# Patient Record
Sex: FEMALE | Race: BLACK OR AFRICAN AMERICAN | NOT HISPANIC OR LATINO | Employment: FULL TIME | URBAN - METROPOLITAN AREA
[De-identification: names, ages, dates, MRNs, and addresses within clinical notes are randomized per-mention and may not be internally consistent; named-entity substitution may affect disease eponyms.]

---

## 2017-04-24 ENCOUNTER — LAB VISIT (OUTPATIENT)
Dept: LAB | Facility: HOSPITAL | Age: 47
End: 2017-04-24
Attending: FAMILY MEDICINE
Payer: COMMERCIAL

## 2017-04-24 ENCOUNTER — OFFICE VISIT (OUTPATIENT)
Dept: INTERNAL MEDICINE | Facility: CLINIC | Age: 47
End: 2017-04-24
Payer: COMMERCIAL

## 2017-04-24 VITALS
TEMPERATURE: 98 F | BODY MASS INDEX: 27.59 KG/M2 | WEIGHT: 161.63 LBS | SYSTOLIC BLOOD PRESSURE: 140 MMHG | DIASTOLIC BLOOD PRESSURE: 94 MMHG | HEIGHT: 64 IN | OXYGEN SATURATION: 99 % | HEART RATE: 70 BPM

## 2017-04-24 DIAGNOSIS — E66.3 OVERWEIGHT (BMI 25.0-29.9): ICD-10-CM

## 2017-04-24 DIAGNOSIS — D50.0 IRON DEFICIENCY ANEMIA DUE TO CHRONIC BLOOD LOSS: ICD-10-CM

## 2017-04-24 DIAGNOSIS — I10 ESSENTIAL HYPERTENSION: ICD-10-CM

## 2017-04-24 DIAGNOSIS — Z00.00 ROUTINE GENERAL MEDICAL EXAMINATION AT A HEALTH CARE FACILITY: Primary | ICD-10-CM

## 2017-04-24 DIAGNOSIS — M47.812 SPONDYLOSIS OF CERVICAL REGION WITHOUT MYELOPATHY OR RADICULOPATHY: ICD-10-CM

## 2017-04-24 DIAGNOSIS — Z00.00 ROUTINE GENERAL MEDICAL EXAMINATION AT A HEALTH CARE FACILITY: ICD-10-CM

## 2017-04-24 LAB
ALBUMIN SERPL BCP-MCNC: 3.2 G/DL
ALP SERPL-CCNC: 74 U/L
ALT SERPL W/O P-5'-P-CCNC: 8 U/L
ANION GAP SERPL CALC-SCNC: 9 MMOL/L
AST SERPL-CCNC: 19 U/L
BASOPHILS # BLD AUTO: 0.05 K/UL
BASOPHILS NFR BLD: 1.1 %
BILIRUB SERPL-MCNC: 0.2 MG/DL
BUN SERPL-MCNC: 13 MG/DL
CALCIUM SERPL-MCNC: 8.8 MG/DL
CHLORIDE SERPL-SCNC: 107 MMOL/L
CHOLEST/HDLC SERPL: 3.4 {RATIO}
CO2 SERPL-SCNC: 23 MMOL/L
CREAT SERPL-MCNC: 0.8 MG/DL
DIFFERENTIAL METHOD: ABNORMAL
EOSINOPHIL # BLD AUTO: 0 K/UL
EOSINOPHIL NFR BLD: 0.4 %
ERYTHROCYTE [DISTWIDTH] IN BLOOD BY AUTOMATED COUNT: 16.9 %
EST. GFR  (AFRICAN AMERICAN): >60 ML/MIN/1.73 M^2
EST. GFR  (NON AFRICAN AMERICAN): >60 ML/MIN/1.73 M^2
FERRITIN SERPL-MCNC: 11 NG/ML
FOLATE SERPL-MCNC: 6.2 NG/ML
GLUCOSE SERPL-MCNC: 79 MG/DL
HCT VFR BLD AUTO: 31.4 %
HDL/CHOLESTEROL RATIO: 29.4 %
HDLC SERPL-MCNC: 214 MG/DL
HDLC SERPL-MCNC: 63 MG/DL
HGB BLD-MCNC: 10.1 G/DL
IRON SERPL-MCNC: 257 UG/DL
LDLC SERPL CALC-MCNC: 139.8 MG/DL
LYMPHOCYTES # BLD AUTO: 1.3 K/UL
LYMPHOCYTES NFR BLD: 28.3 %
MCH RBC QN AUTO: 24 PG
MCHC RBC AUTO-ENTMCNC: 32.2 %
MCV RBC AUTO: 75 FL
MONOCYTES # BLD AUTO: 0.4 K/UL
MONOCYTES NFR BLD: 8.5 %
NEUTROPHILS # BLD AUTO: 2.8 K/UL
NEUTROPHILS NFR BLD: 61.5 %
NONHDLC SERPL-MCNC: 151 MG/DL
PLATELET # BLD AUTO: 425 K/UL
PMV BLD AUTO: 8.9 FL
POTASSIUM SERPL-SCNC: 3.9 MMOL/L
PROT SERPL-MCNC: 7.2 G/DL
RBC # BLD AUTO: 4.21 M/UL
SATURATED IRON: 55 %
SODIUM SERPL-SCNC: 139 MMOL/L
TOTAL IRON BINDING CAPACITY: 471 UG/DL
TRANSFERRIN SERPL-MCNC: 318 MG/DL
TRIGL SERPL-MCNC: 56 MG/DL
TSH SERPL DL<=0.005 MIU/L-ACNC: 0.61 UIU/ML
VIT B12 SERPL-MCNC: 381 PG/ML
WBC # BLD AUTO: 4.6 K/UL

## 2017-04-24 PROCEDURE — 84443 ASSAY THYROID STIM HORMONE: CPT

## 2017-04-24 PROCEDURE — 85025 COMPLETE CBC W/AUTO DIFF WBC: CPT

## 2017-04-24 PROCEDURE — 82607 VITAMIN B-12: CPT

## 2017-04-24 PROCEDURE — 80053 COMPREHEN METABOLIC PANEL: CPT

## 2017-04-24 PROCEDURE — 3077F SYST BP >= 140 MM HG: CPT | Mod: S$GLB,,, | Performed by: FAMILY MEDICINE

## 2017-04-24 PROCEDURE — 82728 ASSAY OF FERRITIN: CPT

## 2017-04-24 PROCEDURE — 36415 COLL VENOUS BLD VENIPUNCTURE: CPT | Mod: PO

## 2017-04-24 PROCEDURE — 99396 PREV VISIT EST AGE 40-64: CPT | Mod: S$GLB,,, | Performed by: FAMILY MEDICINE

## 2017-04-24 PROCEDURE — 80061 LIPID PANEL: CPT

## 2017-04-24 PROCEDURE — 83540 ASSAY OF IRON: CPT

## 2017-04-24 PROCEDURE — 99999 PR PBB SHADOW E&M-EST. PATIENT-LVL III: CPT | Mod: PBBFAC,,, | Performed by: FAMILY MEDICINE

## 2017-04-24 PROCEDURE — 82746 ASSAY OF FOLIC ACID SERUM: CPT

## 2017-04-24 PROCEDURE — 3080F DIAST BP >= 90 MM HG: CPT | Mod: S$GLB,,, | Performed by: FAMILY MEDICINE

## 2017-04-24 RX ORDER — LOSARTAN POTASSIUM AND HYDROCHLOROTHIAZIDE 12.5; 5 MG/1; MG/1
1 TABLET ORAL DAILY
Qty: 90 TABLET | Refills: 3 | Status: SHIPPED | OUTPATIENT
Start: 2017-04-24 | End: 2017-12-14 | Stop reason: DRUGHIGH

## 2017-04-24 NOTE — PROGRESS NOTES
"Subjective:       Patient ID: Dana Winter is a 47 y.o. female.    Chief Complaint: Annual Exam    HPI Comments: 47-year-old Afro-American female patient with Patient Active Problem List:     Hypertension     DJD (degenerative joint disease) of cervical spine     Overweight (BMI 25.0-29.9)  Here for routine annual physicals.  Patient reported that she's not been taking her blood pressure medications but requesting refill today.   Patient denies of any significant neck pain at this time.  Reports that she continues to have heavy menstrual cycles, LMP 3/28/16.   Patient has been followed by her gynecologist regularly  Reports minimal fatigue, denies of any chest pain or shortness of breath, abdominal discomfort nausea vomiting.     Review of Systems   Constitutional: Positive for fatigue.   Eyes: Negative for visual disturbance.   Respiratory: Negative for shortness of breath.    Cardiovascular: Negative for chest pain and leg swelling.   Gastrointestinal: Negative for abdominal pain, nausea and vomiting.   Genitourinary: Positive for menstrual problem.   Musculoskeletal: Positive for myalgias. Negative for neck pain.   Skin: Negative for rash.   Neurological: Negative for weakness, light-headedness, numbness and headaches.   Psychiatric/Behavioral: Negative for sleep disturbance.         BP (!) 140/94  Pulse 70  Temp 97.8 °F (36.6 °C) (Tympanic)   Ht 5' 4" (1.626 m)  Wt 73.3 kg (161 lb 9.6 oz)  SpO2 99%  BMI 27.74 kg/m2  Objective:      Physical Exam   Constitutional: She is oriented to person, place, and time. She appears well-developed and well-nourished.   HENT:   Head: Normocephalic and atraumatic.   Mouth/Throat: Oropharynx is clear and moist.   Cardiovascular: Normal rate, regular rhythm and normal heart sounds.    No murmur heard.  Pulmonary/Chest: Effort normal and breath sounds normal. She has no wheezes.   Abdominal: Soft. Bowel sounds are normal. There is no tenderness.   Musculoskeletal: She " exhibits no edema or tenderness.   Neurological: She is alert and oriented to person, place, and time.   Skin: Skin is warm and dry. No rash noted.   Psychiatric: She has a normal mood and affect.         Assessment:       1. Routine general medical examination at a health care facility    2. Essential hypertension    3. Spondylosis of cervical region without myelopathy or radiculopathy    4. Overweight (BMI 25.0-29.9)    5. Iron deficiency anemia due to chronic blood loss        Plan:   Routine general medical examination at a health care facility  -     CBC auto differential; Future; Expected date: 4/24/17  -     Comprehensive metabolic panel; Future; Expected date: 4/24/17  -     Lipid panel; Future; Expected date: 4/24/17  -     TSH; Future; Expected date: 4/24/17  -     Urinalysis; Future; Expected date: 4/24/17  Vital signs stable today.  Clinical exam normal.   Encouraged to maintain lifestyle modifications with low-fat and low-cholesterol diet and exercise 30 minutes daily.  Up-to-date with screenings      Essential hypertension  -     Comprehensive metabolic panel; Future; Expected date: 4/24/17  -     Lipid panel; Future; Expected date: 4/24/17  -     TSH; Future; Expected date: 4/24/17  -     losartan-hydrochlorothiazide 50-12.5 mg (HYZAAR) 50-12.5 mg per tablet; Take 1 tablet by mouth once daily.  Dispense: 90 tablet; Refill: 3  Blood pressure mildly elevated for not taking her medications, will recheck blood pressure in 2 weeks as a nurse visit.   Refill given on blood pressure medications today  Encouraged to monitor blood pressure trends, restrict salt intake and eat low-fat and low-cholesterol diet.    Spondylosis of cervical region without myelopathy or radiculopathy  Stable     Overweight (BMI 25.0-29.9)-lifestyle modifications recommended as noted above    Iron deficiency anemia due to chronic blood loss  -     CBC auto differential; Future; Expected date: 4/24/17  -     Ferritin; Future; Expected  date: 4/24/17  -     Iron and TIBC; Future; Expected date: 4/24/17  -     Folate; Future; Expected date: 4/24/17  -     Vitamin B12; Future; Expected date: 4/24/17  Encouraged discuss further with her gynecology, as menorrhagia causing iron deficiency anemia.  Will recheck labs today and if appropriate will consider starting back on iron supplements  Encouraged to eat iron rich diet

## 2017-04-24 NOTE — MR AVS SNAPSHOT
[facility]  [address] [place] [address]  Phone: [phone]  Fax: [phone]                  Dana Winter   2017 10:00 AM   Office Visit    Description:  Female : 1970   Provider:  Susie Glass MD   Department:  [facility]           Reason for Visit     Annual Exam           Diagnoses this Visit        Comments    Routine general medical examination at a health care facility    -  Primary     Essential hypertension         Spondylosis of cervical region without myelopathy or radiculopathy         Overweight (BMI 25.0-29.9)         Iron deficiency anemia due to chronic blood loss                To Do List           Future Appointments        Provider Department Dept Phone    2017 12:45 PM LAB, SAME DAY [facility] [phone]    2017 1:00 PM SPECIMEN, [facility] [phone]    2017 10:00 AM INTERNAL MEDICINE NURSE, [facility] [phone]    10/24/2017 11:00 AM Susie Glass MD [facility] [phone]      Goals (5 Years of Data)     None      Follow-Up and Disposition     Return in about 6 months (around 10/24/2017), or if symptoms worsen or fail to improve.       These Medications        Disp Refills Start End    losartan-hydrochlorothiazide 50-12.5 mg (HYZAAR) 50-12.5 mg per tablet 90 tablet 3 2017    Take 1 tablet by mouth once daily. - Oral    Pharmacy: [id number] - [place]LA WHITMAN  [address] Ph #: [phone]         [facility]     [facility] Care [facility] -  Assistance  Unless otherwise directed by your provider, please contact [facility], our nurse care line that is available for  assistance.     Registered nurses in the [facility] provide: appointment scheduling, clinical advisement, health education, and other advisory services.  Call: [phone]  "(toll free)               Medications           Message regarding Medications     Verify the changes and/or additions to your medication regime listed below are the same as discussed with your clinician today.  If any of these changes or additions are incorrect, please notify your healthcare provider.             Verify that the below list of medications is an accurate representation of the medications you are currently taking.  If none reported, the list may be blank. If incorrect, please contact your healthcare provider. Carry this list with you in case of emergency.           Current Medications     ferrous sulfate 325 mg (65 mg iron) Tab tablet Take 1 tablet (325 mg total) by mouth 2 (two) times daily.    losartan-hydrochlorothiazide 50-12.5 mg (HYZAAR) 50-12.5 mg per tablet Take 1 tablet by mouth once daily.           Clinical Reference Information           Your Vitals Were     BP Pulse Temp Height Weight SpO2    140/94 70 97.8 °F (36.6 °C) (Tympanic) 5' 4" (1.626 m) 73.3 kg (161 lb 9.6 oz) 99%    BMI                27.74 kg/m2          Blood Pressure          Most Recent Value    BP  (!)  140/94      Allergies as of 4/24/2017     No Known Drug Allergies      Immunizations Administered on Date of Encounter - 4/24/2017     None      Orders Placed During Today's Visit     Future Labs/Procedures Expected by Expires    CBC auto differential  4/24/2017 6/23/2018    Comprehensive metabolic panel  4/24/2017 6/23/2018    Ferritin  4/24/2017 6/23/2018    Folate  4/24/2017 6/23/2018    Iron and TIBC  4/24/2017 6/23/2018    Lipid panel  4/24/2017 6/23/2018    TSH  4/24/2017 6/23/2018    Urinalysis  4/24/2017 6/23/2018    Vitamin B12  4/24/2017 6/23/2018      Language Assistance Services     ATTENTION: Language assistance services are available, free of charge. Please call [phone].      ATENCIÓN: Si momola agatha, tiene a mosqueda disposición servicios gratuitos de asistencia lingüística. Llame al [phone].     CHÚ " Ý: N?u b?n nói Ti?ng Vi?t, có các d?ch v? h? tr? ngôn ng? mi?n phí dành cho b?n. G?i s? [phone].         [facility] complies with applicable Federal civil rights laws and does not discriminate on the basis of race, color, national origin, age, disability, or sex.

## 2017-04-25 ENCOUNTER — TELEPHONE (OUTPATIENT)
Dept: INTERNAL MEDICINE | Facility: CLINIC | Age: 47
End: 2017-04-25

## 2017-04-25 DIAGNOSIS — D50.0 IRON DEFICIENCY ANEMIA DUE TO CHRONIC BLOOD LOSS: Primary | ICD-10-CM

## 2017-04-25 RX ORDER — FERROUS SULFATE 325(65) MG
325 TABLET ORAL 2 TIMES DAILY
Qty: 60 TABLET | Refills: 3 | Status: SHIPPED | OUTPATIENT
Start: 2017-04-25 | End: 2018-06-14 | Stop reason: SDUPTHER

## 2017-04-25 NOTE — TELEPHONE ENCOUNTER
Iron supplement sent to pharmacy, start taking iron supplements as prescribed and eat iron and protein rich diet secondary to iron deficiency anemia likely from heavy menstrual cycles and discuss with gynecology for further evaluation.  Otherwise stable labs noted

## 2017-04-25 NOTE — TELEPHONE ENCOUNTER
Spoke to pt. Informed pt of lab results, recommendations and medication. Pt verbalized understanding

## 2017-05-08 ENCOUNTER — CLINICAL SUPPORT (OUTPATIENT)
Dept: INTERNAL MEDICINE | Facility: CLINIC | Age: 47
End: 2017-05-08
Payer: COMMERCIAL

## 2017-05-08 VITALS — SYSTOLIC BLOOD PRESSURE: 128 MMHG | DIASTOLIC BLOOD PRESSURE: 72 MMHG

## 2017-05-08 NOTE — PROGRESS NOTES
Pt bp is 128/72. Advised pt to continue current mediation regimen dn to monitor nati. Pt verbalized understanding

## 2017-10-10 ENCOUNTER — PATIENT OUTREACH (OUTPATIENT)
Dept: ADMINISTRATIVE | Facility: HOSPITAL | Age: 47
End: 2017-10-10

## 2017-12-11 ENCOUNTER — OFFICE VISIT (OUTPATIENT)
Dept: OBSTETRICS AND GYNECOLOGY | Facility: CLINIC | Age: 47
End: 2017-12-11
Payer: MEDICAID

## 2017-12-11 VITALS
BODY MASS INDEX: 29.47 KG/M2 | DIASTOLIC BLOOD PRESSURE: 90 MMHG | HEIGHT: 64 IN | SYSTOLIC BLOOD PRESSURE: 158 MMHG | WEIGHT: 172.63 LBS

## 2017-12-11 DIAGNOSIS — Z12.31 SCREENING MAMMOGRAM, ENCOUNTER FOR: ICD-10-CM

## 2017-12-11 DIAGNOSIS — Z01.419 ENCOUNTER FOR GYNECOLOGICAL EXAMINATION (GENERAL) (ROUTINE) WITHOUT ABNORMAL FINDINGS: Primary | ICD-10-CM

## 2017-12-11 DIAGNOSIS — D25.1 FIBROIDS, INTRAMURAL: ICD-10-CM

## 2017-12-11 DIAGNOSIS — Z12.4 SCREENING FOR CERVICAL CANCER: ICD-10-CM

## 2017-12-11 PROCEDURE — 99999 PR PBB SHADOW E&M-EST. PATIENT-LVL III: CPT | Mod: PBBFAC,,, | Performed by: OBSTETRICS & GYNECOLOGY

## 2017-12-11 PROCEDURE — 99396 PREV VISIT EST AGE 40-64: CPT | Mod: S$PBB,,, | Performed by: OBSTETRICS & GYNECOLOGY

## 2017-12-11 PROCEDURE — 99213 OFFICE O/P EST LOW 20 MIN: CPT | Mod: PBBFAC,PN | Performed by: OBSTETRICS & GYNECOLOGY

## 2017-12-11 NOTE — PROGRESS NOTES
Subjective:       Patient ID: Dana Winter is a 47 y.o. female.    Chief Complaint:  Well Woman      History of Present Illness  HPI  Annual Exam-Premenopausal  Patient presents for annual exam. The patient has no complaints today. The patient is sexually active--denies pelvic pain; feels libido is ok; occas vaginal dryness; . GYN screening history: last pap: approximate date 201+6 and was normal and last mammogram: approximate date  and was normal. The patient wears seatbelts: yes. The patient participates in regular exercise: no. Has the patient ever been transfused or tattooed?: no. The patient reports that there is not domestic violence in her life.      Menses monthly, flow 3-4 days; super tampon; occas soak through; flow; heavy for first 2 days (has to change q 1-2 hrs); no soak through pad; mod dysmenorrhea; but no medication needed    Denies hot flushes, night sweat      Denies urinary leakage        GYN & OB HistoryPatient's last menstrual period was 2017.   Date of Last Pap: 12/15/2016    OB History    Para Term  AB Living   4 4 4     4   SAB TAB Ectopic Multiple Live Births                  # Outcome Date GA Lbr Agusto/2nd Weight Sex Delivery Anes PTL Lv   4 Term      Vag-Spont EPI     3 Term      Vag-Spont EPI     2 Term      Vag-Spont EPI     1 Term      Vag-Spont EPI            Review of Systems  Review of Systems   Constitutional: Negative for activity change, appetite change, chills, diaphoresis, fatigue, fever and unexpected weight change.   HENT: Negative for mouth sores and tinnitus.    Eyes: Negative for discharge and visual disturbance.   Respiratory: Negative for cough, shortness of breath and wheezing.    Cardiovascular: Negative for chest pain, palpitations and leg swelling.   Gastrointestinal: Negative for abdominal pain, bloating, blood in stool, constipation, diarrhea, nausea and vomiting.   Endocrine: Negative for diabetes, hair loss, hot flashes,  hyperthyroidism and hypothyroidism.   Genitourinary: Negative for decreased libido, dyspareunia, dysuria, flank pain, frequency, genital sores, hematuria, menorrhagia, menstrual problem, pelvic pain, urgency, vaginal bleeding, vaginal discharge, vaginal pain, dysmenorrhea, urinary incontinence, postcoital bleeding, postmenopausal bleeding and vaginal odor.        Vaginal dryness   Musculoskeletal: Negative for back pain and myalgias.   Skin:  Negative for rash, no acne and hair changes.   Neurological: Negative for seizures, syncope, numbness and headaches.   Hematological: Negative for adenopathy. Does not bruise/bleed easily.   Psychiatric/Behavioral: Negative for depression and sleep disturbance. The patient is not nervous/anxious.    Breast: Negative for breast mass, breast pain, nipple discharge and skin changes          Objective:    Physical Exam:   Constitutional: She appears well-developed.     Eyes: Conjunctivae and EOM are normal. Pupils are equal, round, and reactive to light.    Neck: Normal range of motion. Neck supple.     Pulmonary/Chest: Effort normal. Right breast exhibits no mass, no nipple discharge, no skin change and no tenderness. Left breast exhibits no mass, no nipple discharge, no skin change and no tenderness. Breasts are symmetrical.        Abdominal: Soft.     Genitourinary: Rectum normal and vagina normal. Pelvic exam was performed with patient supine. Uterus is enlarged and hosting fibroids. Cervix is normal. Right adnexum displays no mass and no tenderness. Left adnexum displays no mass and no tenderness. No erythema, bleeding, rectocele, cystocele or unspecified prolapse of vaginal walls in the vagina. No vaginal discharge found. Labial bartholins normal.       Uterus Size: 20 cm   Musculoskeletal: Normal range of motion.       Neurological: She is alert.    Skin: Skin is warm.    Psychiatric: She has a normal mood and affect.          Assessment:     Encounter Diagnoses   Name  Primary?    Encounter for gynecological examination (general) (routine) without abnormal findings Yes    Screening for cervical cancer     Screening mammogram, encounter for     Fibroids, intramural                Plan:      Continue annual well woman exam.  Pap 2016; due in 2019   Reviewed ut fibroids and menorrhagia, prefers continued observation for now; continue menstrual calendary;  Continue daily iron  Add lubricant for intercourse  mammo ordered  Reminded to take bp meds daily; add aspirin  Continue diet, exercise, weight loss

## 2017-12-14 ENCOUNTER — OFFICE VISIT (OUTPATIENT)
Dept: INTERNAL MEDICINE | Facility: CLINIC | Age: 47
End: 2017-12-14
Payer: MEDICAID

## 2017-12-14 VITALS
BODY MASS INDEX: 28.95 KG/M2 | HEIGHT: 64 IN | WEIGHT: 169.56 LBS | SYSTOLIC BLOOD PRESSURE: 136 MMHG | DIASTOLIC BLOOD PRESSURE: 94 MMHG | OXYGEN SATURATION: 98 % | HEART RATE: 100 BPM | TEMPERATURE: 100 F

## 2017-12-14 DIAGNOSIS — E66.3 OVERWEIGHT (BMI 25.0-29.9): ICD-10-CM

## 2017-12-14 DIAGNOSIS — I10 ESSENTIAL HYPERTENSION: Primary | ICD-10-CM

## 2017-12-14 DIAGNOSIS — M47.22 OSTEOARTHRITIS OF SPINE WITH RADICULOPATHY, CERVICAL REGION: ICD-10-CM

## 2017-12-14 PROCEDURE — 99213 OFFICE O/P EST LOW 20 MIN: CPT | Mod: S$PBB,,, | Performed by: PHYSICIAN ASSISTANT

## 2017-12-14 PROCEDURE — 99999 PR PBB SHADOW E&M-EST. PATIENT-LVL III: CPT | Mod: PBBFAC,,, | Performed by: PHYSICIAN ASSISTANT

## 2017-12-14 PROCEDURE — 99213 OFFICE O/P EST LOW 20 MIN: CPT | Mod: PBBFAC,PO | Performed by: PHYSICIAN ASSISTANT

## 2017-12-14 RX ORDER — LOSARTAN POTASSIUM AND HYDROCHLOROTHIAZIDE 25; 100 MG/1; MG/1
1 TABLET ORAL DAILY
Qty: 30 TABLET | Refills: 1 | Status: SHIPPED | OUTPATIENT
Start: 2017-12-14 | End: 2018-06-12

## 2017-12-14 NOTE — PATIENT INSTRUCTIONS
Taking Your Blood Pressure  Blood pressure is the force of blood against the artery wall as it moves from the heart through the blood vessels. You can take your own blood pressure reading using a digital monitor. Take your readings the same each time, using the same arm. Take readings as often as your healthcare provider instructs.  About blood pressure monitors  Blood pressure monitors are designed for certain ages and cases. You can find monitors for older adults, for pregnant women, and for children. Make sure the one you choose is the right one for your age and situation.  The American Heart Association recommends an automatic cuff monitor that fits on your upper arm (bicep). The cuff should fit your arm size. A cuff thats too large or too small will not give an accurate reading. Measure around your upper arm to find your size.  Monitors that attach to your finger or wrist are not as accurate as monitors for your upper arm.  Ask your healthcare provider for help in choosing a monitor. Bring your monitor to your next provider visit if you need help in using it the correct way.  The steps below are general instructions for using an automatic digital monitor.  Step 1. Relax    · Take your blood pressure at the same time every day, such as in the morning or evening, or at the time your healthcare provider recommends.  · Wait at least a half-hour after smoking, eating, or exercising. Don't drink coffee, tea, soda, or other caffeinated beverages before checking your blood pressure.  · Sit comfortably at a table with both feet on the floor. Do not cross your legs or feet. Place the monitor near you.  · Rest for a few minutes before you begin.  Step 2. Wrap the cuff    · Place your arm on the table, palm up. Your arm should be at the level of your heart. Wrap the cuff around your upper arm, just above your elbow. Its best done on bare skin, not over clothing. Most cuffs will indicate where the brachial artery (the  blood vessel in the middle of the arm at the inner side of the elbow) should line up with the cuff. Look in your monitor's instruction booklet for an illustration. You can also bring your cuff to your healthcare provider and have them show you how to correctly place the cuff.  Step 3. Inflate the cuff    · Push the button that starts the pump.  · The cuff will tighten, then loosen.  · The numbers will change. When they stop changing, your blood pressure reading will appear.  · Take 2 or 3 readings one minute apart.  Step 4. Write down the results of each reading    · Write down your blood pressure numbers for each reading. Note the date and time. Keep your results in one place, such as a notebook. Even if your monitor has a built-in memory, keep a hard copy of the readings.  · Remove the cuff from your arm. Turn off the machine.  · Bring your blood pressure records with your healthcare providers at each visit.  · If you start a new blood pressure medicine, note the day you started the new medicine. Also note the day if you change the dose of your medicine. This information goes on your blood pressure recording sheet. This will help your healthcare provider monitor how well the medicine changes are working.  · Ask your healthcare provider what numbers should prompt you to call him or her. Also ask what numbers should prompt you to get help right away.  Date Last Reviewed: 11/1/2016 © 2000-2017 [employer]. [address] [place] [address]. All rights reserved. This information is not intended as a substitute for professional medical care. Always follow your healthcare professional's instructions.        Degenerative Disk Disease    Spinal disks are gel-filled cushions between the bones, or vertebrae, of the spine. The disks act like shock absorbers. Over time, the disks may break down. This is called degenerative disk disease.  This condition can affect the neck or back. It is one of the most  common causes of low back pain. It is the leading cause of disability in people under age 45 in the [place]. The pain often remains localized to the lower back or neck. Muscle spasm is often present and adds to the pain.  Disk degeneration is a natural part of aging. But it is not painful for most people. It may also occur as a result of repeated minor injuries due to daily activities, sports, or accidents. It may lead to osteoarthritis of the spine. Back pain related to disk disease may come and go. Or it may become chronic and last for months or years. The disk may bulge or rupture. This is called a slipped disk or herniated disk. That can put pressure on a nearby spinal nerve and cause neck or back pain that spreads down one arm or leg.  X-rays or an MRI may help to diagnose this condition. For acute pain, treatment includes anti-inflammatory medicines, muscle relaxants, rest, ice, or heat. Strong prescription pain medicines, called opioids, may be needed for short-term treatment if pain suddenly gets worse. Opioid medicines can be addictive. So they are not advised for long-term pain management. Other types of medicines are preferred. Surgery is generally not used to treat this condition unless there is a complication.    Home care  · For neck pain: Use a comfortable pillow that supports the head and keeps the spine in a neutral position. Your head should not be tilted forward or backward.  · For back pain: Avoid sitting for long periods of time. This puts more stress on the lower back than standing or walking. Starting a regular exercise program to strengthen the supporting muscles of the spine will make it easier to live with degenerative disk disease.  · Apply an ice pack over the injured area for no more than 15 to 20 minutes. Do this every 3 to 6 hours for the first 24 to 48 hours. To make an ice pack, put ice cubes in a plastic bag that seals at the top. Wrap the bag in a clean, thin towel or cloth.  Never put ice or an ice pack directly on the skin. Keep using ice packs to ease pain and swelling as needed. After 48 hours, apply heat (warm shower or warm bath) for 20 minutes several times a day, or switch between ice and heat.   · You may use over-the-counter pain medicine to control pain, unless another pain medicine was prescribed. If you have chronic liver or kidney disease or ever had a stomach ulcer or GI bleeding, talk with your provider before using these medicines.  Follow-up care  Follow up with your healthcare provider, or as directed.  If X-rays, a CT scan or an MRI were done, you will be notified of any new findings that may affect your care.  When to seek medical advice  Contact your healthcare provider right away if any of these occur:  · Increasing back pain  · Your foot drags when you walk, a condition called foot drop  · You have new weakness, numbness, or pain in one or both arms or legs  · Loss of bowel or bladder control  · Numbness or tingling in the buttock or groin area  Date Last Reviewed: 11/23/2015 © 2000-2017 [employer]. [address] [place] [address]. All rights reserved. This information is not intended as a substitute for professional medical care. Always follow your healthcare professional's instructions.        Arthritis: Exercise     Look for exercise classes for arthritis in your community.     Exercise is important to your overall health. It is especially important in people with arthritis. Regular exercise can:  · Keep your heart and blood vessels healthy  · Help with weight management, or weight loss  · Improve your mood  · Help prevent and manage health problems such as:  ¨ Diabetes  ¨ High blood pressure  ¨ High cholesterol  ¨ Depression  In people with arthritis, it offers all of those benefits and it can:  · Lessen pain and stiffness  · Strengthen muscles that support your joints  · Help you to be able to do the things you enjoy  Exercise and  arthritis  Exercise is an important part of any arthritis treatment plan. A complete program consists of the following three types of exercises:  · Aerobic exercises for cardiovascular health and overall fitness.   · Strengthening exercises to build up muscles to help prevent injury and keep joints stable.  · Range-of-motion exercises to keep muscles and joints flexible.  Getting started  Talk with your healthcare provider about what is safe for you. Make sure you:  · Learn how to do exercises properly and safely. Consider talking with a physical therapist or  used to working with people with arthritis.  · Start gradually and build. If you haven't been exercising, start slowly. Don't exercise too hard or too long.  · Create a routine. Set aside specific times for exercise every day.  · Warm up carefully. Take 5 to 10 minutes at the beginning and end of exercising to warm up and cool down. Just do the same exercises at a slower pace for 5 to 10 minutes.  · Work at a comfortable, smooth pace. Move your joints gently to prevent injury.  · Pay attention to your body. Don't exercise a painful or swollen joint; switch to another activity. Follow the 2-hour pain rule: You did too much if your joint or muscle pain lasts 2 hours or more after exercising, or is worse the next day. This doesn't mean you should stop exercising. Just do less.  Aerobic exercise  Aerobic exercise improves overall health and helps control weight. Choose those that don't add extra stress to your joints. For example, walking, swimming, or bicycling.  Most people should exercise for at least 30 minutes. most days of the week. You don't have to exercise all at once. Try exercising for 10 minutes, 3 times a day, for example.  Strengthening exercises  Strengthening your muscles help to protect your joints and prevent injuries. Try to do strengthening exercises 2 to 3 times a week:  · These exercises can be done with exercise or resistance bands  (inexpensive exercise aids that add resistance), or with light weights. Some people use soup cans as weights.  · Isometric exercises are done by tightening the muscles without moving the joint. This may be a good way to strengthen the muscles around a stiff joint.  A physical therapist or  can teach you how to do these exercises.  Range-of-motion (ROM) exercises  Range-of-motion (ROM) exercises allow you to move each of your joints in every way they are intended to move. You should do ROM exercises for each joint 2 to 3 times a day. This will help you maintain full use of all of your joints.  Sample ROM exercises  The following are just a sample of ROM exercises--one for your neck, shoulders, elbows, hips, knees, and ankles. To completely move each joint through its full range of motion, you will have to do a few exercises for each joint. A physical therapist or  can teach you how to do full ROM exercises for each joint.   Repeat each for these exercises 5 to 10 times. Make sure you move slowly:  1. Neck turns. Sit in a straight-backed chair. Look straight ahead. Slowly turn your head to the right, then return it to center. Repeat. Do the same thing, turning your head to the left. Repeat.  2. Shoulder raise. Lie on your back or sit in a chair. Raise one arm over your head, keeping your elbow straight. Keep the arm close to your ear. Return it slowly to your side. Repeat with your other arm.  3. Elbow stretch. Sit in a chair. If you are able, put both arms out to your sides to form a T. Slowly touch your shoulders with the tips of your fingers. Then return to the T-position. Repeat.  4. Hip stretch. Lie on your back with your legs straight and about 6 inches apart. With your foot flexed, slide your leg out to the side, then slide it back to the starting position. Repeat with your other leg.  5. Knee bend. Sit in a chair with your legs bent at the knees in front of you. Straighten one leg as much as you  can, then bring it back to the floor. Repeat this 5 to 10 times. Then do the same thing with the other leg.   6. Ankle stretch. Sit with your feet flat on the floor. Lift your toes off of the floor while your heels stay down. Repeat. Then lift your heels off the floor while your toes stay down. Repeat.  Other exercise  Many other exercise and activities benefit people with arthritis. It is most important to find exercise and activities that you enjoy. You might try:  · Yoga, including chair yoga, helps to keep your joints strong and flexible.   · Carlos Manuel Chi, an ancient type of exercise with slow, gentle movements  · Water exercise, including water walking  For more information on exercise for arthritis go to the Arthritis Foundation website: www.arthritis.org.  Date Last Reviewed: 2/14/2016 © 2000-2017 [employer]. [address] [place] [address]. All rights reserved. This information is not intended as a substitute for professional medical care. Always follow your healthcare professional's instructions.        Your High Blood Pressure Risk Factors  Risk factors are things that make you more likely to have a disease or condition. Do you know your risk factors for high blood pressure? You cant do anything about some risk factors. But other risk factors are things that can be changed. Know what high blood pressure risk factors you have. Then find out what changes you can make to help control your risk for high blood pressure. Start with the change that you think will be easiest for you.  Risk factors you cant control  Though you cant change any of the things listed below, check off the ones that apply to you. The more boxes you check, the greater your risk for high blood pressure.  Family history  ? One or both of your parents or grandparents has had high blood pressure or heart disease.  Gender and age  ? Youre a man over age 55 or a postmenopausal woman.  Risk factors you can control  There  are plenty of risk factors for high blood pressure that you can control. Learn what these risk factors are and then find out how to reduce your risk. Check the ones that apply to you.  ? What you eat  Do you eat a lot of salty, fatty, fried, or greasy foods? Do you go to restaurants or eat out frequently?  ? Alcohol consumption  Do you drink more than 1 drink a day if you are a female or more than 2 drinks a day if you male?   ? If you smoke or someone close to you smokes  Do you smoke cigarettes or cigars, chew tobacco, or dip snuff? Are you exposed to second-hand smoke on a regular basis?  ? How active you are   Are you inactive most of the time at work and at home? Do you go weeks without exercising?  ? Your weight   Has your doctor said that you are 15 or more pounds overweight?  ? Your stress level    Do you often feel anxious, nervous, and stressed? Do you feel that you don't have a supportive environment?  Date Last Reviewed: 4/27/2016 © 2000-2017 [employer]. [address] [place] [address]. All rights reserved. This information is not intended as a substitute for professional medical care. Always follow your healthcare professional's instructions.        Established High Blood Pressure    High blood pressure (hypertension) is a chronic disease. Often, healthcare providers dont know what causes it. But it can be caused by certain health conditions and medicines.  If you have high blood pressure, you may not have any symptoms. If you do have symptoms, they may include headache, dizziness, changes in your vision, chest pain, and shortness of breath. But even without symptoms, high blood pressure thats not treated raises your risk for heart attack and stroke. High blood pressure is a serious health risk and shouldnt be ignored.  A blood pressure reading is made up of two numbers: a higher number over a lower number. The top number is the systolic pressure. The bottom number is the  diastolic pressure. A normal blood pressure is a systolic pressure of  less than 120 over a diastolic pressure of less than 80. You will see your blood pressure readings written together. For example, a person with a systolic pressure of 188 and a diastolic pressure of 78 will have 118/78 written in the medical record.  High blood pressure is when either the top number is 140 or higher, or the bottom number is 90 or higher. This must be the result when taking your blood pressure a number of times. The blood pressures between normal and high are called prehypertension.  Home care  If you have high blood pressure, you should do what is listed below to lower your blood pressure. If you are taking medicines for high blood pressure, these methods may reduce or end your need for medicines in the future.  · Begin a weight-loss program if you are overweight.  · Cut back on how much salt you get in your diet. Heres how to do this:  ¨ Dont eat foods that have a lot of salt. These include olives, pickles, smoked meats, and salted potato chips.  ¨ Dont add salt to your food at the table.  ¨ Use only small amounts of salt when cooking.  · Start an exercise program. Talk with your healthcare provider about the type of exercise program that would be best for you. It doesn't have to be hard. Even brisk walking for 20 minutes 3 times a week is a good form of exercise.  · Dont take medicines that stimulate the heart. This includes many over-the-counter cold and sinus decongestant pills and sprays, as well as diet pills. Check the warnings about hypertension on the label. Before buying any over-the-counter medicines or supplements, always ask the pharmacist about the product's potential interaction with your high blood pressure and your high blood pressure medicines.  · Stimulants such as amphetamine or cocaine could be deadly for someone with high blood pressure. Never take these.  · Limit how much caffeine you get in your diet.  Switch to caffeine-free products.  · Stop smoking. If you are a long-time smoker, this can be hard. Talk to your healthcare provider about medicines and nicotine replacement options to help you. Also, enroll in a stop-smoking program to make it more likely that you will quit for good.  · Learn how to handle stress. This is an important part of any program to lower blood pressure. Learn about relaxation methods like meditation, yoga, or biofeedback.  · If your provider prescribed medicines, take them exactly as directed. Missing doses may cause your blood pressure get out of control.  · If you miss a dose or doses, check with your healthcare provider or pharmacist about what to do.  · Consider buying an automatic blood pressure machine. Ask your provider for a recommendation. You can get one of these at most pharmacies.     The American Heart Association recommends the following guidelines for home blood pressure monitoring:  · Don't smoke or drink coffee for 30 minutes before taking your blood pressure.  · Go to the bathroom before the test.  · Relax for 5 minutes before taking the measurement.  · Sit with your back supported (don't sit on a couch or soft chair); keep your feet on the floor uncrossed. Place your arm on a solid flat surface (like a table) with the upper part of the arm at heart level. Place the middle of the cuff directly above the eye of the elbow. Check the monitor's instruction manual for an illustration.  · Take multiple readings. When you measure, take 2 to 3 readings one minute apart and record all of the results.  · Take your blood pressure at the same time every day, or as your healthcare provider recommends.  · Record the date, time, and blood pressure reading.  · Take the record with you to your next medical appointment. If your blood pressure monitor has a built-in memory, simply take the monitor with you to your next appointment.  · Call your provider if you have several high readings.  Don't be frightened by a single high blood pressure reading, but if you get several high readings, check in with your healthcare provider.  · Note: When blood pressure reaches a systolic (top number) of 180 or higher OR diastolic (bottom number) of 110 or higher, seek emergency medical treatment.  Follow-up care  You will need to see your healthcare provider regularly. This is to check your blood pressure and to make changes to your medicines. Make a follow-up appointment as directed. Bring the record of your home blood pressure readings to the appointment.  When to seek medical advice  Call your healthcare provider right away if any of these occur:  · Blood pressure reaches a systolic (upper number) of 180 or higher OR a diastolic (bottom number) of 110 or higher  · Chest pain or shortness of breath  · Severe headache  · Throbbing or rushing sound in the ears  · Nosebleed  · Sudden severe pain in your belly (abdomen)  · Extreme drowsiness, confusion, or fainting  · Dizziness or spinning sensation (vertigo)  · Weakness of an arm or leg or one side of the face  · You have problems speaking or seeing   Date Last Reviewed: 12/1/2016  © 2031-4445 [employer]. [address] [place] [address], [place] [address]. All rights reserved. This information is not intended as a substitute for professional medical care. Always follow your healthcare professional's instructions.        Uncontrolled High Blood Pressure (Established)    Your blood pressure was unusually high today. This can occur if youve missed doses of your blood pressure medicine. Or it can happen if you are taking other medicines. These include some asthma inhalers, decongestants, diet pills, and street drugs like cocaine and amphetamine.  Other causes include:  · Weight gain  · More salt in your diet  · Smoking  · Caffeine  Your blood pressure can also rise if you are emotionally upset or in intense pain. It may go back to normal after a period of rest.  A  blood pressure reading is made up of 2 numbers. There is a top number over a bottom number. The top number is the systolic pressure. The bottom number is the diastolic pressure. A normal blood pressure is a systolic pressure of less than 120 over a diastolic pressure of less than 80. High blood pressure (hypertension) is when the top number is 140 or higher. Or it is when the bottom number is 90 or higher. You will see your blood pressure readings written together. For example, a person with a systolic pressure of 118 and a diastolic pressure of 78 will have 118/78 written in the medical record. To be high blood pressure, the numbers must be higher when tested over a period of time. The blood pressures between normal and hypertension are called prehypertension. Prehypertension is a warning sign. The information gives you a chance to make lifestyle changes (weight loss, more exercise) that can keep your blood pressure from going higher.  Home care  Its important to take steps to lower your blood pressure. If you are taking blood pressure medicine, the guidelines below may help you need less or no medicines in the future.  · Begin a weight-loss program if you are overweight.  · Cut back on the amount of salt in your diet:  ¨ Avoid high-salt foods like olives, pickles, smoked meats, and salted potato chips.  ¨ Dont add salt to your food at the table.  ¨ Use only small amounts of salt when cooking.  · Begin an exercise program. Talk with your health care provider about what exercise program is best for you. It doesnt have to be difficult. Even brisk walking for 20 minutes 3 times a week is a good form of exercise.  · Avoid medicines that stimulates the heart. This includes many over-the-counter cold and sinus decongestant pills and sprays, as well as diet pills. Check the warnings about hypertension on the label. Before purchasing any over-the-counter medicines or supplements, always ask the pharmacist about the  product's potential interaction with your high blood pressure and your medicines.  · Stimulants such as amphetamine or cocaine could be lethal for someone with hypertension. Never take these.  · Limit how much caffeine you drink. Or switch to noncaffeinated beverages.  · Stop smoking. If you are a long-time smoker, this can be hard. Enroll in a stop-smoking program to make it more likely that you will succeed. Talk with your provider about ways to quit.  · Learn how to handle stress better. This is an important part of any program to lower blood pressure. Learn ways to relax. These include meditation, yoga, and biofeedback.  · If medicines were prescribed, take them exactly as directed. Missing doses may cause your blood pressure to get out of control.  · If you miss a dose or doses of your medicines, check with your healthcare provider or pharmacist about what to do.  · Consider buying an automatic blood pressure machine. Your provider may recommend a certain type. You can get one of these at most pharmacies. Measure your blood pressure twice a day, in the morning, and in the late afternoon. Keep a written record of your home blood pressure readings and take the record to your medical appointments.  Here are some additional guidelines on home blood pressure monitoring from the American [employer].  · Don't smoke or drink coffee for 30 minutes  · Go to the bathroom before the test.  · Relax for 5 minutes before taking the measurement.  · Sit correctly. Be sure your back is supported. Don't sit on a couch or soft chair. Uncross your feet and place them flat on the floor. Place your arm on a solid, flat surface like a table with the upper arm at heart level. Make certain the middle of the cuff is directly above the eye of the elbow. Check the monitor's instruction manual for an illustration.  · Take multiple readings. When you measure, take 2 or 3 readings one minute apart and record all of the results.  · Take  your blood pressure at the same time every day, or as your healthcare provider recommends.  · Record the date, time, and blood pressure reading.  · Take the record with you to your next appointment. If your blood pressure monitor has a built-in memory, simply take the monitor with you to your next appointment.  · Call your provider if you have several high readings. Don't be frightened by a single high reading, but if you get several high readings, check in with your healthcare provider.  · Note: When blood pressure reaches a systolic (top number) of 180 or higher or a diastolic (bottom number) of 110 or higher, emergency medical treatment is required. Call your healthcare provider immediately.  Follow-up care  Regular visits to your own healthcare provider for blood pressure and medicine checks are an important part of your care. Make a follow-up appointment as directed. Bring the record of your home blood pressure readings to the appointment.  When to seek medical advice  Call your healthcare provider right away if any of these occur:  · Blood pressure reaches a systolic (top number) of 180 or higher or diastolic (bottom number) of 110 or higher, emergency medical treatment is required.  · Chest, arm, shoulder, neck, or upper back pain  · Shortness of breath  · Severe headache  · Throbbing or rushing sound in the ears  · Nosebleed  · Extreme drowsiness, confusion, or fainting  · Dizziness or dizziness with spinning sensation (vertigo)  · Weakness in an arm or leg or on one side of the face  · Trouble speaking or seeing   Date Last Reviewed: 1/1/2017  © 9950-9475 [employer]. [address] [place] [address], [place] [address]. All rights reserved. This information is not intended as a substitute for professional medical care. Always follow your healthcare professional's instructions.

## 2017-12-14 NOTE — PROGRESS NOTES
"  Subjective:      Patient ID: Dana Winter is a 47 y.o. female.    Chief Complaint: Follow-up    Here today for a routine exam and follow up for her medical conditions. Denies any acute problems to discuss today.        Patient Active Problem List   Diagnosis    Hypertension - elevated today. Pt does report getting headaches more frequently. Admits that she just got back on her blood pressure medication 1-2 weeks ago. Stopped the medication because she didn't think she needed it anymore.    DJD (degenerative joint disease) of cervical spine-stable. Gets occasional numbness and tingling down her arms.     Overweight (BMI 25.0-29.9) - Rarely exercises. Diet could be better.      Hypertension   This is a new problem. Associated symptoms include blurred vision, chest pain, headaches and palpitations. Pertinent negatives include no anxiety, malaise/fatigue, neck pain, orthopnea, peripheral edema, PND, shortness of breath or sweats. There are no associated agents to hypertension. Risk factors for coronary artery disease include obesity, family history and sedentary lifestyle. Past treatments include angiotensin blockers and diuretics. The current treatment provides moderate improvement. Compliance problems: doesnt take medication as prescribed. Stops the medication.     Refuses flu shot.     Last 3 sets of Vitals    Vitals - 1 value per visit 5/8/2017 12/11/2017 12/14/2017   SYSTOLIC 128 158 136   DIASTOLIC 72 90 94   PULSE - - 100   TEMPERATURE - - 99.6   SPO2 - - 98   Weight (lb) - 172.62 169.53   Weight (kg) - 78.3 76.9   HEIGHT - 5' 4" 5' 4"   BODY MASS INDEX - 29.63 29.1   VISIT REPORT - - -   Pain Score  - - -   Some recent data might be hidden     Review of Systems   Constitutional: Negative for activity change, appetite change, chills, diaphoresis, fatigue, fever, malaise/fatigue and unexpected weight change.   HENT: Negative.  Negative for congestion, hearing loss, postnasal drip, rhinorrhea, sore " "throat, trouble swallowing and voice change.    Eyes: Positive for blurred vision. Negative for visual disturbance.   Respiratory: Negative.  Negative for cough, choking, chest tightness and shortness of breath.    Cardiovascular: Positive for chest pain and palpitations. Negative for orthopnea, leg swelling and PND.   Gastrointestinal: Negative for abdominal distention, abdominal pain, blood in stool, constipation, diarrhea, nausea and vomiting.   Endocrine: Negative for cold intolerance, heat intolerance, polydipsia and polyuria.   Genitourinary: Negative.  Negative for difficulty urinating and frequency.   Musculoskeletal: Negative for arthralgias, back pain, gait problem, joint swelling, myalgias and neck pain.   Skin: Negative for color change, pallor, rash and wound.   Neurological: Positive for headaches. Negative for dizziness, tremors, weakness, light-headedness and numbness.   Hematological: Negative for adenopathy.   Psychiatric/Behavioral: Negative for behavioral problems, confusion, self-injury, sleep disturbance and suicidal ideas. The patient is not nervous/anxious.      Objective:   BP (!) 136/94   Pulse 100   Temp 99.6 °F (37.6 °C) (Tympanic)   Ht 5' 4" (1.626 m)   Wt 76.9 kg (169 lb 8.5 oz)   LMP 11/24/2017   SpO2 98%   BMI 29.10 kg/m²     Physical Exam   Constitutional: She is oriented to person, place, and time. She appears well-developed and well-nourished.   HENT:   Head: Normocephalic and atraumatic.   Right Ear: Tympanic membrane, external ear and ear canal normal.   Left Ear: Tympanic membrane, external ear and ear canal normal.   Nose: Nose normal. No mucosal edema or rhinorrhea.   Mouth/Throat: Uvula is midline, oropharynx is clear and moist and mucous membranes are normal. No tonsillar exudate.   Eyes: Conjunctivae and EOM are normal. Pupils are equal, round, and reactive to light.   Neck: Trachea normal, normal range of motion and full passive range of motion without pain. Neck " supple.   Cardiovascular: Normal rate, regular rhythm and normal heart sounds.  Exam reveals no gallop and no friction rub.    No murmur heard.  Pulmonary/Chest: Effort normal and breath sounds normal. No respiratory distress. She has no wheezes. She has no rales. She exhibits no tenderness.   Abdominal: Soft. She exhibits no distension. There is no tenderness.   Musculoskeletal: Normal range of motion.   Lymphadenopathy:     She has no cervical adenopathy.   Neurological: She is alert and oriented to person, place, and time.   Skin: Skin is warm and dry.   Psychiatric: She has a normal mood and affect. Her behavior is normal. Judgment and thought content normal.   Vitals reviewed.      Assessment:     1. Essential hypertension    2. Osteoarthritis of spine with radiculopathy, cervical region    3. Overweight (BMI 25.0-29.9)      Plan:   Essential hypertension  -     losartan-hydrochlorothiazide 100-25 mg (HYZAAR) 100-25 mg per tablet; Take 1 tablet by mouth once daily.  Dispense: 30 tablet; Refill: 1  -advised to monitor at home. A print out on how to check blood pressure at home was printed for her. Advised her to log daily and bring log to follow up appointment. Discussed in detail the risks of uncontrolled BP. Discussed improtance of taking medications as prescribed.   -will increase her blood pressure medication and recheck in 1-2 weeks. Goal is under 140/90. Significant for MI (father) around 58 years old and passed away.      Osteoarthritis of spine with radiculopathy, cervical region  -NSAIDS PRN pain/radiculopathy. Advised against prolonged use (>7 days in a row).     Overweight (BMI 25.0-29.9)  -Advise to maintain lifestyle changes with low carbohydrate, low sugar diet and exercise 30 minutes daily      Return in about 10 days (around 12/24/2017), or if symptoms worsen or fail to improve.

## 2017-12-18 ENCOUNTER — HOSPITAL ENCOUNTER (OUTPATIENT)
Dept: RADIOLOGY | Facility: HOSPITAL | Age: 47
Discharge: HOME | End: 2017-12-18
Attending: OBSTETRICS & GYNECOLOGY
Payer: MEDICAID

## 2017-12-18 VITALS — WEIGHT: 169 LBS | HEIGHT: 64 IN | BODY MASS INDEX: 28.85 KG/M2

## 2017-12-18 DIAGNOSIS — Z12.31 SCREENING MAMMOGRAM, ENCOUNTER FOR: ICD-10-CM

## 2017-12-18 PROCEDURE — 77063 BREAST TOMOSYNTHESIS BI: CPT | Mod: 26,,, | Performed by: RADIOLOGY

## 2017-12-18 PROCEDURE — 77067 SCR MAMMO BI INCL CAD: CPT | Mod: 26,,, | Performed by: RADIOLOGY

## 2017-12-18 PROCEDURE — 77067 SCR MAMMO BI INCL CAD: CPT | Mod: TC,PO

## 2017-12-20 ENCOUNTER — PATIENT MESSAGE (OUTPATIENT)
Dept: OBSTETRICS AND GYNECOLOGY | Facility: CLINIC | Age: 47
End: 2017-12-20

## 2018-06-12 ENCOUNTER — CLINICAL SUPPORT (OUTPATIENT)
Dept: CARDIOLOGY | Facility: CLINIC | Age: 48
End: 2018-06-12
Payer: MEDICAID

## 2018-06-12 ENCOUNTER — OFFICE VISIT (OUTPATIENT)
Dept: INTERNAL MEDICINE | Facility: CLINIC | Age: 48
End: 2018-06-12
Payer: MEDICAID

## 2018-06-12 VITALS
WEIGHT: 171.94 LBS | DIASTOLIC BLOOD PRESSURE: 86 MMHG | HEART RATE: 71 BPM | SYSTOLIC BLOOD PRESSURE: 132 MMHG | HEIGHT: 64 IN | BODY MASS INDEX: 29.35 KG/M2 | TEMPERATURE: 98 F | OXYGEN SATURATION: 98 %

## 2018-06-12 DIAGNOSIS — M79.672 LEFT FOOT PAIN: ICD-10-CM

## 2018-06-12 DIAGNOSIS — Z00.00 ROUTINE GENERAL MEDICAL EXAMINATION AT A HEALTH CARE FACILITY: Primary | ICD-10-CM

## 2018-06-12 DIAGNOSIS — Z00.00 ROUTINE GENERAL MEDICAL EXAMINATION AT A HEALTH CARE FACILITY: ICD-10-CM

## 2018-06-12 DIAGNOSIS — D50.0 IRON DEFICIENCY ANEMIA DUE TO CHRONIC BLOOD LOSS: ICD-10-CM

## 2018-06-12 DIAGNOSIS — E66.3 OVERWEIGHT (BMI 25.0-29.9): ICD-10-CM

## 2018-06-12 PROCEDURE — 99999 PR PBB SHADOW E&M-EST. PATIENT-LVL III: CPT | Mod: PBBFAC,,, | Performed by: FAMILY MEDICINE

## 2018-06-12 PROCEDURE — 99213 OFFICE O/P EST LOW 20 MIN: CPT | Mod: PBBFAC,PO,25 | Performed by: FAMILY MEDICINE

## 2018-06-12 PROCEDURE — 99396 PREV VISIT EST AGE 40-64: CPT | Mod: S$PBB,,, | Performed by: FAMILY MEDICINE

## 2018-06-12 PROCEDURE — 93010 ELECTROCARDIOGRAM REPORT: CPT | Mod: S$PBB,,, | Performed by: INTERNAL MEDICINE

## 2018-06-12 PROCEDURE — 93005 ELECTROCARDIOGRAM TRACING: CPT | Mod: PBBFAC,PO | Performed by: INTERNAL MEDICINE

## 2018-06-12 NOTE — PROGRESS NOTES
"He Subjective:       Patient ID: Dana Winter is a 48 y.o. female.    Chief Complaint: Annual Exam    48-year-old  female patient with Patient Active Problem List:     DJD (degenerative joint disease) of cervical spine     Overweight (BMI 25.0-29.9)     Iron deficiency anemia due to chronic blood loss  Here for routine annual physicals.  Reports that she has been taking iron supplements twice daily secondary to heavy menstrual cycles causing anemia, LMP 5/14/1 8.   Patient has discontinued taking blood pressure medication for more than a month and blood pressure has been stable  Has been staying physically active but has not been exercising regularly  Denies any extreme fatigue, chest pain or difficulty breathing.   Denies any severe neck pain at this time  Patient did mention that she has been having left foot pain off and on lately and would like to get further evaluated secondary to a lump noted on the left foot      Review of Systems   Constitutional: Negative for fatigue.   Eyes: Negative for visual disturbance.   Respiratory: Negative for shortness of breath.    Cardiovascular: Negative for chest pain and leg swelling.   Gastrointestinal: Negative for abdominal pain, nausea and vomiting.   Genitourinary: Positive for menstrual problem.   Musculoskeletal: Positive for arthralgias and myalgias.   Skin: Negative for rash.   Neurological: Negative for weakness, light-headedness, numbness and headaches.   Psychiatric/Behavioral: Negative for sleep disturbance.         /86 (BP Location: Left arm, Patient Position: Sitting)   Pulse 71   Temp 98.1 °F (36.7 °C) (Tympanic)   Ht 5' 4" (1.626 m)   Wt 78 kg (171 lb 15.3 oz)   LMP 05/13/2018 (Exact Date)   SpO2 98%   BMI 29.52 kg/m²   Objective:      Physical Exam   Constitutional: She is oriented to person, place, and time. She appears well-developed and well-nourished.   HENT:   Head: Normocephalic and atraumatic.   Mouth/Throat: " Oropharynx is clear and moist.   Cardiovascular: Normal rate, regular rhythm and normal heart sounds.    No murmur heard.  Pulmonary/Chest: Effort normal and breath sounds normal. She has no wheezes.   Abdominal: Soft. Bowel sounds are normal. There is no tenderness.   Musculoskeletal: She exhibits tenderness. She exhibits no edema.   Positive for tenderness to the left foot dorsally in the midfoot with minimal swelling noted   Neurological: She is alert and oriented to person, place, and time.   Skin: Skin is warm and dry. No rash noted. No erythema.   Psychiatric: She has a normal mood and affect.         Assessment:       1. Routine general medical examination at a health care facility    2. Iron deficiency anemia due to chronic blood loss    3. Overweight (BMI 25.0-29.9)    4. Left foot pain        Plan:   Routine general medical examination at a health care facility  -     CBC auto differential; Future; Expected date: 06/12/2018  -     Comprehensive metabolic panel; Future; Expected date: 06/12/2018  -     Lipid panel; Future; Expected date: 06/12/2018  -     TSH; Future; Expected date: 06/12/2018  -     Vitamin D; Future; Expected date: 06/12/2018  -     EKG 12-lead; Future  Vital signs stable today.  Clinical exam stable.  Encouraged to start lifestyle modifications with low-fat and low-cholesterol diet and exercise 30 min daily    Iron deficiency anemia due to chronic blood loss  -     CBC auto differential; Future; Expected date: 06/12/2018  -     Ferritin; Future; Expected date: 06/12/2018  Currently taking iron supplements twice daily  Will recheck iron levels    Overweight (BMI 25.0-29.9)-lifestyle modifications recommended to lose weight with BMI 29    Left foot pain  -     X-Ray Foot Complete Left; Future; Expected date: 06/12/2018  Will get x-ray of the left foot to rule out ganglion cyst.   Warm compresses recommended at this time

## 2018-06-13 ENCOUNTER — HOSPITAL ENCOUNTER (OUTPATIENT)
Dept: RADIOLOGY | Facility: HOSPITAL | Age: 48
Discharge: HOME | End: 2018-06-13
Attending: FAMILY MEDICINE
Payer: MEDICAID

## 2018-06-13 DIAGNOSIS — M79.672 LEFT FOOT PAIN: ICD-10-CM

## 2018-06-13 PROCEDURE — 73630 X-RAY EXAM OF FOOT: CPT | Mod: 26,LT,, | Performed by: RADIOLOGY

## 2018-06-13 PROCEDURE — 73630 X-RAY EXAM OF FOOT: CPT | Mod: TC,FY,PO,LT

## 2018-06-14 ENCOUNTER — TELEPHONE (OUTPATIENT)
Dept: INTERNAL MEDICINE | Facility: CLINIC | Age: 48
End: 2018-06-14

## 2018-06-14 DIAGNOSIS — M79.672 LEFT FOOT PAIN: ICD-10-CM

## 2018-06-14 DIAGNOSIS — E55.9 VITAMIN D DEFICIENCY: Primary | ICD-10-CM

## 2018-06-14 DIAGNOSIS — D50.0 IRON DEFICIENCY ANEMIA DUE TO CHRONIC BLOOD LOSS: ICD-10-CM

## 2018-06-14 RX ORDER — ERGOCALCIFEROL 1.25 MG/1
50000 CAPSULE ORAL
Qty: 10 CAPSULE | Refills: 0 | Status: SHIPPED | OUTPATIENT
Start: 2018-06-14 | End: 2019-04-04

## 2018-06-14 RX ORDER — FERROUS SULFATE 325(65) MG
325 TABLET ORAL 2 TIMES DAILY
Qty: 60 TABLET | Refills: 3 | Status: SHIPPED | OUTPATIENT
Start: 2018-06-14 | End: 2019-04-04

## 2018-06-14 NOTE — TELEPHONE ENCOUNTER
Called pt regarding labs, no answer and unable to leave a message due to v/m not being set up. DAVID

## 2018-06-18 ENCOUNTER — TELEPHONE (OUTPATIENT)
Dept: INTERNAL MEDICINE | Facility: CLINIC | Age: 48
End: 2018-06-18

## 2018-06-18 NOTE — TELEPHONE ENCOUNTER
Spoke with pt, advised of medications and recommendations as well as referral to Podiatry regarding bony spur in foot. Appt scheduled for pt and verbalized understanding. DAVID

## 2018-07-13 ENCOUNTER — OFFICE VISIT (OUTPATIENT)
Dept: PODIATRY | Facility: CLINIC | Age: 48
End: 2018-07-13
Payer: MEDICAID

## 2018-07-13 VITALS
DIASTOLIC BLOOD PRESSURE: 97 MMHG | HEART RATE: 77 BPM | HEIGHT: 64 IN | SYSTOLIC BLOOD PRESSURE: 151 MMHG | BODY MASS INDEX: 29.74 KG/M2 | WEIGHT: 174.19 LBS

## 2018-07-13 DIAGNOSIS — M67.472 GANGLION CYST OF LEFT FOOT: Primary | ICD-10-CM

## 2018-07-13 DIAGNOSIS — M21.41 PES PLANUS OF BOTH FEET: ICD-10-CM

## 2018-07-13 DIAGNOSIS — M21.42 PES PLANUS OF BOTH FEET: ICD-10-CM

## 2018-07-13 DIAGNOSIS — M19.072 OSTEOARTHRITIS OF LEFT ANKLE AND FOOT: ICD-10-CM

## 2018-07-13 PROCEDURE — 99213 OFFICE O/P EST LOW 20 MIN: CPT | Mod: PBBFAC,PO | Performed by: PODIATRIST

## 2018-07-13 PROCEDURE — 99203 OFFICE O/P NEW LOW 30 MIN: CPT | Mod: S$PBB,,, | Performed by: PODIATRIST

## 2018-07-13 PROCEDURE — 99999 PR PBB SHADOW E&M-EST. PATIENT-LVL III: CPT | Mod: PBBFAC,,, | Performed by: PODIATRIST

## 2018-07-13 NOTE — PROGRESS NOTES
Subjective:     Patient ID: Dana Winter is a 48 y.o. female.    Chief Complaint: Foot Pain (knot on the left foot, no current pain per the patient )    Dana is a 48 y.o. female who presents to the podiatry clinic  with complaint of  left foot knot. Onset of the symptoms was several months ago. Precipitating event: none known. Current symptoms include: ability to bear weight, but with some pain. Aggravating factors: walking and certain shies. Symptoms have stabilized. Patient has had no prior foot problems. Evaluation to date: x-rays Treatment to date: avoidance of offending activity. Patients rates pain 0/10 on pain scale.      Patient Active Problem List   Diagnosis    DJD (degenerative joint disease) of cervical spine    Overweight (BMI 25.0-29.9)    Iron deficiency anemia due to chronic blood loss       Medication List with Changes/Refills   Current Medications    ERGOCALCIFEROL (ERGOCALCIFEROL) 50,000 UNIT CAP    Take 1 capsule (50,000 Units total) by mouth every 7 days.    FERROUS SULFATE 325 MG (65 MG IRON) TAB TABLET    Take 1 tablet (325 mg total) by mouth 2 (two) times daily.       Review of patient's allergies indicates:   Allergen Reactions    No known drug allergies        Past Surgical History:   Procedure Laterality Date    TUBAL LIGATION         Family History   Problem Relation Age of Onset    Hypertension Father     Stroke Father     Heart attack Father        Social History     Social History    Marital status:      Spouse name: N/A    Number of children: 4    Years of education: N/A     Occupational History    [employer]     Social History Main Topics    Smoking status: Never Smoker    Smokeless tobacco: Never Used    Alcohol use No    Drug use: No    Sexual activity: Yes     Partners: Male     Birth control/ protection: Surgical      Comment: Tubal     Other Topics Concern    Not on file     Social History Narrative    No narrative on file  "      Vitals:    07/13/18 0830 07/13/18 0837   BP: (!) 141/95 (!) 151/97   Pulse: 76 77   Weight: 79 kg (174 lb 2.6 oz)    Height: 5' 4" (1.626 m)    PainSc: 0-No pain        Review of Systems   Constitutional: Negative for chills and fever.   Respiratory: Negative for shortness of breath.    Cardiovascular: Negative for chest pain, palpitations, orthopnea, claudication and leg swelling.   Gastrointestinal: Negative for diarrhea, nausea and vomiting.   Musculoskeletal: Negative for joint pain.   Skin: Negative for rash.   Neurological: Negative for dizziness, tingling, sensory change, focal weakness and weakness.   Psychiatric/Behavioral: Negative.              Objective:       PHYSICAL EXAM: Apperance: Alert and orient in no distress,well developed, and with good attention to grooming and body habits  Patient presents ambulating in casual shoes.   Lower Extremity Physical Exam:  VASCULAR: Dorsalis pedis pulses 2/4 bilateral and Posterior Tibial pulses 2/4 bilateral. Capillary fill time <3 seconds bilateral. No edema observed bilateral. Varicosities absent bilateral. Skin temperature of the lower extremities is warm to warm, proximal to distal. Hair growth WNL bilateral.  DERMATOLOGICAL: No skin rashes, lesions, or ulcers observed bilateral. Minimal firm subcutaneous nodule noted to left midfoot.   NEUROLOGICAL: Light touch, sharp-dull, proprioception all present and equal bilaterally.    MUSCULOSKELETAL: Muscle strength is 5/5 for foot inverters, everters, plantarflexors, and dorsiflexors. Muscle tone is normal. (--) pain on palpation of left foot subcutaneous nodule.     TEST RESULTS: Radiographs of left foot taken 6/13/18reveals borderline pes planus.  Minimal multi articular degenerative changes noted throughout the foot.  Equivocal early plantar calcaneal spur.  No acute or healing fracture.  No radiopaque foreign body or soft tissue calcification.      Assessment:       Encounter Diagnoses   Name Primary?    " Ganglion cyst of left foot Yes    Osteoarthritis of left ankle and foot     Pes planus of both feet          Plan:   Ganglion cyst of left foot    Osteoarthritis of left ankle and foot    Pes planus of both feet      I counseled the patient on her conditions, regarding findings of my examination, my impressions, and usual treatment plan.  Reviewed left foot x-rays in exam room with patient.   Discussed surgical and conservative management of cyst deformity. Conservatively we did discuss padding, and shoe modifications such as softer shoes with wide toe boxes. Surgically we briefly discussed pre and post operative expectations. The patient elects for conservative management at this time   The patient and I reviewed the types of shoes she should be wearing, my recommendation includes generally the best time of the day for a shoe fitting is the afternoon, shoes with a wide toe box, very good cushion, and tennis shoes with removable inner soles.The patient and I reviewed my recommendations for over-the-counter orthotic inserts.   Patient to return as needed.             Blanca Acosta DPM  [facility]

## 2018-07-13 NOTE — LETTER
July 22, 2018      Susie Glass MD  [address] [facility] [address] [place] [address]           [facility]  [address] [place] [address]  Phone: [phone]  Fax: [phone]          Patient: Dana Winter   MR Number: [id number]   YOB: 1970   Date of Visit: 7/13/2018       Dear Dr. Susie Glass:    Thank you for referring Dana Winter to me for evaluation. Attached you will find relevant portions of my assessment and plan of care.    If you have questions, please do not hesitate to call me. I look forward to following Dana Winter along with you.    Sincerely,    Blanca Acosta DPM    Enclosure  CC:  No Recipients    If you would like to receive this communication electronically, please contact [email or web address] or [phone] to request more information on [employer] access.    For providers and/or their staff who would like to refer a patient to [facility], please contact us through our one-stop-shop provider referral line, [facility] , at [phone].    If you feel you have received this communication in error or would no longer like to receive these types of communications, please e-mail [email or web address]

## 2019-02-20 ENCOUNTER — TELEPHONE (OUTPATIENT)
Dept: OBSTETRICS AND GYNECOLOGY | Facility: CLINIC | Age: 49
End: 2019-02-20

## 2019-02-20 DIAGNOSIS — Z12.39 SCREENING FOR BREAST CANCER: Primary | ICD-10-CM

## 2019-02-20 NOTE — TELEPHONE ENCOUNTER
----- Message from Vashti Stratton sent at 2/20/2019  4:17 PM CST -----  Contact: Self  Pt is requesting orders for mammogram. Please call pt once orders have been placed in system. Please call pt back at [phone].      Thanks,   Vashit Stratton

## 2019-02-22 ENCOUNTER — HOSPITAL ENCOUNTER (OUTPATIENT)
Dept: RADIOLOGY | Facility: HOSPITAL | Age: 49
Discharge: HOME | End: 2019-02-22
Attending: NURSE PRACTITIONER
Payer: MEDICAID

## 2019-02-22 VITALS — WEIGHT: 174 LBS | BODY MASS INDEX: 29.71 KG/M2 | HEIGHT: 64 IN

## 2019-02-22 DIAGNOSIS — Z12.39 SCREENING FOR BREAST CANCER: ICD-10-CM

## 2019-02-22 PROCEDURE — 77063 MAMMO DIGITAL SCREENING BILAT WITH TOMOSYNTHESIS_CAD: ICD-10-PCS | Mod: 26,,, | Performed by: RADIOLOGY

## 2019-02-22 PROCEDURE — 77067 SCR MAMMO BI INCL CAD: CPT | Mod: TC

## 2019-02-22 PROCEDURE — 77067 MAMMO DIGITAL SCREENING BILAT WITH TOMOSYNTHESIS_CAD: ICD-10-PCS | Mod: 26,,, | Performed by: RADIOLOGY

## 2019-02-22 PROCEDURE — 77067 SCR MAMMO BI INCL CAD: CPT | Mod: 26,,, | Performed by: RADIOLOGY

## 2019-02-22 PROCEDURE — 77063 BREAST TOMOSYNTHESIS BI: CPT | Mod: 26,,, | Performed by: RADIOLOGY

## 2019-02-25 ENCOUNTER — PATIENT MESSAGE (OUTPATIENT)
Dept: OBSTETRICS AND GYNECOLOGY | Facility: CLINIC | Age: 49
End: 2019-02-25

## 2019-03-26 ENCOUNTER — TELEPHONE (OUTPATIENT)
Dept: INTERNAL MEDICINE | Facility: CLINIC | Age: 49
End: 2019-03-26

## 2019-03-26 NOTE — TELEPHONE ENCOUNTER
----- Message from Oziel Corona sent at 3/26/2019  1:36 PM CDT -----  Contact: self  Type:  Sooner Apoointment Request    Caller is requesting a sooner appointment.  Caller declined first available appointment listed below.  Caller will not accept being placed on the waitlist and is requesting a message be sent to doctor.  Name of Caller:vania milan  When is the first available appointment?n/a  Symptoms:n/  Would the patient rather a call back or a response via [facility]? Call back  Best Call Back Number:[phone]  Additional Information: none    Thanks,  Oziel Corona

## 2019-03-26 NOTE — TELEPHONE ENCOUNTER
Spoke with pt. Scheduled pt with next available provider 4/4/19 at 2 pm. Pt was thankful for the call back.Call ended well.

## 2019-04-04 ENCOUNTER — LAB VISIT (OUTPATIENT)
Dept: LAB | Facility: HOSPITAL | Age: 49
End: 2019-04-04
Attending: NURSE PRACTITIONER
Payer: MEDICAID

## 2019-04-04 ENCOUNTER — OFFICE VISIT (OUTPATIENT)
Dept: INTERNAL MEDICINE | Facility: CLINIC | Age: 49
End: 2019-04-04
Payer: MEDICAID

## 2019-04-04 VITALS
DIASTOLIC BLOOD PRESSURE: 90 MMHG | HEART RATE: 89 BPM | OXYGEN SATURATION: 98 % | HEIGHT: 64 IN | WEIGHT: 174.38 LBS | TEMPERATURE: 99 F | BODY MASS INDEX: 29.77 KG/M2 | SYSTOLIC BLOOD PRESSURE: 152 MMHG

## 2019-04-04 DIAGNOSIS — D50.0 IRON DEFICIENCY ANEMIA DUE TO CHRONIC BLOOD LOSS: ICD-10-CM

## 2019-04-04 DIAGNOSIS — I10 ESSENTIAL HYPERTENSION: Primary | ICD-10-CM

## 2019-04-04 DIAGNOSIS — E55.9 VITAMIN D DEFICIENCY: ICD-10-CM

## 2019-04-04 DIAGNOSIS — E66.3 OVERWEIGHT (BMI 25.0-29.9): ICD-10-CM

## 2019-04-04 DIAGNOSIS — I10 ESSENTIAL HYPERTENSION: ICD-10-CM

## 2019-04-04 LAB
25(OH)D3+25(OH)D2 SERPL-MCNC: 6 NG/ML (ref 30–96)
ALBUMIN SERPL BCP-MCNC: 3.2 G/DL (ref 3.5–5.2)
ALP SERPL-CCNC: 66 U/L (ref 55–135)
ALT SERPL W/O P-5'-P-CCNC: 8 U/L (ref 10–44)
ANION GAP SERPL CALC-SCNC: 8 MMOL/L (ref 8–16)
AST SERPL-CCNC: 16 U/L (ref 10–40)
BASOPHILS # BLD AUTO: 0.05 K/UL (ref 0–0.2)
BASOPHILS NFR BLD: 1 % (ref 0–1.9)
BILIRUB SERPL-MCNC: 0.2 MG/DL (ref 0.1–1)
BUN SERPL-MCNC: 10 MG/DL (ref 6–20)
CALCIUM SERPL-MCNC: 9.2 MG/DL (ref 8.7–10.5)
CHLORIDE SERPL-SCNC: 107 MMOL/L (ref 95–110)
CO2 SERPL-SCNC: 26 MMOL/L (ref 23–29)
CREAT SERPL-MCNC: 0.8 MG/DL (ref 0.5–1.4)
DIFFERENTIAL METHOD: ABNORMAL
EOSINOPHIL # BLD AUTO: 0 K/UL (ref 0–0.5)
EOSINOPHIL NFR BLD: 0.4 % (ref 0–8)
ERYTHROCYTE [DISTWIDTH] IN BLOOD BY AUTOMATED COUNT: 15.2 % (ref 11.5–14.5)
EST. GFR  (AFRICAN AMERICAN): >60 ML/MIN/1.73 M^2
EST. GFR  (NON AFRICAN AMERICAN): >60 ML/MIN/1.73 M^2
GLUCOSE SERPL-MCNC: 83 MG/DL (ref 70–110)
HCT VFR BLD AUTO: 32 % (ref 37–48.5)
HGB BLD-MCNC: 9.8 G/DL (ref 12–16)
IMM GRANULOCYTES # BLD AUTO: 0.02 K/UL (ref 0–0.04)
IMM GRANULOCYTES NFR BLD AUTO: 0.4 % (ref 0–0.5)
LYMPHOCYTES # BLD AUTO: 1.3 K/UL (ref 1–4.8)
LYMPHOCYTES NFR BLD: 26.8 % (ref 18–48)
MCH RBC QN AUTO: 24.3 PG (ref 27–31)
MCHC RBC AUTO-ENTMCNC: 30.6 G/DL (ref 32–36)
MCV RBC AUTO: 79 FL (ref 82–98)
MONOCYTES # BLD AUTO: 0.4 K/UL (ref 0.3–1)
MONOCYTES NFR BLD: 8.5 % (ref 4–15)
NEUTROPHILS # BLD AUTO: 3.1 K/UL (ref 1.8–7.7)
NEUTROPHILS NFR BLD: 62.9 % (ref 38–73)
NRBC BLD-RTO: 0 /100 WBC
PLATELET # BLD AUTO: 402 K/UL (ref 150–350)
PMV BLD AUTO: 9.7 FL (ref 9.2–12.9)
POTASSIUM SERPL-SCNC: 3.5 MMOL/L (ref 3.5–5.1)
PROT SERPL-MCNC: 6.9 G/DL (ref 6–8.4)
RBC # BLD AUTO: 4.03 M/UL (ref 4–5.4)
SODIUM SERPL-SCNC: 141 MMOL/L (ref 136–145)
TRANSFERRIN SERPL-MCNC: 314 MG/DL (ref 200–375)
WBC # BLD AUTO: 4.92 K/UL (ref 3.9–12.7)

## 2019-04-04 PROCEDURE — 82306 VITAMIN D 25 HYDROXY: CPT

## 2019-04-04 PROCEDURE — 99999 PR PBB SHADOW E&M-EST. PATIENT-LVL III: ICD-10-PCS | Mod: PBBFAC,,, | Performed by: NURSE PRACTITIONER

## 2019-04-04 PROCEDURE — 83540 ASSAY OF IRON: CPT

## 2019-04-04 PROCEDURE — 80053 COMPREHEN METABOLIC PANEL: CPT

## 2019-04-04 PROCEDURE — 99214 OFFICE O/P EST MOD 30 MIN: CPT | Mod: S$PBB,,, | Performed by: NURSE PRACTITIONER

## 2019-04-04 PROCEDURE — 99214 PR OFFICE/OUTPT VISIT, EST, LEVL IV, 30-39 MIN: ICD-10-PCS | Mod: S$PBB,,, | Performed by: NURSE PRACTITIONER

## 2019-04-04 PROCEDURE — 85025 COMPLETE CBC W/AUTO DIFF WBC: CPT

## 2019-04-04 PROCEDURE — 99999 PR PBB SHADOW E&M-EST. PATIENT-LVL III: CPT | Mod: PBBFAC,,, | Performed by: NURSE PRACTITIONER

## 2019-04-04 PROCEDURE — 99213 OFFICE O/P EST LOW 20 MIN: CPT | Mod: PBBFAC,PN | Performed by: NURSE PRACTITIONER

## 2019-04-04 PROCEDURE — 36415 COLL VENOUS BLD VENIPUNCTURE: CPT

## 2019-04-04 RX ORDER — FERROUS SULFATE 325(65) MG
325 TABLET ORAL 2 TIMES DAILY
Qty: 180 TABLET | Refills: 0 | Status: SHIPPED | OUTPATIENT
Start: 2019-04-04 | End: 2019-07-03

## 2019-04-04 RX ORDER — LOSARTAN POTASSIUM 50 MG/1
50 TABLET ORAL DAILY
Qty: 90 TABLET | Refills: 3 | Status: SHIPPED | OUTPATIENT
Start: 2019-04-04 | End: 2019-04-25

## 2019-04-04 RX ORDER — ERGOCALCIFEROL 1.25 MG/1
50000 CAPSULE ORAL
Qty: 10 CAPSULE | Refills: 0 | Status: SHIPPED | OUTPATIENT
Start: 2019-04-04 | End: 2019-11-13

## 2019-04-04 RX ORDER — HYDROCHLOROTHIAZIDE 12.5 MG/1
12.5 CAPSULE ORAL DAILY
Qty: 90 CAPSULE | Refills: 3 | Status: SHIPPED | OUTPATIENT
Start: 2019-04-04 | End: 2019-11-13 | Stop reason: DRUGHIGH

## 2019-04-04 NOTE — PROGRESS NOTES
Subjective:       Patient ID: Dana Winter is a 49 y.o. female.    Chief Complaint: Follow-up    HPI    Pt here for follow up    HTN-pt was on hyzaar but stopped with no apparent known reason for doing so. BP has been [id number]-100s. Check BP once daily. Has intermittent headaches. Eats high sodium diet. Does not exercise. Does not smoke. Wants to restart BP meds    Vit D def- has not been taking vit D. Feels tired most times    Iron def- has not been taking iron tablets. Reportedly eats ice all day long.      Past Medical History:   Diagnosis Date    DJD (degenerative joint disease) of cervical spine     on PT    Hypertension      Past Surgical History:   Procedure Laterality Date    TUBAL LIGATION       Social History     Socioeconomic History    Marital status:      Spouse name: Not on file    Number of children: 4    Years of education: Not on file    Highest education level: Not on file   Occupational History    Occupation: [employer]     Employer: [employer]   Social Needs    Financial resource strain: Not on file    Food insecurity:     Worry: Not on file     Inability: Not on file    Transportation needs:     Medical: Not on file     Non-medical: Not on file   Tobacco Use    Smoking status: Never Smoker    Smokeless tobacco: Never Used   Substance and Sexual Activity    Alcohol use: No    Drug use: No    Sexual activity: Yes     Partners: Male     Birth control/protection: Surgical     Comment: Tubal   Lifestyle    Physical activity:     Days per week: Not on file     Minutes per session: Not on file    Stress: Not on file   Relationships    Social connections:     Talks on phone: Not on file     Gets together: Not on file     Attends Rastafari service: Not on file     Active member of club or organization: Not on file     Attends meetings of clubs or organizations: Not on file     Relationship status: Not on file   Other Topics Concern    Not on file   Social History  Narrative    Not on file     Review of patient's allergies indicates:   Allergen Reactions    No known drug allergies      Current Outpatient Medications   Medication Sig    ergocalciferol (ERGOCALCIFEROL) 50,000 unit Cap Take 1 capsule (50,000 Units total) by mouth every 7 days.    ferrous sulfate (FEOSOL) 325 mg (65 mg iron) Tab tablet Take 1 tablet (325 mg total) by mouth 2 (two) times daily.    hydroCHLOROthiazide (MICROZIDE) 12.5 mg capsule Take 1 capsule (12.5 mg total) by mouth once daily.    losartan (COZAAR) 50 MG tablet Take 1 tablet (50 mg total) by mouth once daily.     No current facility-administered medications for this visit.            Review of Systems   Constitutional: Negative for activity change, appetite change, chills, diaphoresis, fatigue, fever and unexpected weight change.   HENT: Negative for congestion, ear pain, postnasal drip, rhinorrhea, sinus pressure, sinus pain, sneezing, sore throat, tinnitus, trouble swallowing and voice change.    Eyes: Negative for photophobia, pain and visual disturbance.   Respiratory: Negative for cough, chest tightness, shortness of breath and wheezing.    Cardiovascular: Negative for chest pain, palpitations and leg swelling.   Gastrointestinal: Negative for abdominal distention, abdominal pain, constipation, diarrhea, nausea and vomiting.   Genitourinary: Negative for decreased urine volume, difficulty urinating, dysuria, flank pain, frequency, hematuria and urgency.   Musculoskeletal: Negative for arthralgias, back pain, joint swelling, neck pain and neck stiffness.   Allergic/Immunologic: Negative for immunocompromised state.   Neurological: Positive for headaches. Negative for dizziness, tremors, seizures, syncope, facial asymmetry, speech difficulty, weakness, light-headedness and numbness.   Hematological: Negative for adenopathy. Does not bruise/bleed easily.   Psychiatric/Behavioral: Negative for confusion and sleep disturbance.        Objective:      Physical Exam   Constitutional: She is oriented to person, place, and time.   HENT:   Head: Normocephalic and atraumatic.   Right Ear: Tympanic membrane normal.   Left Ear: Tympanic membrane normal.   Eyes: Conjunctivae and EOM are normal.   Neck: Normal range of motion. Neck supple.   Cardiovascular: Normal rate, regular rhythm, normal heart sounds and intact distal pulses.   Pulmonary/Chest: Effort normal and breath sounds normal.   Abdominal: Soft. Bowel sounds are normal.   Musculoskeletal: Normal range of motion.   Neurological: She is alert and oriented to person, place, and time.   Skin: Skin is warm and dry. Capillary refill takes less than 2 seconds.   Psychiatric: She has a normal mood and affect.       Assessment:     Vitals:    04/04/19 1350   BP: (!) 152/90   Pulse: 89   Temp: 99 °F (37.2 °C)         1. Essential hypertension    2. Iron deficiency anemia due to chronic blood loss    3. Overweight (BMI 25.0-29.9)    4. Vitamin D deficiency        Plan:   Essential hypertension  -     Comprehensive metabolic panel; Future; Expected date: 04/04/2019  -     losartan (COZAAR) 50 MG tablet; Take 1 tablet (50 mg total) by mouth once daily.  Dispense: 90 tablet; Refill: 3  -     hydroCHLOROthiazide (MICROZIDE) 12.5 mg capsule; Take 1 capsule (12.5 mg total) by mouth once daily.  Dispense: 90 capsule; Refill: 3    Iron deficiency anemia due to chronic blood loss  -     CBC auto differential; Future; Expected date: 04/04/2019  -     TRANSFERRIN; Future; Expected date: 04/04/2019  -     Iron and TIBC; Future; Expected date: 04/04/2019  -     ferrous sulfate (FEOSOL) 325 mg (65 mg iron) Tab tablet; Take 1 tablet (325 mg total) by mouth 2 (two) times daily.  Dispense: 180 tablet; Refill: 0    Overweight (BMI 25.0-29.9)    Vitamin D deficiency  -     Vitamin D; Future; Expected date: 04/04/2019  -     ergocalciferol (ERGOCALCIFEROL) 50,000 unit Cap; Take 1 capsule (50,000 Units total) by mouth every  7 days.  Dispense: 10 capsule; Refill: 0        Restart BP meds/BP diary  F/u 2-3 weeks  Labs today  Restart vit D, iron  D and E discussed

## 2019-04-05 LAB
IRON SERPL-MCNC: 16 UG/DL (ref 30–160)
SATURATED IRON: 3 % (ref 20–50)
TOTAL IRON BINDING CAPACITY: 465 UG/DL (ref 250–450)
TRANSFERRIN SERPL-MCNC: 314 MG/DL (ref 200–375)

## 2019-04-25 ENCOUNTER — OFFICE VISIT (OUTPATIENT)
Dept: INTERNAL MEDICINE | Facility: CLINIC | Age: 49
End: 2019-04-25
Payer: MEDICAID

## 2019-04-25 VITALS
HEIGHT: 64 IN | BODY MASS INDEX: 29.43 KG/M2 | SYSTOLIC BLOOD PRESSURE: 140 MMHG | WEIGHT: 172.38 LBS | HEART RATE: 75 BPM | DIASTOLIC BLOOD PRESSURE: 88 MMHG | OXYGEN SATURATION: 95 % | TEMPERATURE: 98 F

## 2019-04-25 DIAGNOSIS — I10 ESSENTIAL HYPERTENSION: ICD-10-CM

## 2019-04-25 PROCEDURE — 99213 OFFICE O/P EST LOW 20 MIN: CPT | Mod: S$PBB,,, | Performed by: NURSE PRACTITIONER

## 2019-04-25 PROCEDURE — 99999 PR PBB SHADOW E&M-EST. PATIENT-LVL III: CPT | Mod: PBBFAC,,, | Performed by: NURSE PRACTITIONER

## 2019-04-25 PROCEDURE — 99213 OFFICE O/P EST LOW 20 MIN: CPT | Mod: PBBFAC,PN | Performed by: NURSE PRACTITIONER

## 2019-04-25 PROCEDURE — 99999 PR PBB SHADOW E&M-EST. PATIENT-LVL III: ICD-10-PCS | Mod: PBBFAC,,, | Performed by: NURSE PRACTITIONER

## 2019-04-25 PROCEDURE — 99213 PR OFFICE/OUTPT VISIT, EST, LEVL III, 20-29 MIN: ICD-10-PCS | Mod: S$PBB,,, | Performed by: NURSE PRACTITIONER

## 2019-04-25 RX ORDER — LOSARTAN POTASSIUM 50 MG/1
100 TABLET ORAL DAILY
Qty: 180 TABLET | Refills: 3
Start: 2019-04-25 | End: 2019-11-13 | Stop reason: DRUGHIGH

## 2019-04-25 NOTE — PROGRESS NOTES
Subjective:       Patient ID: Dana Winter is a 49 y.o. female.    Chief Complaint: Hypertension (BP review)    HPI    Pt here for follow up on HTN  BP still not at goal  Taking BP sporadically - when she does take it ranges 150s-90s/100s  Reports intermittent headaches  Not following low sodium diet  Does not exercise routinely  Denies cardiac or focal neuro s/s     Past Medical History:   Diagnosis Date    DJD (degenerative joint disease) of cervical spine     on PT    Hypertension      Past Surgical History:   Procedure Laterality Date    TUBAL LIGATION       Past Surgical History:   Procedure Laterality Date    TUBAL LIGATION       Social History     Socioeconomic History    Marital status:      Spouse name: Not on file    Number of children: 4    Years of education: Not on file    Highest education level: Not on file   Occupational History    Occupation: [employer]     Employer: [employer]   Social Needs    Financial resource strain: Not on file    Food insecurity:     Worry: Not on file     Inability: Not on file    Transportation needs:     Medical: Not on file     Non-medical: Not on file   Tobacco Use    Smoking status: Never Smoker    Smokeless tobacco: Never Used   Substance and Sexual Activity    Alcohol use: No    Drug use: No    Sexual activity: Yes     Partners: Male     Birth control/protection: Surgical     Comment: Tubal   Lifestyle    Physical activity:     Days per week: Not on file     Minutes per session: Not on file    Stress: Not on file   Relationships    Social connections:     Talks on phone: Not on file     Gets together: Not on file     Attends Confucianist service: Not on file     Active member of club or organization: Not on file     Attends meetings of clubs or organizations: Not on file     Relationship status: Not on file   Other Topics Concern    Not on file   Social History Narrative    Not on file     Review of patient's allergies indicates:    Allergen Reactions    No known drug allergies      Current Outpatient Medications   Medication Sig    ergocalciferol (ERGOCALCIFEROL) 50,000 unit Cap Take 1 capsule (50,000 Units total) by mouth every 7 days.    ferrous sulfate (FEOSOL) 325 mg (65 mg iron) Tab tablet Take 1 tablet (325 mg total) by mouth 2 (two) times daily.    hydroCHLOROthiazide (MICROZIDE) 12.5 mg capsule Take 1 capsule (12.5 mg total) by mouth once daily.    losartan (COZAAR) 50 MG tablet Take 2 tablets (100 mg total) by mouth once daily.     No current facility-administered medications for this visit.            Review of Systems   Constitutional: Negative for activity change, appetite change, chills, diaphoresis, fatigue, fever and unexpected weight change.   HENT: Negative for congestion, ear pain, postnasal drip, rhinorrhea, sinus pressure, sinus pain, sneezing, sore throat, tinnitus, trouble swallowing and voice change.    Eyes: Negative for photophobia, pain and visual disturbance.   Respiratory: Negative for cough, chest tightness, shortness of breath and wheezing.    Cardiovascular: Negative for chest pain, palpitations and leg swelling.   Gastrointestinal: Negative for abdominal distention, abdominal pain, constipation, diarrhea, nausea and vomiting.   Genitourinary: Negative for decreased urine volume, difficulty urinating, dysuria, flank pain, frequency, hematuria and urgency.   Musculoskeletal: Negative for arthralgias, back pain, joint swelling, neck pain and neck stiffness.   Allergic/Immunologic: Negative for immunocompromised state.   Neurological: Positive for headaches. Negative for dizziness, tremors, seizures, syncope, facial asymmetry, speech difficulty, weakness, light-headedness and numbness.   Hematological: Negative for adenopathy. Does not bruise/bleed easily.   Psychiatric/Behavioral: Negative for confusion and sleep disturbance.       Objective:      Physical Exam   Constitutional: She is oriented to person,  place, and time.   HENT:   Head: Normocephalic and atraumatic.   Right Ear: Tympanic membrane normal.   Left Ear: Tympanic membrane normal.   Eyes: Conjunctivae and EOM are normal.   Neck: Normal range of motion. Neck supple.   Cardiovascular: Normal rate, regular rhythm, normal heart sounds and intact distal pulses.   Pulmonary/Chest: Effort normal and breath sounds normal.   Abdominal: Soft. Bowel sounds are normal.   Musculoskeletal: Normal range of motion.   Neurological: She is alert and oriented to person, place, and time.   Skin: Skin is warm and dry.       Assessment:     Vitals:    04/25/19 1425   BP: (!) 140/88   Pulse: 75   Temp: 98.1 °F (36.7 °C)         1. Essential hypertension        Plan:   Essential hypertension  -     losartan (COZAAR) 50 MG tablet; Take 2 tablets (100 mg total) by mouth once daily.  Dispense: 180 tablet; Refill: 3  -     Hypertension Digital Medicine (HDMP) Enrollment Order  -     Hypertension Digital Medicine (HDMP): Assign Onboarding Questionnaires          Increase losartan from 50 mg to 100 mg daily. Continue hctz  Low sodium diet  HTN program enrollment

## 2019-08-21 ENCOUNTER — PATIENT OUTREACH (OUTPATIENT)
Dept: ADMINISTRATIVE | Facility: HOSPITAL | Age: 49
End: 2019-08-21

## 2019-11-05 ENCOUNTER — TELEPHONE (OUTPATIENT)
Dept: OBSTETRICS AND GYNECOLOGY | Facility: CLINIC | Age: 49
End: 2019-11-05

## 2019-11-05 NOTE — TELEPHONE ENCOUNTER
----- Message from Reyna Lomeli sent at 11/5/2019  8:52 AM CST -----  Contact: Pt  Pt asked that nurse contact her regarding a sooner appointment, pt states she is having issues with her breat and she is very concerned. Please contact pt at ([phone])

## 2019-11-06 ENCOUNTER — OFFICE VISIT (OUTPATIENT)
Dept: OBSTETRICS AND GYNECOLOGY | Facility: CLINIC | Age: 49
End: 2019-11-06
Payer: COMMERCIAL

## 2019-11-06 ENCOUNTER — PATIENT OUTREACH (OUTPATIENT)
Dept: ADMINISTRATIVE | Facility: HOSPITAL | Age: 49
End: 2019-11-06

## 2019-11-06 VITALS
WEIGHT: 174.63 LBS | HEIGHT: 64 IN | SYSTOLIC BLOOD PRESSURE: 162 MMHG | DIASTOLIC BLOOD PRESSURE: 100 MMHG | BODY MASS INDEX: 29.81 KG/M2

## 2019-11-06 DIAGNOSIS — N63.10 BREAST MASS, RIGHT: Primary | ICD-10-CM

## 2019-11-06 DIAGNOSIS — N64.4 BREAST PAIN, RIGHT: ICD-10-CM

## 2019-11-06 PROCEDURE — 99213 OFFICE O/P EST LOW 20 MIN: CPT | Mod: S$GLB,,, | Performed by: NURSE PRACTITIONER

## 2019-11-06 PROCEDURE — 3077F SYST BP >= 140 MM HG: CPT | Mod: CPTII,S$GLB,, | Performed by: NURSE PRACTITIONER

## 2019-11-06 PROCEDURE — 99213 PR OFFICE/OUTPT VISIT, EST, LEVL III, 20-29 MIN: ICD-10-PCS | Mod: S$GLB,,, | Performed by: NURSE PRACTITIONER

## 2019-11-06 PROCEDURE — 99999 PR PBB SHADOW E&M-EST. PATIENT-LVL III: CPT | Mod: PBBFAC,,, | Performed by: NURSE PRACTITIONER

## 2019-11-06 PROCEDURE — 3008F BODY MASS INDEX DOCD: CPT | Mod: CPTII,S$GLB,, | Performed by: NURSE PRACTITIONER

## 2019-11-06 PROCEDURE — 3008F PR BODY MASS INDEX (BMI) DOCUMENTED: ICD-10-PCS | Mod: CPTII,S$GLB,, | Performed by: NURSE PRACTITIONER

## 2019-11-06 PROCEDURE — 3077F PR MOST RECENT SYSTOLIC BLOOD PRESSURE >= 140 MM HG: ICD-10-PCS | Mod: CPTII,S$GLB,, | Performed by: NURSE PRACTITIONER

## 2019-11-06 PROCEDURE — 99999 PR PBB SHADOW E&M-EST. PATIENT-LVL III: ICD-10-PCS | Mod: PBBFAC,,, | Performed by: NURSE PRACTITIONER

## 2019-11-06 PROCEDURE — 3080F PR MOST RECENT DIASTOLIC BLOOD PRESSURE >= 90 MM HG: ICD-10-PCS | Mod: CPTII,S$GLB,, | Performed by: NURSE PRACTITIONER

## 2019-11-06 PROCEDURE — 3080F DIAST BP >= 90 MM HG: CPT | Mod: CPTII,S$GLB,, | Performed by: NURSE PRACTITIONER

## 2019-11-06 NOTE — PROGRESS NOTES
Dana Winter is a 49 y.o. female  presents for right breast pain, noted on Saturday after taking bra off and then did an exam feeling nodules in area of discomfort.  Pts cycle did start yesterday, declines annual today.   MMG in 2019 negative,  No family history of breast issues.  Does drink tea but no soft drinks.       LMP: Patient's last menstrual period was 2019..      Past Medical History:   Diagnosis Date    DJD (degenerative joint disease) of cervical spine     on PT    Hypertension      Past Surgical History:   Procedure Laterality Date    TUBAL LIGATION       Social History     Socioeconomic History    Marital status:      Spouse name: Not on file    Number of children: 4    Years of education: Not on file    Highest education level: Not on file   Occupational History    Occupation: [employer]     Employer: [employer]   Social Needs    Financial resource strain: Not on file    Food insecurity:     Worry: Not on file     Inability: Not on file    Transportation needs:     Medical: Not on file     Non-medical: Not on file   Tobacco Use    Smoking status: Never Smoker    Smokeless tobacco: Never Used   Substance and Sexual Activity    Alcohol use: No    Drug use: No    Sexual activity: Yes     Partners: Male     Birth control/protection: Surgical     Comment: Tubal   Lifestyle    Physical activity:     Days per week: Not on file     Minutes per session: Not on file    Stress: Not on file   Relationships    Social connections:     Talks on phone: Not on file     Gets together: Not on file     Attends Islam service: Not on file     Active member of club or organization: Not on file     Attends meetings of clubs or organizations: Not on file     Relationship status: Not on file   Other Topics Concern    Not on file   Social History Narrative    Not on file     Family History   Problem Relation Age of Onset    Hypertension Father     Stroke Father      "Heart attack Father      OB History        4    Para   4    Term   4            AB        Living   4       SAB        TAB        Ectopic        Multiple        Live Births                     BP (!) 162/100   Ht 5' 4" (1.626 m)   Wt 79.2 kg (174 lb 9.7 oz)   LMP 2019   BMI 29.97 kg/m²       ROS:  Per hpi    PHYSICAL EXAM:  APPEARANCE: Well nourished, well developed, in no acute distress.  AFFECT: WNL, alert and oriented x 3  BREASTS: Symmetrical, no skin changes or visible lesions.  palpable mass x 2 noted in right breast at upper outer quadrant close to axilla, pt denies change in deodorant, area is tender with palpation of round small nodules that appear to be superficial, no  nipple discharge bilaterally, no axillary nodes noted, left breast normal .  Physical Exam:        Pulmonary/Chest:                                1. Breast mass, right  Mammo Digital Diagnostic Bilateral With CAD    US Breast Right Complete   2. Breast pain, right  Mammo Digital Diagnostic Bilateral With CAD    US Breast Right Complete    AND PLAN:      Check mmg and u/s  F/u general surgery                 "

## 2019-11-06 NOTE — PATIENT INSTRUCTIONS
Breast Anatomy    Breasts come in all shapes and sizes. But they all share the same features. You can learn about the parts, or anatomy, of your breasts. This will help you know what you're seeing and feeling. Then you can learn what's normal for your breasts.     The areola is a dark [place] of skin that surrounds the nipple.  The nipple is the outlet for milk during breastfeeding.  Fibrous tissue supports your breasts, making them feel firm.  Lobules (mammary glands) produce milk during pregnancy and breastfeeding.  Ducts carry milk from the lobules during breastfeeding.  Fatty tissue fills the spaces around the ducts and lobules.  Axillary lymph nodes filter lymph fluid from your breast and help your body fight infection.  Ribs can be felt beneath the skin.  The clavicle (collarbone) marks the upper boundary of the breast tissue.  The sternum (breastbone) can be felt beneath the skin.  Chest muscles help move your arm.     Date Last Reviewed: 8/13/2015  © 9650-7215 [employer]. [address] [place] [address]. All rights reserved. This information is not intended as a substitute for professional medical care. Always follow your healthcare professional's instructions.        What Are Benign Breast Conditions?  Most breast conditions are benign (noncancerous), causing no serious harm to you. But all women are at some risk for breast cancer. Your risk increases as you grow older. So if you notice any breast changes that arent normal for you, see your healthcare provider. This helps to make sure that you receive proper treatment if there is a problem.  Breast infection  Infections of the breast tissue can cause skin redness, warmth, and pain or tenderness. The most common infection is mastitis. This inflammation of the mammary glands can happen during breastfeeding. Mastitis and other breast infections are often treated with antibiotics.  Nipple discharge  Many women can squeeze a tiny  amount of a clear or milky discharge from 1 or both nipples. This discharge may be normal. Other kinds of discharge may be symptoms of a breast condition. A dark discharge can be caused by an intraductal papilloma (a benign growth in a duct near the nipple). A nipple discharge that is dark or bloody, or that happens without squeezing your nipple, should be checked by your healthcare provider.    Fibrocystic changes  These benign changes may cause a thickening in the breasts. Breasts may be tender or painful. They may feel more dense in some areas than in others. Sometimes solid or fluid-filled lumps (cysts) may also form. Cysts may be smooth, soft or firm, and tender. They may become larger and more tender right before your period.    Benign breast lumps  Benign breast lumps come in all shapes, textures, and sizes. A fibroadenoma (a lump of fibrous tissue) may be smooth, firm, and rubbery. This type of lump is usually painless and movable. Have any lump checked by your healthcare provider.  Date Last Reviewed: 8/13/2015 © 2000-2017 [employer]. [address] [place] [address]. All rights reserved. This information is not intended as a substitute for professional medical care. Always follow your healthcare professional's instructions.        Breast Lump, Uncertain Cause    A lump was found in your breast. Most breast lumps are not cancer. They may be caused by normal changes in the breast tissue due to hormone variations that occur with your menstrual cycle. Some women may form lumps that are painful and tender. Others may form lumps that are painless.  At this time, is not possible to be certain of the cause of your lump without further evaluation. This could include:  · Another exam by your healthcare provider or a gynecologist  · Imaging tests, such as a mammogram or ultrasound  · Biopsy (procedure to remove small tissue samples from the breast lump)  Your healthcare provider will explain  any additional testing that is needed. Be sure to get answers to any questions you may have.  Home care  Until a diagnosis is made, you may be advised to do the following:  · If you are having breast pain:  ¨ Take an over-the-counter pain reliever, if directed to by your provider.  ¨ Wear a well-fitted bra or sports bra for extra support. If you have breast pain at night, try wearing the bra during sleep.  ¨ Apply a warm compress (towel soaked in warm water) to the breast. You may also use a hot water bottle.  · Check your breasts each day. Keep a log of whether the lump seems to be changing in size or tenderness with your period. This can help your healthcare provider make the correct diagnosis.  Follow-up care  Follow up with your healthcare provider, or as directed. Keep all appointments. Also, prepare for any upcoming tests as directed.  When to seek medical advice  Call your healthcare provider right away if any of these occur:  · Fever of 100.4°F (38°C) or higher  · Redness or swelling of the breast  · Discharge from the nipple  · Visible changes in the skin over the nipple or breast  · Lump grows larger, feels very hard, or has an irregular shape  · New lumps form  Date Last Reviewed: 3/1/2017  © 8375-7503 [employer]. [address] [place] [address], [place] [address]. All rights reserved. This information is not intended as a substitute for professional medical care. Always follow your healthcare professional's instructions.        Fibrocystic Breast Changes (Presumed)  One or more lumps were found in your breasts. These are likely fibrocystic breast changes.   With this condition, fibrous tissue and small cysts form in your breasts. They may increase and decrease in size with your menstrual cycle. Symptoms can include breast lumps that you can see and feel. You may also have breast pain, swelling, and tenderness.  The lumps associated with fibrocystic breast changes are not cancer, and do not lead to  cancer. They are very common, and affect most women at some point in their lives.  Treatment for fibrocystic breast changes is rarely needed. Home care to relieve symptoms may be recommended, though.  If confirmation of the diagnosis is desired or needed, further evaluation may be done. This can include:  · Another exam by your healthcare provider or a gynecologist  · Imaging tests, such as a mammogram or ultrasound  · Biopsy (procedure to remove small tissue samples from the breast lumps)  Youll be told more about these tests, if needed.  Home care  · If you are having breast pain:  ¨ Take an over-the-counter pain reliever, if directed to by your provider.  ¨ Wear a well-fitted bra or sports bra for extra support. If you have breast pain at night, try wearing the bra during sleep.  ¨ Apply a warm compress (towel soaked in warm water) to the breast. You may also use a hot water bottle.  · Check your breasts each day. Keep a log of whether the lump seems to be changing in size or tenderness with your period. This can help your healthcare provider learn more about your breast condition.  · Ask your healthcare provider about lifestyle or diet changes or alternative treatments you can try to help treat this condition.  Follow-up care  Follow with your healthcare provider, or as directed. You may be advised to schedule another appointment for a breast exam with your provider or a gynecologist about 7 to 10 days after the start of your next period. If fibrocystic breast changes begin to cause symptoms that bother you, tell your provider. He or she may recommend other treatments.  When to seek medical advice  Call your healthcare provider right away if any of these occur:  · Fever of 100.4°F (38°C) or higher  · Redness or swelling of the breasts  · Discharge from the nipples  · Visible changes in the skin over the nipples or breasts  · Lumps grow larger, feel very hard, or have an irregular shape  · New lumps form that  look or feel different from current lumps  Date Last Reviewed: 3/1/2017  © 2364-2855 [employer]. [address] [place] [address], [place] [address]. All rights reserved. This information is not intended as a substitute for professional medical care. Always follow your healthcare professional's instructions.        What Are Fibrocystic Breasts?  Do your breasts ever feel lumpy, sore, or tender? If so, you may have fibrocystic breasts. This is a very common condition. It is not a disease, and it is not cancer. Your healthcare provider can rule out problems and help you ease your symptoms.    Normal breasts  Your breasts are made up of 3 kinds of tissue:  · Glandular tissue is made up of the milk ducts and glands.  · Fibrous tissue supports the breast.  · Fatty tissue fills the spaces between the other tissues. It gives the breast its size.  Fibrocystic breasts  Hormones are chemicals that control your menstrual cycle. Hormones can also make glandular tissue swell. This stretches fibrous tissue, making your breasts feel sore. Hormones may also cause one or more fluid-filled lumps to form. These lumps are called cysts. They may be large or small. Cysts are not cancerous. This means they are benign. Just before your period, cysts may become larger. Your breasts may feel more sore.  What you may feel  Changes in hormone levels during your menstrual cycle affect your breasts. If you have fibrocystic breasts, your breasts may become sore or even painful. This can often happen before your period. Your breasts may also swell or feel lumpier at this time.  Caring for your breasts  Breast self-awareness is the key to caring for your breasts. Self-awareness means knowing how your breasts normally look and feel. This helps you notice any changes right away. Call your provider if you notice new lumps or changes that feel different than normal. See your provider for regular exams. And have a mammogram as often as your  provider suggests.   Date Last Reviewed: 8/13/2015  © 4660-2596 [employer]. [address] [place] [address], [place] [address]. All rights reserved. This information is not intended as a substitute for professional medical care. Always follow your healthcare professional's instructions.

## 2019-11-06 NOTE — PROGRESS NOTES
Spoke wit pt  re scheduling appt with  for HTN. Appt scheduled 11/13/19. Instructed pt on appt. Pt verbalized understanding.

## 2019-11-13 ENCOUNTER — LAB VISIT (OUTPATIENT)
Dept: LAB | Facility: HOSPITAL | Age: 49
End: 2019-11-13
Attending: FAMILY MEDICINE
Payer: COMMERCIAL

## 2019-11-13 ENCOUNTER — LAB VISIT (OUTPATIENT)
Dept: LAB | Facility: HOSPITAL | Age: 49
End: 2019-11-13
Payer: COMMERCIAL

## 2019-11-13 ENCOUNTER — OFFICE VISIT (OUTPATIENT)
Dept: INTERNAL MEDICINE | Facility: CLINIC | Age: 49
End: 2019-11-13
Payer: COMMERCIAL

## 2019-11-13 ENCOUNTER — CLINICAL SUPPORT (OUTPATIENT)
Dept: CARDIOLOGY | Facility: CLINIC | Age: 49
End: 2019-11-13
Payer: COMMERCIAL

## 2019-11-13 VITALS
RESPIRATION RATE: 16 BRPM | OXYGEN SATURATION: 99 % | TEMPERATURE: 98 F | WEIGHT: 170.88 LBS | HEART RATE: 74 BPM | DIASTOLIC BLOOD PRESSURE: 104 MMHG | SYSTOLIC BLOOD PRESSURE: 136 MMHG | BODY MASS INDEX: 29.17 KG/M2 | HEIGHT: 64 IN

## 2019-11-13 DIAGNOSIS — Z29.9 PREVENTIVE MEASURE: ICD-10-CM

## 2019-11-13 DIAGNOSIS — D50.0 IRON DEFICIENCY ANEMIA DUE TO CHRONIC BLOOD LOSS: ICD-10-CM

## 2019-11-13 DIAGNOSIS — I10 ESSENTIAL HYPERTENSION: ICD-10-CM

## 2019-11-13 DIAGNOSIS — I16.0 HYPERTENSIVE URGENCY: ICD-10-CM

## 2019-11-13 DIAGNOSIS — E55.9 VITAMIN D DEFICIENCY: ICD-10-CM

## 2019-11-13 DIAGNOSIS — I16.0 HYPERTENSIVE URGENCY: Primary | ICD-10-CM

## 2019-11-13 LAB
25(OH)D3+25(OH)D2 SERPL-MCNC: 15 NG/ML (ref 30–96)
ALBUMIN SERPL BCP-MCNC: 3.5 G/DL (ref 3.5–5.2)
ALP SERPL-CCNC: 81 U/L (ref 55–135)
ALT SERPL W/O P-5'-P-CCNC: 9 U/L (ref 10–44)
ANION GAP SERPL CALC-SCNC: 5 MMOL/L (ref 8–16)
AST SERPL-CCNC: 18 U/L (ref 10–40)
BASOPHILS # BLD AUTO: 0.05 K/UL (ref 0–0.2)
BASOPHILS NFR BLD: 1.2 % (ref 0–1.9)
BILIRUB SERPL-MCNC: 0.5 MG/DL (ref 0.1–1)
BILIRUB UR QL STRIP: NEGATIVE
BUN SERPL-MCNC: 9 MG/DL (ref 6–20)
CALCIUM SERPL-MCNC: 9.2 MG/DL (ref 8.7–10.5)
CHLORIDE SERPL-SCNC: 105 MMOL/L (ref 95–110)
CHOLEST SERPL-MCNC: 236 MG/DL (ref 120–199)
CHOLEST/HDLC SERPL: 3.5 {RATIO} (ref 2–5)
CLARITY UR: CLEAR
CO2 SERPL-SCNC: 29 MMOL/L (ref 23–29)
COLOR UR: NORMAL
CREAT SERPL-MCNC: 0.8 MG/DL (ref 0.5–1.4)
DIFFERENTIAL METHOD: ABNORMAL
EOSINOPHIL # BLD AUTO: 0 K/UL (ref 0–0.5)
EOSINOPHIL NFR BLD: 0.9 % (ref 0–8)
ERYTHROCYTE [DISTWIDTH] IN BLOOD BY AUTOMATED COUNT: 16.5 % (ref 11.5–14.5)
EST. GFR  (AFRICAN AMERICAN): >60 ML/MIN/1.73 M^2
EST. GFR  (NON AFRICAN AMERICAN): >60 ML/MIN/1.73 M^2
FERRITIN SERPL-MCNC: 5 NG/ML (ref 20–300)
GLUCOSE SERPL-MCNC: 77 MG/DL (ref 70–110)
GLUCOSE UR QL STRIP: NEGATIVE
HCT VFR BLD AUTO: 35.7 % (ref 37–48.5)
HDLC SERPL-MCNC: 68 MG/DL (ref 40–75)
HDLC SERPL: 28.8 % (ref 20–50)
HGB BLD-MCNC: 10.6 G/DL (ref 12–16)
HGB UR QL STRIP: NEGATIVE
IMM GRANULOCYTES # BLD AUTO: 0.01 K/UL (ref 0–0.04)
IMM GRANULOCYTES NFR BLD AUTO: 0.2 % (ref 0–0.5)
KETONES UR QL STRIP: NEGATIVE
LDLC SERPL CALC-MCNC: 154.2 MG/DL (ref 63–159)
LEUKOCYTE ESTERASE UR QL STRIP: NEGATIVE
LYMPHOCYTES # BLD AUTO: 1.2 K/UL (ref 1–4.8)
LYMPHOCYTES NFR BLD: 29 % (ref 18–48)
MCH RBC QN AUTO: 23.5 PG (ref 27–31)
MCHC RBC AUTO-ENTMCNC: 29.7 G/DL (ref 32–36)
MCV RBC AUTO: 79 FL (ref 82–98)
MONOCYTES # BLD AUTO: 0.4 K/UL (ref 0.3–1)
MONOCYTES NFR BLD: 8.9 % (ref 4–15)
NEUTROPHILS # BLD AUTO: 2.6 K/UL (ref 1.8–7.7)
NEUTROPHILS NFR BLD: 59.8 % (ref 38–73)
NITRITE UR QL STRIP: NEGATIVE
NONHDLC SERPL-MCNC: 168 MG/DL
NRBC BLD-RTO: 0 /100 WBC
PH UR STRIP: 6 [PH] (ref 5–8)
PLATELET # BLD AUTO: 502 K/UL (ref 150–350)
PMV BLD AUTO: 9.5 FL (ref 9.2–12.9)
POTASSIUM SERPL-SCNC: 4 MMOL/L (ref 3.5–5.1)
PROT SERPL-MCNC: 7.9 G/DL (ref 6–8.4)
PROT UR QL STRIP: NEGATIVE
RBC # BLD AUTO: 4.52 M/UL (ref 4–5.4)
SODIUM SERPL-SCNC: 139 MMOL/L (ref 136–145)
SP GR UR STRIP: 1.01 (ref 1–1.03)
TRIGL SERPL-MCNC: 69 MG/DL (ref 30–150)
TSH SERPL DL<=0.005 MIU/L-ACNC: 0.68 UIU/ML (ref 0.4–4)
URN SPEC COLLECT METH UR: NORMAL
WBC # BLD AUTO: 4.27 K/UL (ref 3.9–12.7)

## 2019-11-13 PROCEDURE — 85025 COMPLETE CBC W/AUTO DIFF WBC: CPT

## 2019-11-13 PROCEDURE — 82728 ASSAY OF FERRITIN: CPT

## 2019-11-13 PROCEDURE — 3080F DIAST BP >= 90 MM HG: CPT | Mod: CPTII,S$GLB,, | Performed by: FAMILY MEDICINE

## 2019-11-13 PROCEDURE — 3008F BODY MASS INDEX DOCD: CPT | Mod: CPTII,S$GLB,, | Performed by: FAMILY MEDICINE

## 2019-11-13 PROCEDURE — 80053 COMPREHEN METABOLIC PANEL: CPT

## 2019-11-13 PROCEDURE — 84443 ASSAY THYROID STIM HORMONE: CPT

## 2019-11-13 PROCEDURE — 82306 VITAMIN D 25 HYDROXY: CPT

## 2019-11-13 PROCEDURE — 99214 PR OFFICE/OUTPT VISIT, EST, LEVL IV, 30-39 MIN: ICD-10-PCS | Mod: S$GLB,,, | Performed by: FAMILY MEDICINE

## 2019-11-13 PROCEDURE — 93005 EKG 12-LEAD: ICD-10-PCS | Mod: S$GLB,,, | Performed by: FAMILY MEDICINE

## 2019-11-13 PROCEDURE — 3080F PR MOST RECENT DIASTOLIC BLOOD PRESSURE >= 90 MM HG: ICD-10-PCS | Mod: CPTII,S$GLB,, | Performed by: FAMILY MEDICINE

## 2019-11-13 PROCEDURE — 81003 URINALYSIS AUTO W/O SCOPE: CPT

## 2019-11-13 PROCEDURE — 3008F PR BODY MASS INDEX (BMI) DOCUMENTED: ICD-10-PCS | Mod: CPTII,S$GLB,, | Performed by: FAMILY MEDICINE

## 2019-11-13 PROCEDURE — 3075F SYST BP GE 130 - 139MM HG: CPT | Mod: CPTII,S$GLB,, | Performed by: FAMILY MEDICINE

## 2019-11-13 PROCEDURE — 99214 OFFICE O/P EST MOD 30 MIN: CPT | Mod: S$GLB,,, | Performed by: FAMILY MEDICINE

## 2019-11-13 PROCEDURE — 99999 PR PBB SHADOW E&M-EST. PATIENT-LVL III: CPT | Mod: PBBFAC,,, | Performed by: FAMILY MEDICINE

## 2019-11-13 PROCEDURE — 93005 ELECTROCARDIOGRAM TRACING: CPT | Mod: S$GLB,,, | Performed by: FAMILY MEDICINE

## 2019-11-13 PROCEDURE — 93010 ELECTROCARDIOGRAM REPORT: CPT | Mod: S$GLB,,, | Performed by: INTERNAL MEDICINE

## 2019-11-13 PROCEDURE — 99999 PR PBB SHADOW E&M-EST. PATIENT-LVL III: ICD-10-PCS | Mod: PBBFAC,,, | Performed by: FAMILY MEDICINE

## 2019-11-13 PROCEDURE — 3075F PR MOST RECENT SYSTOLIC BLOOD PRESS GE 130-139MM HG: ICD-10-PCS | Mod: CPTII,S$GLB,, | Performed by: FAMILY MEDICINE

## 2019-11-13 PROCEDURE — 93010 EKG 12-LEAD: ICD-10-PCS | Mod: S$GLB,,, | Performed by: INTERNAL MEDICINE

## 2019-11-13 PROCEDURE — 36415 COLL VENOUS BLD VENIPUNCTURE: CPT

## 2019-11-13 PROCEDURE — 80061 LIPID PANEL: CPT

## 2019-11-13 RX ORDER — LOSARTAN POTASSIUM 100 MG/1
100 TABLET ORAL DAILY
Qty: 90 TABLET | Refills: 3 | Status: SHIPPED | OUTPATIENT
Start: 2019-11-13 | End: 2021-04-26 | Stop reason: ALTCHOICE

## 2019-11-13 RX ORDER — HYDROCHLOROTHIAZIDE 25 MG/1
25 TABLET ORAL DAILY
Qty: 90 TABLET | Refills: 3 | Status: SHIPPED | OUTPATIENT
Start: 2019-11-13 | End: 2021-04-26 | Stop reason: ALTCHOICE

## 2019-11-13 NOTE — PROGRESS NOTES
"Subjective:       Patient ID: Dana Winter is a 49 y.o. female.    Chief Complaint: Annual Exam    49-year-old  female patient with Patient Active Problem List:     Essential hypertension     DJD (degenerative joint disease) of cervical spine     Overweight (BMI 25.0-29.9)     Iron deficiency anemia due to chronic blood loss     Vitamin D deficiency  Here for follow-up on blood pressure and requesting routine annual physicals.  Patient reported that she went to her OBGYN last week and was told that she has elevated blood pressure, has been taking losartan 50 mg hydrochlorothiazide 12.5 mg regularly and took it this morning.  Denies any chest pain or difficulty breathing, headache nausea vomiting, blurry vision.   Has been having regular menstrual cycles, reports last menstrual cycle a week ago  Denies any extreme tiredness or dizziness      Review of Systems   Constitutional: Negative for fatigue.   Eyes: Negative for visual disturbance.   Respiratory: Negative for chest tightness and shortness of breath.    Cardiovascular: Negative for chest pain, palpitations and leg swelling.   Gastrointestinal: Negative for abdominal pain, nausea and vomiting.   Genitourinary: Negative for menstrual problem.   Musculoskeletal: Negative for myalgias.   Skin: Negative for rash.   Neurological: Negative for weakness, light-headedness, numbness and headaches.   Psychiatric/Behavioral: Negative for sleep disturbance. The patient is not nervous/anxious.          BP (!) 136/104 (BP Location: Right arm, Patient Position: Sitting, BP Method: Large (Manual))   Pulse 74   Temp 98.1 °F (36.7 °C) (Tympanic)   Resp 16   Ht 5' 4" (1.626 m)   Wt 77.5 kg (170 lb 13.7 oz)   LMP 11/05/2019   SpO2 99%   BMI 29.33 kg/m²   Objective:      Physical Exam   Constitutional: She is oriented to person, place, and time. She appears well-developed and well-nourished.   HENT:   Head: Normocephalic and atraumatic.   Mouth/Throat: " Oropharynx is clear and moist.   Cardiovascular: Normal rate, regular rhythm and normal heart sounds.   No murmur heard.  Pulmonary/Chest: Effort normal and breath sounds normal. She has no wheezes. She exhibits no tenderness.   Abdominal: Soft. Bowel sounds are normal. There is no tenderness.   Musculoskeletal: She exhibits no edema.   Neurological: She is alert and oriented to person, place, and time. No cranial nerve deficit.   Skin: Skin is warm and dry. No rash noted.   Psychiatric: She has a normal mood and affect.         Assessment/Plan:   1. Hypertensive urgency  - EKG 12-lead; Future  2. Essential hypertension  - losartan (COZAAR) 100 MG tablet; Take 1 tablet (100 mg total) by mouth once daily.  Dispense: 90 tablet; Refill: 3  - hydroCHLOROthiazide (HYDRODIURIL) 25 MG tablet; Take 1 tablet (25 mg total) by mouth once daily.  Dispense: 90 tablet; Refill: 3  - [facility] Enroll[facility]  - Hypertension Digital Medicine (HDMP): Assign Onboarding Questionnaires  Noted extremely elevated blood pressure but patient clinically asymptomatic  Will increase losartan from  mg daily and hydrochlorothiazide from 12.5-25 mg daily.   Patient was encouraged to take extra dose of losartan 50 mg and hydrochlorothiazide 12.5 mg today.   Will enroll in hypertension digital program  Follow-up in 1 week  Restrict salt intake and eat low-fat and low-cholesterol diet and exercise 30 min daily  If any worsening advised to go to ER immediately    3. Iron deficiency anemia due to chronic blood loss  - CBC auto differential; Future  - Ferritin; Future  Currently not taking iron supplements  Will recheck labs    4. Vitamin D deficiency  - Vitamin D; Future  Will recheck vitamin-D levels as it was low in the past    5. Preventive measure  - CBC auto differential; Future  - Comprehensive metabolic panel; Future  - Lipid panel; Future  - TSH; Future  - Urinalysis; Future   Follow-up in 1 week    Denies  any worsening anxiety and advised to follow up with gynecologist secondary to minimal right breast pain for which patient is scheduled for diagnostic mammogram and ultrasound.

## 2019-11-14 DIAGNOSIS — E55.9 VITAMIN D DEFICIENCY: Primary | ICD-10-CM

## 2019-11-14 DIAGNOSIS — D50.0 IRON DEFICIENCY ANEMIA DUE TO CHRONIC BLOOD LOSS: ICD-10-CM

## 2019-11-14 RX ORDER — ERGOCALCIFEROL 1.25 MG/1
50000 CAPSULE ORAL
Qty: 10 CAPSULE | Refills: 0 | Status: SHIPPED | OUTPATIENT
Start: 2019-11-14 | End: 2020-07-26

## 2019-11-14 RX ORDER — FERROUS SULFATE 325(65) MG
325 TABLET ORAL 2 TIMES DAILY
Qty: 60 TABLET | Refills: 5 | Status: SHIPPED | OUTPATIENT
Start: 2019-11-14 | End: 2021-06-08 | Stop reason: SDUPTHER

## 2019-11-15 ENCOUNTER — HOSPITAL ENCOUNTER (OUTPATIENT)
Dept: RADIOLOGY | Facility: HOSPITAL | Age: 49
Discharge: HOME | End: 2019-11-15
Attending: NURSE PRACTITIONER
Payer: COMMERCIAL

## 2019-11-15 VITALS — WEIGHT: 170.88 LBS | HEIGHT: 64 IN | BODY MASS INDEX: 29.17 KG/M2

## 2019-11-15 DIAGNOSIS — N63.10 BREAST MASS, RIGHT: ICD-10-CM

## 2019-11-15 DIAGNOSIS — N64.4 BREAST PAIN, RIGHT: ICD-10-CM

## 2019-11-15 PROCEDURE — 77061 MAMMO DIGITAL DIAGNOSTIC RIGHT WITH TOMOSYNTHESIS_CAD: ICD-10-PCS | Mod: 26,,, | Performed by: RADIOLOGY

## 2019-11-15 PROCEDURE — 77065 DX MAMMO INCL CAD UNI: CPT | Mod: TC

## 2019-11-15 PROCEDURE — 77061 BREAST TOMOSYNTHESIS UNI: CPT | Mod: 26,,, | Performed by: RADIOLOGY

## 2019-11-15 PROCEDURE — 77061 BREAST TOMOSYNTHESIS UNI: CPT | Mod: TC

## 2019-11-15 PROCEDURE — 77065 DX MAMMO INCL CAD UNI: CPT | Mod: 26,,, | Performed by: RADIOLOGY

## 2019-11-15 PROCEDURE — 77065 MAMMO DIGITAL DIAGNOSTIC RIGHT WITH TOMOSYNTHESIS_CAD: ICD-10-PCS | Mod: 26,,, | Performed by: RADIOLOGY

## 2020-03-24 ENCOUNTER — TELEPHONE (OUTPATIENT)
Dept: OBSTETRICS AND GYNECOLOGY | Facility: CLINIC | Age: 50
End: 2020-03-24

## 2020-03-24 NOTE — TELEPHONE ENCOUNTER
----- Message from Michelle Fortune sent at 3/24/2020 10:29 AM CDT -----  Contact: self [phone]  Pt would like return call from nurse regarding question about irregurlar bleeding.  Please call back at [phone]. Md Joshua

## 2020-03-24 NOTE — TELEPHONE ENCOUNTER
Returned call to patient.  She c/o irregular bleeding since appendectomy on 03/20/20 at [facility].  Encouraged patient to follow-up with [facility] regarding her complaints of irregular bleeding, and also made her aware that she should also follow-up for her post-op visit with [facility], she verbalized understanding and says that she will contact [facility].

## 2020-03-30 ENCOUNTER — TELEPHONE (OUTPATIENT)
Dept: INTERNAL MEDICINE | Facility: CLINIC | Age: 50
End: 2020-03-30

## 2020-03-30 NOTE — TELEPHONE ENCOUNTER
Pt denies any s/s of infection. Pt states incision site looks normal steri strips are intact. No fever present. Pt is advised to keep an eye on the incision and if there are any s/s of infection to call us.

## 2020-03-30 NOTE — TELEPHONE ENCOUNTER
----- Message from Anna Lyle sent at 3/30/2020  2:11 PM CDT -----  Contact: pt  Pt needs a follow up appt for appendix removal last Friday.

## 2020-06-09 ENCOUNTER — LAB VISIT (OUTPATIENT)
Dept: INTERNAL MEDICINE | Facility: CLINIC | Age: 50
End: 2020-06-09
Payer: MEDICAID

## 2020-06-09 DIAGNOSIS — Z20.822 SUSPECTED COVID-19 VIRUS INFECTION: Primary | ICD-10-CM

## 2020-06-09 DIAGNOSIS — Z20.822 SUSPECTED COVID-19 VIRUS INFECTION: ICD-10-CM

## 2020-06-09 PROCEDURE — U0003 INFECTIOUS AGENT DETECTION BY NUCLEIC ACID (DNA OR RNA); SEVERE ACUTE RESPIRATORY SYNDROME CORONAVIRUS 2 (SARS-COV-2) (CORONAVIRUS DISEASE [COVID-19]), AMPLIFIED PROBE TECHNIQUE, MAKING USE OF HIGH THROUGHPUT TECHNOLOGIES AS DESCRIBED BY CMS-2020-01-R: HCPCS

## 2020-06-10 DIAGNOSIS — U07.1 COVID-19 VIRUS DETECTED: ICD-10-CM

## 2020-06-10 LAB — SARS-COV-2 RNA RESP QL NAA+PROBE: DETECTED

## 2020-06-11 ENCOUNTER — TELEPHONE (OUTPATIENT)
Dept: INTERNAL MEDICINE | Facility: CLINIC | Age: 50
End: 2020-06-11

## 2020-06-11 ENCOUNTER — NURSE TRIAGE (OUTPATIENT)
Dept: ADMINISTRATIVE | Facility: CLINIC | Age: 50
End: 2020-06-11

## 2020-06-11 DIAGNOSIS — U07.1 REAL TIME REVERSE TRANSCRIPTASE PCR POSITIVE FOR COVID-19 VIRUS: Primary | ICD-10-CM

## 2020-06-11 RX ORDER — PROMETHAZINE HYDROCHLORIDE AND DEXTROMETHORPHAN HYDROBROMIDE 6.25; 15 MG/5ML; MG/5ML
5 SYRUP ORAL NIGHTLY PRN
Qty: 118 ML | Refills: 0 | Status: SHIPPED | OUTPATIENT
Start: 2020-06-11 | End: 2020-06-21

## 2020-06-11 NOTE — TELEPHONE ENCOUNTER
"Tested + for COVID. Coughs and feels SOB. Pt reports episode of severe difficulty breathing. She states she feels like her chest is "pumping harder than usual". Pt advised to call 911 per protocol. Pt accepted dispo. Pt instructed to let EMS know she is COVID Pt. VU. t advised to call back with any questions, concerns, or new/worsening symptoms. Pt VU.     Reason for Disposition   SEVERE difficulty breathing (e.g., struggling for each breath, speaks in single words)    Protocols used: CORONAVIRUS (COVID-19) DIAGNOSED OR [id number]      "

## 2020-06-11 NOTE — TELEPHONE ENCOUNTER
Pt called requesting medication for cough due to pt being detected for Covid 19. Informed pt that a message will be sent to provider due to provider being out of clinic today./Tran

## 2020-06-11 NOTE — TELEPHONE ENCOUNTER
Promethazine DM cough syrup has been sent to her pharmacy . Patient will be enrolled in COVID-19 home monitoring program . If having any worsening shortness of breath , please advise to go to ER

## 2020-06-25 ENCOUNTER — TELEPHONE (OUTPATIENT)
Dept: INTERNAL MEDICINE | Facility: CLINIC | Age: 50
End: 2020-06-25

## 2020-06-30 ENCOUNTER — HOSPITAL ENCOUNTER (OUTPATIENT)
Dept: RADIOLOGY | Facility: HOSPITAL | Age: 50
Discharge: HOME | End: 2020-06-30
Attending: FAMILY MEDICINE
Payer: MEDICAID

## 2020-06-30 ENCOUNTER — OFFICE VISIT (OUTPATIENT)
Dept: INTERNAL MEDICINE | Facility: CLINIC | Age: 50
End: 2020-06-30
Payer: MEDICAID

## 2020-06-30 ENCOUNTER — TELEPHONE (OUTPATIENT)
Dept: INTERNAL MEDICINE | Facility: CLINIC | Age: 50
End: 2020-06-30

## 2020-06-30 VITALS
WEIGHT: 176.13 LBS | DIASTOLIC BLOOD PRESSURE: 76 MMHG | HEIGHT: 64 IN | SYSTOLIC BLOOD PRESSURE: 110 MMHG | OXYGEN SATURATION: 97 % | BODY MASS INDEX: 30.07 KG/M2 | HEART RATE: 91 BPM | RESPIRATION RATE: 16 BRPM

## 2020-06-30 DIAGNOSIS — I10 ESSENTIAL HYPERTENSION: ICD-10-CM

## 2020-06-30 DIAGNOSIS — E66.9 OBESITY (BMI 30.0-34.9): ICD-10-CM

## 2020-06-30 DIAGNOSIS — E55.9 VITAMIN D DEFICIENCY: ICD-10-CM

## 2020-06-30 DIAGNOSIS — D50.0 IRON DEFICIENCY ANEMIA DUE TO CHRONIC BLOOD LOSS: ICD-10-CM

## 2020-06-30 DIAGNOSIS — Z00.00 ROUTINE GENERAL MEDICAL EXAMINATION AT A HEALTH CARE FACILITY: Primary | ICD-10-CM

## 2020-06-30 DIAGNOSIS — U07.1 REAL TIME REVERSE TRANSCRIPTASE PCR POSITIVE FOR COVID-19 VIRUS: ICD-10-CM

## 2020-06-30 DIAGNOSIS — J12.82 PNEUMONIA DUE TO COVID-19 VIRUS: Primary | ICD-10-CM

## 2020-06-30 DIAGNOSIS — U07.1 PNEUMONIA DUE TO COVID-19 VIRUS: Primary | ICD-10-CM

## 2020-06-30 DIAGNOSIS — R05.9 COUGH: ICD-10-CM

## 2020-06-30 DIAGNOSIS — Z01.419 WELL WOMAN EXAM: ICD-10-CM

## 2020-06-30 PROBLEM — E66.811 OBESITY (BMI 30.0-34.9): Status: ACTIVE | Noted: 2020-06-30

## 2020-06-30 PROCEDURE — 99999 PR PBB SHADOW E&M-EST. PATIENT-LVL IV: CPT | Mod: PBBFAC,,, | Performed by: FAMILY MEDICINE

## 2020-06-30 PROCEDURE — 99214 OFFICE O/P EST MOD 30 MIN: CPT | Mod: PBBFAC,25 | Performed by: FAMILY MEDICINE

## 2020-06-30 PROCEDURE — 99999 PR PBB SHADOW E&M-EST. PATIENT-LVL IV: ICD-10-PCS | Mod: PBBFAC,,, | Performed by: FAMILY MEDICINE

## 2020-06-30 PROCEDURE — 99396 PREV VISIT EST AGE 40-64: CPT | Mod: S$PBB,,, | Performed by: FAMILY MEDICINE

## 2020-06-30 PROCEDURE — 71046 X-RAY EXAM CHEST 2 VIEWS: CPT | Mod: TC

## 2020-06-30 PROCEDURE — 71046 XR CHEST PA AND LATERAL: ICD-10-PCS | Mod: 26,,, | Performed by: RADIOLOGY

## 2020-06-30 PROCEDURE — 99396 PR PREVENTIVE VISIT,EST,40-64: ICD-10-PCS | Mod: S$PBB,,, | Performed by: FAMILY MEDICINE

## 2020-06-30 PROCEDURE — 71046 X-RAY EXAM CHEST 2 VIEWS: CPT | Mod: 26,,, | Performed by: RADIOLOGY

## 2020-06-30 RX ORDER — PROMETHAZINE HYDROCHLORIDE AND DEXTROMETHORPHAN HYDROBROMIDE 6.25; 15 MG/5ML; MG/5ML
5 SYRUP ORAL NIGHTLY PRN
Qty: 118 ML | Refills: 0 | Status: SHIPPED | OUTPATIENT
Start: 2020-06-30 | End: 2020-07-10

## 2020-06-30 RX ORDER — AZITHROMYCIN 250 MG/1
TABLET, FILM COATED ORAL
Qty: 6 TABLET | Refills: 0 | Status: SHIPPED | OUTPATIENT
Start: 2020-06-30 | End: 2020-07-05

## 2020-06-30 NOTE — TELEPHONE ENCOUNTER
X-ray consistent with pneumonia, will send azithromycin to the pharmacy and cough medication.   Patient COVID positive recently on 06/09/20    If any worsening symptoms or persistent cough patient to go to ER  Noted normal oxygen levels during today's exam

## 2020-06-30 NOTE — PROGRESS NOTES
"Subjective:       Patient ID: Dana Winter is a 50 y.o. female.    Chief Complaint: Follow-up and Shortness of Breath    50-year-old  female patient with Patient Active Problem List:     Essential hypertension     DJD (degenerative joint disease) of cervical spine     Iron deficiency anemia due to chronic blood loss     Vitamin D deficiency     Obesity (BMI 30.0-34.9)  Here for routine annual physicals and reports that she was positive for COVID on 06/09/2020, has improvement in cough but has been having minimal cough for which she is currently not taking any medication, denies any fever with chills, reports minimal fatigue.   Just finished vitamin-D by prescription last pill  Denies any chest pain or difficulty breathing, abdominal discomfort nausea vomiting    Review of Systems   Constitutional: Positive for fatigue. Negative for chills and fever.   Eyes: Negative for visual disturbance.   Respiratory: Positive for cough. Negative for shortness of breath.    Cardiovascular: Negative for chest pain and leg swelling.   Gastrointestinal: Negative for abdominal pain, nausea and vomiting.   Musculoskeletal: Negative for myalgias.   Skin: Negative for rash.   Neurological: Negative for light-headedness and headaches.   Psychiatric/Behavioral: Negative for sleep disturbance.         /76 (BP Location: Right arm, Patient Position: Sitting, BP Method: Large (Manual))   Pulse 91   Resp 16   Ht 5' 4" (1.626 m)   Wt 79.9 kg (176 lb 2.4 oz)   SpO2 97%   BMI 30.24 kg/m²   Objective:      Physical Exam  Constitutional:       Appearance: She is well-developed.   HENT:      Head: Normocephalic and atraumatic.   Cardiovascular:      Rate and Rhythm: Normal rate and regular rhythm.      Heart sounds: Normal heart sounds. No murmur.   Pulmonary:      Effort: Pulmonary effort is normal. No respiratory distress.      Breath sounds: Normal breath sounds. No wheezing.   Abdominal:      General: Bowel " sounds are normal.      Palpations: Abdomen is soft.      Tenderness: There is no abdominal tenderness.   Skin:     General: Skin is warm and dry.      Findings: No rash.   Neurological:      Mental Status: She is alert and oriented to person, place, and time.           Assessment/Plan:   1. Real time reverse transcriptase PCR positive for COVID-19 virus  - CBC auto differential; Future  2. Cough  - CBC auto differential; Future  - X-Ray Chest PA And Lateral; Future    Patient symptoms clinically improved and is currently fever free and noted improvement in cough  Advised to take over-the-counter Mucinex DM for cough  Drink adequate fluids and okay to take Tylenol as needed for fatigue  Will plan to get chest x-ray secondary to persistent cough    3. Essential hypertension  - Comprehensive metabolic panel; Future  - TSH; Future  - Lipid Panel; Future  Blood pressure is stable today currently on losartan 100 mg and hydrochlorothiazide 25 mg daily    4. Vitamin D deficiency  - Vitamin D; Future  Currently finished vitamin-D by prescription weekly    5. Iron deficiency anemia due to chronic blood loss  - CBC auto differential; Future  - Iron and TIBC; Future  - Ferritin; Future  Currently on iron supplements twice daily    6. Obesity (BMI 30.0-34.9)  Lifestyle modifications recommended to lose weight with BMI 30    7. Routine general medical examination at a health care facility  - CBC auto differential; Future  - Comprehensive metabolic panel; Future  - TSH; Future  - Lipid Panel; Future  Vital signs stable today  Noted normal pulse ox  Continue lifestyle modifications with diet and exercise  Due for Pap smear    8. Well woman exam  - Ambulatory referral/consult to Gynecology; Future

## 2020-07-02 ENCOUNTER — TELEPHONE (OUTPATIENT)
Dept: INTERNAL MEDICINE | Facility: CLINIC | Age: 50
End: 2020-07-02

## 2020-07-02 ENCOUNTER — TELEPHONE (OUTPATIENT)
Dept: OBSTETRICS AND GYNECOLOGY | Facility: CLINIC | Age: 50
End: 2020-07-02

## 2020-07-02 ENCOUNTER — LAB VISIT (OUTPATIENT)
Dept: LAB | Facility: HOSPITAL | Age: 50
End: 2020-07-02
Attending: FAMILY MEDICINE
Payer: MEDICAID

## 2020-07-02 DIAGNOSIS — Z00.00 ROUTINE GENERAL MEDICAL EXAMINATION AT A HEALTH CARE FACILITY: ICD-10-CM

## 2020-07-02 DIAGNOSIS — D50.0 IRON DEFICIENCY ANEMIA DUE TO CHRONIC BLOOD LOSS: ICD-10-CM

## 2020-07-02 DIAGNOSIS — I10 ESSENTIAL HYPERTENSION: ICD-10-CM

## 2020-07-02 DIAGNOSIS — R05.9 COUGH: ICD-10-CM

## 2020-07-02 DIAGNOSIS — E55.9 VITAMIN D DEFICIENCY: ICD-10-CM

## 2020-07-02 DIAGNOSIS — U07.1 PNEUMONIA DUE TO COVID-19 VIRUS: Primary | ICD-10-CM

## 2020-07-02 DIAGNOSIS — J12.82 PNEUMONIA DUE TO COVID-19 VIRUS: Primary | ICD-10-CM

## 2020-07-02 DIAGNOSIS — U07.1 REAL TIME REVERSE TRANSCRIPTASE PCR POSITIVE FOR COVID-19 VIRUS: ICD-10-CM

## 2020-07-02 LAB
25(OH)D3+25(OH)D2 SERPL-MCNC: 21 NG/ML (ref 30–96)
ALBUMIN SERPL BCP-MCNC: 3.1 G/DL (ref 3.5–5.2)
ALP SERPL-CCNC: 78 U/L (ref 55–135)
ALT SERPL W/O P-5'-P-CCNC: 12 U/L (ref 10–44)
ANION GAP SERPL CALC-SCNC: 9 MMOL/L (ref 8–16)
AST SERPL-CCNC: 18 U/L (ref 10–40)
BASOPHILS # BLD AUTO: 0.06 K/UL (ref 0–0.2)
BASOPHILS NFR BLD: 1 % (ref 0–1.9)
BILIRUB SERPL-MCNC: 0.4 MG/DL (ref 0.1–1)
BUN SERPL-MCNC: 14 MG/DL (ref 6–20)
CALCIUM SERPL-MCNC: 8.9 MG/DL (ref 8.7–10.5)
CHLORIDE SERPL-SCNC: 104 MMOL/L (ref 95–110)
CHOLEST SERPL-MCNC: 226 MG/DL (ref 120–199)
CHOLEST/HDLC SERPL: 4.7 {RATIO} (ref 2–5)
CO2 SERPL-SCNC: 29 MMOL/L (ref 23–29)
CREAT SERPL-MCNC: 0.9 MG/DL (ref 0.5–1.4)
DIFFERENTIAL METHOD: ABNORMAL
EOSINOPHIL # BLD AUTO: 0.2 K/UL (ref 0–0.5)
EOSINOPHIL NFR BLD: 3.1 % (ref 0–8)
ERYTHROCYTE [DISTWIDTH] IN BLOOD BY AUTOMATED COUNT: 16 % (ref 11.5–14.5)
EST. GFR  (AFRICAN AMERICAN): >60 ML/MIN/1.73 M^2
EST. GFR  (NON AFRICAN AMERICAN): >60 ML/MIN/1.73 M^2
FERRITIN SERPL-MCNC: 38 NG/ML (ref 20–300)
GLUCOSE SERPL-MCNC: 82 MG/DL (ref 70–110)
HCT VFR BLD AUTO: 34.4 % (ref 37–48.5)
HDLC SERPL-MCNC: 48 MG/DL (ref 40–75)
HDLC SERPL: 21.2 % (ref 20–50)
HGB BLD-MCNC: 10.5 G/DL (ref 12–16)
IMM GRANULOCYTES # BLD AUTO: 0.01 K/UL (ref 0–0.04)
IMM GRANULOCYTES NFR BLD AUTO: 0.2 % (ref 0–0.5)
IRON SERPL-MCNC: 48 UG/DL (ref 30–160)
LDLC SERPL CALC-MCNC: 154.8 MG/DL (ref 63–159)
LYMPHOCYTES # BLD AUTO: 2 K/UL (ref 1–4.8)
LYMPHOCYTES NFR BLD: 31.7 % (ref 18–48)
MCH RBC QN AUTO: 25.5 PG (ref 27–31)
MCHC RBC AUTO-ENTMCNC: 30.5 G/DL (ref 32–36)
MCV RBC AUTO: 84 FL (ref 82–98)
MONOCYTES # BLD AUTO: 0.6 K/UL (ref 0.3–1)
MONOCYTES NFR BLD: 10.2 % (ref 4–15)
NEUTROPHILS # BLD AUTO: 3.3 K/UL (ref 1.8–7.7)
NEUTROPHILS NFR BLD: 53.8 % (ref 38–73)
NONHDLC SERPL-MCNC: 178 MG/DL
NRBC BLD-RTO: 0 /100 WBC
PLATELET # BLD AUTO: 473 K/UL (ref 150–350)
PMV BLD AUTO: 9.3 FL (ref 9.2–12.9)
POTASSIUM SERPL-SCNC: 3.8 MMOL/L (ref 3.5–5.1)
PROT SERPL-MCNC: 7.3 G/DL (ref 6–8.4)
RBC # BLD AUTO: 4.12 M/UL (ref 4–5.4)
SATURATED IRON: 13 % (ref 20–50)
SODIUM SERPL-SCNC: 142 MMOL/L (ref 136–145)
TOTAL IRON BINDING CAPACITY: 382 UG/DL (ref 250–450)
TRANSFERRIN SERPL-MCNC: 258 MG/DL (ref 200–375)
TRIGL SERPL-MCNC: 116 MG/DL (ref 30–150)
TSH SERPL DL<=0.005 MIU/L-ACNC: 1.32 UIU/ML (ref 0.4–4)
WBC # BLD AUTO: 6.16 K/UL (ref 3.9–12.7)

## 2020-07-02 PROCEDURE — 84443 ASSAY THYROID STIM HORMONE: CPT

## 2020-07-02 PROCEDURE — 83540 ASSAY OF IRON: CPT

## 2020-07-02 PROCEDURE — 80061 LIPID PANEL: CPT

## 2020-07-02 PROCEDURE — 82306 VITAMIN D 25 HYDROXY: CPT

## 2020-07-02 PROCEDURE — 85025 COMPLETE CBC W/AUTO DIFF WBC: CPT

## 2020-07-02 PROCEDURE — 82728 ASSAY OF FERRITIN: CPT

## 2020-07-02 PROCEDURE — 80053 COMPREHEN METABOLIC PANEL: CPT

## 2020-07-02 PROCEDURE — 36415 COLL VENOUS BLD VENIPUNCTURE: CPT

## 2020-07-02 NOTE — TELEPHONE ENCOUNTER
----- Message from Susanna Zimmerman sent at 7/2/2020  8:22 AM CDT -----  Regarding: appt  Type:  Sooner Apoointment Request    Caller is requesting a sooner appointment.  Caller declined first available appointment listed below.  Caller will not accept being placed on the waitlist and is requesting a message be sent to doctor.  Name of Caller:pt  When is the first available appointment?n/a  Symptoms:annual  Would the patient rather a call back or a response via [facility]? Call back   Best Call Back Number:[phone]  Additional Information: Please call back

## 2020-07-02 NOTE — TELEPHONE ENCOUNTER
Let us plan to repeat chest x-ray on 07/10/2020, and if negative then we can have the patient go to work on 07/13/2020.  Patient does not need an appointment on 07/10/2020

## 2020-07-02 NOTE — TELEPHONE ENCOUNTER
Spoke with pt. Pt dx with Pneumonia 06/30/20. She is scheduled for work 07/04/20. Pt would like an excuse stating when she can return to work. Please advise

## 2020-07-02 NOTE — TELEPHONE ENCOUNTER
Spoke with pt. Informed pt of Dr. Glass's recommendation to repeat chest x-ray on 07/10/20. Scheduled pt for 07/10/2020 at 10 am for chest x-ray. Pt verbalized understanding. Pt was informed she can  work excuse here at the [employer] on the 2nd floor. Pt was thankful for the call back.

## 2020-07-02 NOTE — TELEPHONE ENCOUNTER
----- Message from Susanna Zimmerman sent at 7/2/2020  8:20 AM CDT -----  Regarding: work  Type:  Needs Medical Advice    Who Called: pt  Symptoms (please be specific): n/a   How long has patient had these symptoms:  n/a  Pharmacy name and phone #:  n/a  Would the patient rather a call back or a response via [facility]? Call back   Best Call Back Number: [phone]  Additional Information: Pt states she is scheduled to go back to work 07/04/2020, she was recently diagnosed. Please call back

## 2020-07-06 ENCOUNTER — TELEPHONE (OUTPATIENT)
Dept: INTERNAL MEDICINE | Facility: CLINIC | Age: 50
End: 2020-07-06

## 2020-07-06 NOTE — TELEPHONE ENCOUNTER
----- Message from Janae Freeman sent at 7/6/2020  4:48 PM CDT -----  Regarding: PT  Contact: PT  PT called to get a copy of her positive covid results so she can send it to her employer. Please call back     Callback: [phone]

## 2020-07-07 ENCOUNTER — TELEPHONE (OUTPATIENT)
Dept: INTERNAL MEDICINE | Facility: CLINIC | Age: 50
End: 2020-07-07

## 2020-07-07 NOTE — TELEPHONE ENCOUNTER
----- Message from Bryan Erazo sent at 7/7/2020 11:53 AM CDT -----  Regarding: Pt advice  The patient is calling in regards to getting documentation to give to her employer stating that she is currently COVID-19 positive  and cleared to return back to work , please advise as soon as possible ph#[phone] (home)

## 2020-07-07 NOTE — TELEPHONE ENCOUNTER
----- Message from Mary Michelle sent at 7/7/2020  3:56 PM CDT -----  Contact: pt  Type:  Patient Returning Call    Who Called:Dana  Who Left Message for Patient:  Does the patient know what this is regarding?:yes  Would the patient rather a call back or a response via [facility]? call  Best Call Back Number:[phone]  Additional Information:

## 2020-07-10 ENCOUNTER — HOSPITAL ENCOUNTER (OUTPATIENT)
Dept: RADIOLOGY | Facility: HOSPITAL | Age: 50
Discharge: HOME | End: 2020-07-10
Attending: FAMILY MEDICINE
Payer: MEDICAID

## 2020-07-10 DIAGNOSIS — J12.82 PNEUMONIA DUE TO COVID-19 VIRUS: ICD-10-CM

## 2020-07-10 DIAGNOSIS — U07.1 PNEUMONIA DUE TO COVID-19 VIRUS: ICD-10-CM

## 2020-07-10 PROCEDURE — 71046 XR CHEST PA AND LATERAL: ICD-10-PCS | Mod: 26,,, | Performed by: RADIOLOGY

## 2020-07-10 PROCEDURE — 71046 X-RAY EXAM CHEST 2 VIEWS: CPT | Mod: 26,,, | Performed by: RADIOLOGY

## 2020-07-10 PROCEDURE — 71046 X-RAY EXAM CHEST 2 VIEWS: CPT | Mod: TC

## 2020-08-04 ENCOUNTER — TELEPHONE (OUTPATIENT)
Dept: OBSTETRICS AND GYNECOLOGY | Facility: CLINIC | Age: 50
End: 2020-08-04

## 2020-08-04 NOTE — TELEPHONE ENCOUNTER
----- Message from Candelaria Ramos sent at 8/4/2020  3:34 PM CDT -----  Regarding: returning a call  Contact: pt  Type:  Patient Returning Call    Who Called:pt  Who Left Message for Patient:nurse  Does the patient know what this is regarding?:yes  Would the patient rather a call back or a response via [facility]? Call back  Best Call Back Number:[phone] ([place])   Additional Information: none

## 2020-08-07 ENCOUNTER — PATIENT OUTREACH (OUTPATIENT)
Dept: ADMINISTRATIVE | Facility: OTHER | Age: 50
End: 2020-08-07

## 2020-08-07 ENCOUNTER — OFFICE VISIT (OUTPATIENT)
Dept: OBSTETRICS AND GYNECOLOGY | Facility: CLINIC | Age: 50
End: 2020-08-07
Payer: MEDICAID

## 2020-08-07 VITALS
DIASTOLIC BLOOD PRESSURE: 76 MMHG | HEIGHT: 64 IN | BODY MASS INDEX: 31.39 KG/M2 | WEIGHT: 183.88 LBS | SYSTOLIC BLOOD PRESSURE: 118 MMHG

## 2020-08-07 DIAGNOSIS — B35.9 TINEA: ICD-10-CM

## 2020-08-07 DIAGNOSIS — Z12.31 ENCOUNTER FOR SCREENING MAMMOGRAM FOR BREAST CANCER: ICD-10-CM

## 2020-08-07 DIAGNOSIS — Z01.419 ENCOUNTER FOR GYNECOLOGICAL EXAMINATION WITHOUT ABNORMAL FINDING: Primary | ICD-10-CM

## 2020-08-07 PROCEDURE — 99999 PR PBB SHADOW E&M-EST. PATIENT-LVL III: ICD-10-PCS | Mod: PBBFAC,,, | Performed by: NURSE PRACTITIONER

## 2020-08-07 PROCEDURE — 99396 PR PREVENTIVE VISIT,EST,40-64: ICD-10-PCS | Mod: S$PBB,,, | Performed by: NURSE PRACTITIONER

## 2020-08-07 PROCEDURE — 87624 HPV HI-RISK TYP POOLED RSLT: CPT

## 2020-08-07 PROCEDURE — 99999 PR PBB SHADOW E&M-EST. PATIENT-LVL III: CPT | Mod: PBBFAC,,, | Performed by: NURSE PRACTITIONER

## 2020-08-07 PROCEDURE — 99213 OFFICE O/P EST LOW 20 MIN: CPT | Mod: PBBFAC | Performed by: NURSE PRACTITIONER

## 2020-08-07 PROCEDURE — 99396 PREV VISIT EST AGE 40-64: CPT | Mod: S$PBB,,, | Performed by: NURSE PRACTITIONER

## 2020-08-07 PROCEDURE — 88175 CYTOPATH C/V AUTO FLUID REDO: CPT

## 2020-08-07 RX ORDER — NYSTATIN 100000 [USP'U]/G
POWDER TOPICAL
Qty: 60 G | Refills: 6 | Status: SHIPPED | OUTPATIENT
Start: 2020-08-07 | End: 2021-04-26

## 2020-08-07 NOTE — PROGRESS NOTES
CC: Well woman exam    Dana Winter is a 50 y.o. female  presents for well woman exam.  LMP: Patient's last menstrual period was 2020 (approximate)..  No issues, problems, or complaints.    No issues with fibroid, bleeding normal.    Past Medical History:   Diagnosis Date    DJD (degenerative joint disease) of cervical spine     on PT    Hypertension      Past Surgical History:   Procedure Laterality Date    APPENDECTOMY      LAPAROSCOPIC APPENDECTOMY      TUBAL LIGATION       Social History     Socioeconomic History    Marital status:      Spouse name: Not on file    Number of children: 4    Years of education: Not on file    Highest education level: Not on file   Occupational History    Occupation: [employer]     Employer: [employer]   Social Needs    Financial resource strain: Not on file    Food insecurity     Worry: Not on file     Inability: Not on file    Transportation needs     Medical: Not on file     Non-medical: Not on file   Tobacco Use    Smoking status: Never Smoker    Smokeless tobacco: Never Used   Substance and Sexual Activity    Alcohol use: No    Drug use: No    Sexual activity: Yes     Partners: Male     Birth control/protection: Surgical     Comment: Tubal   Lifestyle    Physical activity     Days per week: Not on file     Minutes per session: Not on file    Stress: Not on file   Relationships    Social connections     Talks on phone: Not on file     Gets together: Not on file     Attends [employer] service: Not on file     Active member of club or organization: Not on file     Attends meetings of clubs or organizations: Not on file     Relationship status: Not on file   Other Topics Concern    Not on file   Social History Narrative    Not on file     Family History   Problem Relation Age of Onset    Hypertension Father     Stroke Father     Heart attack Father      OB History        4    Para   4    Term   4            AB      "   Living   4       SAB        TAB        Ectopic        Multiple        Live Births                     /76   Ht 5' 4" (1.626 m)   Wt 83.4 kg (183 lb 13.8 oz)   LMP 07/16/2020 (Approximate)   BMI 31.56 kg/m²       ROS:  GENERAL: Denies weight gain or weight loss. Feeling well overall.   SKIN: Denies rash or lesions.   HEAD: Denies head injury or headache.   NODES: Denies enlarged lymph nodes.   CHEST: Denies chest pain or shortness of breath.   CARDIOVASCULAR: Denies palpitations or left sided chest pain.   ABDOMEN: No abdominal pain, constipation, diarrhea, nausea, vomiting or rectal bleeding.   URINARY: No frequency, dysuria, hematuria, or burning on urination.  REPRODUCTIVE: See HPI.   BREASTS: The patient performs breast self-examination and denies pain, lumps, or nipple discharge.   HEMATOLOGIC: No easy bruisability or excessive bleeding.   MUSCULOSKELETAL: Denies joint pain or swelling.   NEUROLOGIC: Denies syncope or weakness.   PSYCHIATRIC: Denies depression, anxiety or mood swings.    PHYSICAL EXAM:  APPEARANCE: Well nourished, well developed, in no acute distress.  AFFECT: WNL, alert and oriented x 3  SKIN: No acne or hirsutism  NECK: Neck symmetric without masses or thyromegaly  NODES: No inguinal, cervical, axillary, or femoral lymph node enlargement  CHEST: Good respiratory effect  ABDOMEN: Soft.  No tenderness or masses.  No hepatosplenomegaly.  No hernias.  BREASTS: Symmetrical, no skin changes or visible lesions.  No palpable masses, nipple discharge bilaterally.  PELVIC: Normal external genitalia without lesions. Tinea noted to bilateral groin - pt states does itch.  Normal hair distribution.  Adequate perineal body, normal urethral meatus.  Vagina moist and well rugated without lesions or discharge.  Cervix pink, without lesions, discharge or tenderness.  No significant cystocele or rectocele.  Bimanual exam shows uterus to be 14-16 week  size, regular, mobile and nontender.  Adnexa " without masses or tenderness.    EXTREMITIES: No edema.  Physical Exam    1. Encounter for gynecological examination without abnormal finding  Liquid-Based Pap Smear, Screening    HPV High Risk Genotypes, PCR   2. Encounter for screening mammogram for breast cancer  Mammo Digital Screening Bilat w/ Greg   3. Tinea  nystatin (MYCOSTATIN) powder    AND PLAN:    Patient was counseled today on A.C.S. Pap guidelines and recommendations for yearly pelvic exams, mammograms and monthly self breast exams; to see her PCP for other health maintenance.

## 2020-08-14 LAB
HPV HR 12 DNA SPEC QL NAA+PROBE: NEGATIVE
HPV16 AG SPEC QL: NEGATIVE
HPV18 DNA SPEC QL NAA+PROBE: NEGATIVE

## 2020-08-24 ENCOUNTER — PATIENT MESSAGE (OUTPATIENT)
Dept: OBSTETRICS AND GYNECOLOGY | Facility: CLINIC | Age: 50
End: 2020-08-24

## 2020-08-24 LAB
FINAL PATHOLOGIC DIAGNOSIS: NORMAL
Lab: NORMAL

## 2020-12-11 ENCOUNTER — PATIENT MESSAGE (OUTPATIENT)
Dept: OTHER | Facility: OTHER | Age: 50
End: 2020-12-11

## 2021-04-26 ENCOUNTER — HOSPITAL ENCOUNTER (OUTPATIENT)
Dept: RADIOLOGY | Facility: HOSPITAL | Age: 51
Discharge: HOME | End: 2021-04-26
Attending: FAMILY MEDICINE
Payer: COMMERCIAL

## 2021-04-26 ENCOUNTER — PATIENT OUTREACH (OUTPATIENT)
Dept: ADMINISTRATIVE | Facility: OTHER | Age: 51
End: 2021-04-26

## 2021-04-26 ENCOUNTER — OFFICE VISIT (OUTPATIENT)
Dept: INTERNAL MEDICINE | Facility: CLINIC | Age: 51
End: 2021-04-26
Payer: COMMERCIAL

## 2021-04-26 VITALS
HEART RATE: 53 BPM | HEIGHT: 64 IN | OXYGEN SATURATION: 98 % | DIASTOLIC BLOOD PRESSURE: 58 MMHG | WEIGHT: 179.88 LBS | BODY MASS INDEX: 30.71 KG/M2 | TEMPERATURE: 98 F | SYSTOLIC BLOOD PRESSURE: 128 MMHG

## 2021-04-26 DIAGNOSIS — D50.0 IRON DEFICIENCY ANEMIA DUE TO CHRONIC BLOOD LOSS: ICD-10-CM

## 2021-04-26 DIAGNOSIS — E55.9 VITAMIN D DEFICIENCY: ICD-10-CM

## 2021-04-26 DIAGNOSIS — I10 ESSENTIAL HYPERTENSION: ICD-10-CM

## 2021-04-26 DIAGNOSIS — E66.9 OBESITY (BMI 30.0-34.9): ICD-10-CM

## 2021-04-26 DIAGNOSIS — M47.22 OSTEOARTHRITIS OF SPINE WITH RADICULOPATHY, CERVICAL REGION: ICD-10-CM

## 2021-04-26 DIAGNOSIS — M25.521 RIGHT ELBOW PAIN: ICD-10-CM

## 2021-04-26 DIAGNOSIS — M25.521 RIGHT ELBOW PAIN: Primary | ICD-10-CM

## 2021-04-26 DIAGNOSIS — Z29.9 PREVENTIVE MEASURE: ICD-10-CM

## 2021-04-26 PROCEDURE — 99999 PR PBB SHADOW E&M-EST. PATIENT-LVL IV: ICD-10-PCS | Mod: PBBFAC,,, | Performed by: FAMILY MEDICINE

## 2021-04-26 PROCEDURE — 3008F PR BODY MASS INDEX (BMI) DOCUMENTED: ICD-10-PCS | Mod: CPTII,S$GLB,, | Performed by: FAMILY MEDICINE

## 2021-04-26 PROCEDURE — 1125F AMNT PAIN NOTED PAIN PRSNT: CPT | Mod: S$GLB,,, | Performed by: FAMILY MEDICINE

## 2021-04-26 PROCEDURE — 99214 PR OFFICE/OUTPT VISIT, EST, LEVL IV, 30-39 MIN: ICD-10-PCS | Mod: S$GLB,,, | Performed by: FAMILY MEDICINE

## 2021-04-26 PROCEDURE — 3008F BODY MASS INDEX DOCD: CPT | Mod: CPTII,S$GLB,, | Performed by: FAMILY MEDICINE

## 2021-04-26 PROCEDURE — 73080 X-RAY EXAM OF ELBOW: CPT | Mod: 26,RT,, | Performed by: RADIOLOGY

## 2021-04-26 PROCEDURE — 73080 X-RAY EXAM OF ELBOW: CPT | Mod: TC,RT

## 2021-04-26 PROCEDURE — 99999 PR PBB SHADOW E&M-EST. PATIENT-LVL IV: CPT | Mod: PBBFAC,,, | Performed by: FAMILY MEDICINE

## 2021-04-26 PROCEDURE — 73080 XR ELBOW COMPLETE 3 VIEW RIGHT: ICD-10-PCS | Mod: 26,RT,, | Performed by: RADIOLOGY

## 2021-04-26 PROCEDURE — 1125F PR PAIN SEVERITY QUANTIFIED, PAIN PRESENT: ICD-10-PCS | Mod: S$GLB,,, | Performed by: FAMILY MEDICINE

## 2021-04-26 PROCEDURE — 99214 OFFICE O/P EST MOD 30 MIN: CPT | Mod: S$GLB,,, | Performed by: FAMILY MEDICINE

## 2021-04-26 RX ORDER — LOSARTAN POTASSIUM AND HYDROCHLOROTHIAZIDE 25; 100 MG/1; MG/1
1 TABLET ORAL DAILY
Qty: 90 TABLET | Refills: 3 | Status: SHIPPED | OUTPATIENT
Start: 2021-04-26 | End: 2023-01-30 | Stop reason: SDUPTHER

## 2021-04-26 RX ORDER — METHYLPREDNISOLONE 4 MG/1
TABLET ORAL
Qty: 1 PACKAGE | Refills: 0 | Status: SHIPPED | OUTPATIENT
Start: 2021-04-26 | End: 2021-04-27

## 2021-04-27 ENCOUNTER — OFFICE VISIT (OUTPATIENT)
Dept: OBSTETRICS AND GYNECOLOGY | Facility: CLINIC | Age: 51
End: 2021-04-27
Payer: COMMERCIAL

## 2021-04-27 VITALS
DIASTOLIC BLOOD PRESSURE: 98 MMHG | WEIGHT: 180.75 LBS | SYSTOLIC BLOOD PRESSURE: 154 MMHG | HEIGHT: 64 IN | BODY MASS INDEX: 30.86 KG/M2

## 2021-04-27 DIAGNOSIS — Z12.31 ENCOUNTER FOR SCREENING MAMMOGRAM FOR BREAST CANCER: Primary | ICD-10-CM

## 2021-04-27 PROCEDURE — 99499 UNLISTED E&M SERVICE: CPT | Mod: S$GLB,,, | Performed by: NURSE PRACTITIONER

## 2021-04-27 PROCEDURE — 3008F BODY MASS INDEX DOCD: CPT | Mod: CPTII,S$GLB,, | Performed by: NURSE PRACTITIONER

## 2021-04-27 PROCEDURE — 99499 NO LOS: ICD-10-PCS | Mod: S$GLB,,, | Performed by: NURSE PRACTITIONER

## 2021-04-27 PROCEDURE — 99999 PR PBB SHADOW E&M-EST. PATIENT-LVL III: CPT | Mod: PBBFAC,,, | Performed by: NURSE PRACTITIONER

## 2021-04-27 PROCEDURE — 99999 PR PBB SHADOW E&M-EST. PATIENT-LVL III: ICD-10-PCS | Mod: PBBFAC,,, | Performed by: NURSE PRACTITIONER

## 2021-04-27 PROCEDURE — 3008F PR BODY MASS INDEX (BMI) DOCUMENTED: ICD-10-PCS | Mod: CPTII,S$GLB,, | Performed by: NURSE PRACTITIONER

## 2021-06-07 ENCOUNTER — LAB VISIT (OUTPATIENT)
Dept: LAB | Facility: HOSPITAL | Age: 51
End: 2021-06-07
Payer: COMMERCIAL

## 2021-06-07 DIAGNOSIS — Z29.9 PREVENTIVE MEASURE: ICD-10-CM

## 2021-06-07 DIAGNOSIS — E55.9 VITAMIN D DEFICIENCY: ICD-10-CM

## 2021-06-07 DIAGNOSIS — I10 ESSENTIAL HYPERTENSION: ICD-10-CM

## 2021-06-07 DIAGNOSIS — D50.0 IRON DEFICIENCY ANEMIA DUE TO CHRONIC BLOOD LOSS: ICD-10-CM

## 2021-06-07 LAB
25(OH)D3+25(OH)D2 SERPL-MCNC: 10 NG/ML (ref 30–96)
BASOPHILS # BLD AUTO: 0.04 K/UL (ref 0–0.2)
BASOPHILS NFR BLD: 0.7 % (ref 0–1.9)
BILIRUB UR QL STRIP: NEGATIVE
CLARITY UR: CLEAR
COLOR UR: NORMAL
DIFFERENTIAL METHOD: ABNORMAL
EOSINOPHIL # BLD AUTO: 0.1 K/UL (ref 0–0.5)
EOSINOPHIL NFR BLD: 1.3 % (ref 0–8)
ERYTHROCYTE [DISTWIDTH] IN BLOOD BY AUTOMATED COUNT: 15.6 % (ref 11.5–14.5)
ESTIMATED AVG GLUCOSE: 108 MG/DL (ref 68–131)
GLUCOSE UR QL STRIP: NEGATIVE
HBA1C MFR BLD: 5.4 % (ref 4–5.6)
HCT VFR BLD AUTO: 32.3 % (ref 37–48.5)
HGB BLD-MCNC: 10.1 G/DL (ref 12–16)
HGB UR QL STRIP: NEGATIVE
IMM GRANULOCYTES # BLD AUTO: 0.01 K/UL (ref 0–0.04)
IMM GRANULOCYTES NFR BLD AUTO: 0.2 % (ref 0–0.5)
KETONES UR QL STRIP: NEGATIVE
LEUKOCYTE ESTERASE UR QL STRIP: NEGATIVE
LYMPHOCYTES # BLD AUTO: 1.4 K/UL (ref 1–4.8)
LYMPHOCYTES NFR BLD: 25.8 % (ref 18–48)
MCH RBC QN AUTO: 25.6 PG (ref 27–31)
MCHC RBC AUTO-ENTMCNC: 31.3 G/DL (ref 32–36)
MCV RBC AUTO: 82 FL (ref 82–98)
MONOCYTES # BLD AUTO: 0.5 K/UL (ref 0.3–1)
MONOCYTES NFR BLD: 8.3 % (ref 4–15)
NEUTROPHILS # BLD AUTO: 3.5 K/UL (ref 1.8–7.7)
NEUTROPHILS NFR BLD: 63.7 % (ref 38–73)
NITRITE UR QL STRIP: NEGATIVE
NRBC BLD-RTO: 0 /100 WBC
PH UR STRIP: 7 [PH] (ref 5–8)
PLATELET # BLD AUTO: 372 K/UL (ref 150–450)
PMV BLD AUTO: 9.6 FL (ref 9.2–12.9)
PROT UR QL STRIP: NEGATIVE
RBC # BLD AUTO: 3.94 M/UL (ref 4–5.4)
SP GR UR STRIP: 1.01 (ref 1–1.03)
URN SPEC COLLECT METH UR: NORMAL
WBC # BLD AUTO: 5.51 K/UL (ref 3.9–12.7)

## 2021-06-07 PROCEDURE — 83036 HEMOGLOBIN GLYCOSYLATED A1C: CPT | Performed by: FAMILY MEDICINE

## 2021-06-07 PROCEDURE — 36415 COLL VENOUS BLD VENIPUNCTURE: CPT | Performed by: FAMILY MEDICINE

## 2021-06-07 PROCEDURE — 84443 ASSAY THYROID STIM HORMONE: CPT | Performed by: FAMILY MEDICINE

## 2021-06-07 PROCEDURE — 80061 LIPID PANEL: CPT | Performed by: FAMILY MEDICINE

## 2021-06-07 PROCEDURE — 83540 ASSAY OF IRON: CPT | Performed by: FAMILY MEDICINE

## 2021-06-07 PROCEDURE — 81003 URINALYSIS AUTO W/O SCOPE: CPT | Performed by: FAMILY MEDICINE

## 2021-06-07 PROCEDURE — 82728 ASSAY OF FERRITIN: CPT | Performed by: FAMILY MEDICINE

## 2021-06-07 PROCEDURE — 82306 VITAMIN D 25 HYDROXY: CPT | Performed by: FAMILY MEDICINE

## 2021-06-07 PROCEDURE — 85025 COMPLETE CBC W/AUTO DIFF WBC: CPT | Performed by: FAMILY MEDICINE

## 2021-06-07 PROCEDURE — 80053 COMPREHEN METABOLIC PANEL: CPT | Performed by: FAMILY MEDICINE

## 2021-06-08 DIAGNOSIS — E55.9 VITAMIN D DEFICIENCY: Primary | ICD-10-CM

## 2021-06-08 DIAGNOSIS — D50.0 IRON DEFICIENCY ANEMIA DUE TO CHRONIC BLOOD LOSS: ICD-10-CM

## 2021-06-08 LAB
ALBUMIN SERPL BCP-MCNC: 3.1 G/DL (ref 3.5–5.2)
ALP SERPL-CCNC: 81 U/L (ref 55–135)
ALT SERPL W/O P-5'-P-CCNC: 7 U/L (ref 10–44)
ANION GAP SERPL CALC-SCNC: 9 MMOL/L (ref 8–16)
AST SERPL-CCNC: 14 U/L (ref 10–40)
BILIRUB SERPL-MCNC: 0.2 MG/DL (ref 0.1–1)
BUN SERPL-MCNC: 15 MG/DL (ref 6–20)
CALCIUM SERPL-MCNC: 8.6 MG/DL (ref 8.7–10.5)
CHLORIDE SERPL-SCNC: 108 MMOL/L (ref 95–110)
CHOLEST SERPL-MCNC: 204 MG/DL (ref 120–199)
CHOLEST/HDLC SERPL: 3.7 {RATIO} (ref 2–5)
CO2 SERPL-SCNC: 24 MMOL/L (ref 23–29)
CREAT SERPL-MCNC: 0.9 MG/DL (ref 0.5–1.4)
EST. GFR  (AFRICAN AMERICAN): >60 ML/MIN/1.73 M^2
EST. GFR  (NON AFRICAN AMERICAN): >60 ML/MIN/1.73 M^2
FERRITIN SERPL-MCNC: 7 NG/ML (ref 20–300)
GLUCOSE SERPL-MCNC: 76 MG/DL (ref 70–110)
HDLC SERPL-MCNC: 55 MG/DL (ref 40–75)
HDLC SERPL: 27 % (ref 20–50)
IRON SERPL-MCNC: 42 UG/DL (ref 30–160)
LDLC SERPL CALC-MCNC: 133.4 MG/DL (ref 63–159)
NONHDLC SERPL-MCNC: 149 MG/DL
POTASSIUM SERPL-SCNC: 4 MMOL/L (ref 3.5–5.1)
PROT SERPL-MCNC: 6.7 G/DL (ref 6–8.4)
SATURATED IRON: 10 % (ref 20–50)
SODIUM SERPL-SCNC: 141 MMOL/L (ref 136–145)
TOTAL IRON BINDING CAPACITY: 426 UG/DL (ref 250–450)
TRANSFERRIN SERPL-MCNC: 288 MG/DL (ref 200–375)
TRIGL SERPL-MCNC: 78 MG/DL (ref 30–150)
TSH SERPL DL<=0.005 MIU/L-ACNC: 0.96 UIU/ML (ref 0.4–4)

## 2021-06-08 RX ORDER — ERGOCALCIFEROL 1.25 MG/1
50000 CAPSULE ORAL
Qty: 10 CAPSULE | Refills: 1 | Status: SHIPPED | OUTPATIENT
Start: 2021-06-10 | End: 2023-01-31

## 2021-06-08 RX ORDER — FERROUS SULFATE 325(65) MG
325 TABLET ORAL 2 TIMES DAILY
Qty: 60 TABLET | Refills: 5 | Status: SHIPPED | OUTPATIENT
Start: 2021-06-08 | End: 2023-01-31

## 2021-06-11 ENCOUNTER — OFFICE VISIT (OUTPATIENT)
Dept: INTERNAL MEDICINE | Facility: CLINIC | Age: 51
End: 2021-06-11
Payer: COMMERCIAL

## 2021-06-11 ENCOUNTER — HOSPITAL ENCOUNTER (OUTPATIENT)
Dept: CARDIOLOGY | Facility: HOSPITAL | Age: 51
Discharge: HOME | End: 2021-06-11
Payer: COMMERCIAL

## 2021-06-11 VITALS
BODY MASS INDEX: 30.14 KG/M2 | OXYGEN SATURATION: 97 % | SYSTOLIC BLOOD PRESSURE: 124 MMHG | HEIGHT: 64 IN | WEIGHT: 176.56 LBS | TEMPERATURE: 99 F | DIASTOLIC BLOOD PRESSURE: 86 MMHG | RESPIRATION RATE: 18 BRPM | HEART RATE: 72 BPM

## 2021-06-11 DIAGNOSIS — I10 ESSENTIAL HYPERTENSION: ICD-10-CM

## 2021-06-11 DIAGNOSIS — Z12.11 COLON CANCER SCREENING: ICD-10-CM

## 2021-06-11 DIAGNOSIS — Z00.00 ROUTINE GENERAL MEDICAL EXAMINATION AT A HEALTH CARE FACILITY: Primary | ICD-10-CM

## 2021-06-11 DIAGNOSIS — M47.22 OSTEOARTHRITIS OF SPINE WITH RADICULOPATHY, CERVICAL REGION: ICD-10-CM

## 2021-06-11 DIAGNOSIS — E66.9 OBESITY (BMI 30.0-34.9): ICD-10-CM

## 2021-06-11 DIAGNOSIS — D50.0 IRON DEFICIENCY ANEMIA DUE TO CHRONIC BLOOD LOSS: ICD-10-CM

## 2021-06-11 DIAGNOSIS — E55.9 VITAMIN D DEFICIENCY: ICD-10-CM

## 2021-06-11 DIAGNOSIS — Z00.00 ROUTINE GENERAL MEDICAL EXAMINATION AT A HEALTH CARE FACILITY: ICD-10-CM

## 2021-06-11 PROCEDURE — 99999 PR PBB SHADOW E&M-EST. PATIENT-LVL III: ICD-10-PCS | Mod: PBBFAC,,, | Performed by: FAMILY MEDICINE

## 2021-06-11 PROCEDURE — 99396 PREV VISIT EST AGE 40-64: CPT | Mod: S$GLB,,, | Performed by: FAMILY MEDICINE

## 2021-06-11 PROCEDURE — 93010 EKG 12-LEAD: ICD-10-PCS | Mod: ,,, | Performed by: INTERNAL MEDICINE

## 2021-06-11 PROCEDURE — 93005 ELECTROCARDIOGRAM TRACING: CPT

## 2021-06-11 PROCEDURE — 3008F PR BODY MASS INDEX (BMI) DOCUMENTED: ICD-10-PCS | Mod: CPTII,S$GLB,, | Performed by: FAMILY MEDICINE

## 2021-06-11 PROCEDURE — 3008F BODY MASS INDEX DOCD: CPT | Mod: CPTII,S$GLB,, | Performed by: FAMILY MEDICINE

## 2021-06-11 PROCEDURE — 93010 ELECTROCARDIOGRAM REPORT: CPT | Mod: ,,, | Performed by: INTERNAL MEDICINE

## 2021-06-11 PROCEDURE — 99396 PR PREVENTIVE VISIT,EST,40-64: ICD-10-PCS | Mod: S$GLB,,, | Performed by: FAMILY MEDICINE

## 2021-06-11 PROCEDURE — 99999 PR PBB SHADOW E&M-EST. PATIENT-LVL III: CPT | Mod: PBBFAC,,, | Performed by: FAMILY MEDICINE

## 2021-06-11 RX ORDER — SODIUM, POTASSIUM,MAG SULFATES 17.5-3.13G
1 SOLUTION, RECONSTITUTED, ORAL ORAL DAILY
Qty: 1 KIT | Refills: 0 | Status: SHIPPED | OUTPATIENT
Start: 2021-06-11 | End: 2021-06-13

## 2021-07-22 ENCOUNTER — TELEPHONE (OUTPATIENT)
Dept: PREADMISSION TESTING | Facility: HOSPITAL | Age: 51
End: 2021-07-22

## 2021-07-22 ENCOUNTER — ANESTHESIA EVENT (OUTPATIENT)
Dept: ENDOSCOPY | Facility: HOSPITAL | Age: 51
End: 2021-07-22
Payer: COMMERCIAL

## 2021-07-23 ENCOUNTER — HOSPITAL ENCOUNTER (OUTPATIENT)
Facility: HOSPITAL | Age: 51
Discharge: HOME | End: 2021-07-23
Attending: INTERNAL MEDICINE | Admitting: INTERNAL MEDICINE
Payer: COMMERCIAL

## 2021-07-23 ENCOUNTER — ANESTHESIA (OUTPATIENT)
Dept: ENDOSCOPY | Facility: HOSPITAL | Age: 51
End: 2021-07-23
Payer: MEDICAID

## 2021-07-23 VITALS
HEIGHT: 64 IN | OXYGEN SATURATION: 100 % | TEMPERATURE: 98 F | HEART RATE: 67 BPM | DIASTOLIC BLOOD PRESSURE: 73 MMHG | BODY MASS INDEX: 29.45 KG/M2 | WEIGHT: 172.5 LBS | SYSTOLIC BLOOD PRESSURE: 108 MMHG | RESPIRATION RATE: 18 BRPM

## 2021-07-23 DIAGNOSIS — Z12.11 ENCOUNTER FOR SCREENING COLONOSCOPY: Primary | ICD-10-CM

## 2021-07-23 LAB
B-HCG UR QL: NEGATIVE
CTP QC/QA: YES

## 2021-07-23 PROCEDURE — 27201012 HC FORCEPS, HOT/COLD, DISP: Performed by: INTERNAL MEDICINE

## 2021-07-23 PROCEDURE — D9220A PRA ANESTHESIA: ICD-10-PCS | Mod: 33,ANES,, | Performed by: ANESTHESIOLOGY

## 2021-07-23 PROCEDURE — 88305 TISSUE EXAM BY PATHOLOGIST: ICD-10-PCS | Mod: 26,,, | Performed by: PATHOLOGY

## 2021-07-23 PROCEDURE — D9220A PRA ANESTHESIA: ICD-10-PCS | Mod: 33,CRNA,, | Performed by: NURSE ANESTHETIST, CERTIFIED REGISTERED

## 2021-07-23 PROCEDURE — 25000003 PHARM REV CODE 250: Performed by: NURSE ANESTHETIST, CERTIFIED REGISTERED

## 2021-07-23 PROCEDURE — 45380 COLONOSCOPY AND BIOPSY: CPT | Performed by: INTERNAL MEDICINE

## 2021-07-23 PROCEDURE — D9220A PRA ANESTHESIA: Mod: 33,ANES,, | Performed by: ANESTHESIOLOGY

## 2021-07-23 PROCEDURE — 81025 URINE PREGNANCY TEST: CPT | Performed by: INTERNAL MEDICINE

## 2021-07-23 PROCEDURE — D9220A PRA ANESTHESIA: Mod: 33,CRNA,, | Performed by: NURSE ANESTHETIST, CERTIFIED REGISTERED

## 2021-07-23 PROCEDURE — 37000008 HC ANESTHESIA 1ST 15 MINUTES: Performed by: INTERNAL MEDICINE

## 2021-07-23 PROCEDURE — 45380 PR COLONOSCOPY,BIOPSY: ICD-10-PCS | Mod: 33,,, | Performed by: INTERNAL MEDICINE

## 2021-07-23 PROCEDURE — 88305 TISSUE EXAM BY PATHOLOGIST: CPT | Mod: 26,,, | Performed by: PATHOLOGY

## 2021-07-23 PROCEDURE — 63600175 PHARM REV CODE 636 W HCPCS: Performed by: NURSE ANESTHETIST, CERTIFIED REGISTERED

## 2021-07-23 PROCEDURE — 45380 COLONOSCOPY AND BIOPSY: CPT | Mod: 33,,, | Performed by: INTERNAL MEDICINE

## 2021-07-23 PROCEDURE — 63600175 PHARM REV CODE 636 W HCPCS: Performed by: INTERNAL MEDICINE

## 2021-07-23 PROCEDURE — 37000009 HC ANESTHESIA EA ADD 15 MINS: Performed by: INTERNAL MEDICINE

## 2021-07-23 PROCEDURE — 88305 TISSUE EXAM BY PATHOLOGIST: CPT | Performed by: PATHOLOGY

## 2021-07-23 RX ORDER — PROPOFOL 10 MG/ML
VIAL (ML) INTRAVENOUS
Status: DISCONTINUED | OUTPATIENT
Start: 2021-07-23 | End: 2021-07-23

## 2021-07-23 RX ORDER — SODIUM CHLORIDE 0.9 % (FLUSH) 0.9 %
10 SYRINGE (ML) INJECTION
Status: DISCONTINUED | OUTPATIENT
Start: 2021-07-23 | End: 2021-07-23 | Stop reason: HOSPADM

## 2021-07-23 RX ORDER — SODIUM CHLORIDE, SODIUM LACTATE, POTASSIUM CHLORIDE, CALCIUM CHLORIDE 600; 310; 30; 20 MG/100ML; MG/100ML; MG/100ML; MG/100ML
INJECTION, SOLUTION INTRAVENOUS CONTINUOUS
Status: DISCONTINUED | OUTPATIENT
Start: 2021-07-23 | End: 2021-07-23 | Stop reason: HOSPADM

## 2021-07-23 RX ORDER — LIDOCAINE HYDROCHLORIDE 10 MG/ML
INJECTION, SOLUTION EPIDURAL; INFILTRATION; INTRACAUDAL; PERINEURAL
Status: DISCONTINUED | OUTPATIENT
Start: 2021-07-23 | End: 2021-07-23

## 2021-07-23 RX ADMIN — PROPOFOL 100 MG: 10 INJECTION, EMULSION INTRAVENOUS at 12:07

## 2021-07-23 RX ADMIN — LIDOCAINE HYDROCHLORIDE 50 MG: 10 INJECTION, SOLUTION EPIDURAL; INFILTRATION; INTRACAUDAL; PERINEURAL at 12:07

## 2021-07-23 RX ADMIN — SODIUM CHLORIDE, SODIUM LACTATE, POTASSIUM CHLORIDE, AND CALCIUM CHLORIDE: .6; .31; .03; .02 INJECTION, SOLUTION INTRAVENOUS at 10:07

## 2021-07-23 RX ADMIN — PROPOFOL 20 MG: 10 INJECTION, EMULSION INTRAVENOUS at 12:07

## 2021-07-29 LAB
FINAL PATHOLOGIC DIAGNOSIS: NORMAL
Lab: NORMAL

## 2021-08-05 ENCOUNTER — TELEPHONE (OUTPATIENT)
Dept: OBSTETRICS AND GYNECOLOGY | Facility: CLINIC | Age: 51
End: 2021-08-05

## 2021-08-13 ENCOUNTER — HOSPITAL ENCOUNTER (OUTPATIENT)
Dept: RADIOLOGY | Facility: HOSPITAL | Age: 51
Discharge: HOME | End: 2021-08-13
Attending: NURSE PRACTITIONER
Payer: COMMERCIAL

## 2021-08-13 VITALS — BODY MASS INDEX: 29.47 KG/M2 | HEIGHT: 64 IN | WEIGHT: 172.63 LBS

## 2021-08-13 DIAGNOSIS — Z12.31 ENCOUNTER FOR SCREENING MAMMOGRAM FOR BREAST CANCER: ICD-10-CM

## 2021-08-13 PROCEDURE — 77067 SCR MAMMO BI INCL CAD: CPT | Mod: TC

## 2021-08-13 PROCEDURE — 77067 SCR MAMMO BI INCL CAD: CPT | Mod: 26,,, | Performed by: RADIOLOGY

## 2021-08-13 PROCEDURE — 77067 MAMMO DIGITAL SCREENING BILAT WITH TOMO: ICD-10-PCS | Mod: 26,,, | Performed by: RADIOLOGY

## 2021-08-13 PROCEDURE — 77063 BREAST TOMOSYNTHESIS BI: CPT | Mod: 26,,, | Performed by: RADIOLOGY

## 2021-08-13 PROCEDURE — 77063 MAMMO DIGITAL SCREENING BILAT WITH TOMO: ICD-10-PCS | Mod: 26,,, | Performed by: RADIOLOGY

## 2021-12-20 ENCOUNTER — TELEPHONE (OUTPATIENT)
Dept: INTERNAL MEDICINE | Facility: CLINIC | Age: 51
End: 2021-12-20
Payer: MEDICAID

## 2021-12-27 ENCOUNTER — TELEPHONE (OUTPATIENT)
Dept: INTERNAL MEDICINE | Facility: CLINIC | Age: 51
End: 2021-12-27
Payer: MEDICAID

## 2021-12-28 ENCOUNTER — TELEPHONE (OUTPATIENT)
Dept: INTERNAL MEDICINE | Facility: CLINIC | Age: 51
End: 2021-12-28
Payer: MEDICAID

## 2022-01-13 ENCOUNTER — HOSPITAL ENCOUNTER (OUTPATIENT)
Dept: RADIOLOGY | Facility: HOSPITAL | Age: 52
Discharge: HOME | End: 2022-01-13
Attending: PHYSICIAN ASSISTANT
Payer: MEDICAID

## 2022-01-13 ENCOUNTER — OFFICE VISIT (OUTPATIENT)
Dept: INTERNAL MEDICINE | Facility: CLINIC | Age: 52
End: 2022-01-13
Payer: MEDICAID

## 2022-01-13 VITALS
HEIGHT: 64 IN | WEIGHT: 181.88 LBS | DIASTOLIC BLOOD PRESSURE: 90 MMHG | TEMPERATURE: 100 F | SYSTOLIC BLOOD PRESSURE: 138 MMHG | BODY MASS INDEX: 31.05 KG/M2 | HEART RATE: 95 BPM | OXYGEN SATURATION: 98 %

## 2022-01-13 DIAGNOSIS — J06.9 VIRAL URI WITH COUGH: ICD-10-CM

## 2022-01-13 DIAGNOSIS — J06.9 VIRAL URI WITH COUGH: Primary | ICD-10-CM

## 2022-01-13 DIAGNOSIS — J22 LOWER RESPIRATORY INFECTION: ICD-10-CM

## 2022-01-13 PROCEDURE — 3080F DIAST BP >= 90 MM HG: CPT | Mod: CPTII,,, | Performed by: PHYSICIAN ASSISTANT

## 2022-01-13 PROCEDURE — 1160F RVW MEDS BY RX/DR IN RCRD: CPT | Mod: CPTII,,, | Performed by: PHYSICIAN ASSISTANT

## 2022-01-13 PROCEDURE — 3080F PR MOST RECENT DIASTOLIC BLOOD PRESSURE >= 90 MM HG: ICD-10-PCS | Mod: CPTII,,, | Performed by: PHYSICIAN ASSISTANT

## 2022-01-13 PROCEDURE — 71046 XR CHEST PA AND LATERAL: ICD-10-PCS | Mod: 26,,, | Performed by: RADIOLOGY

## 2022-01-13 PROCEDURE — 99214 PR OFFICE/OUTPT VISIT, EST, LEVL IV, 30-39 MIN: ICD-10-PCS | Mod: S$PBB,,, | Performed by: PHYSICIAN ASSISTANT

## 2022-01-13 PROCEDURE — 3075F SYST BP GE 130 - 139MM HG: CPT | Mod: CPTII,,, | Performed by: PHYSICIAN ASSISTANT

## 2022-01-13 PROCEDURE — 71046 X-RAY EXAM CHEST 2 VIEWS: CPT | Mod: TC

## 2022-01-13 PROCEDURE — 99214 OFFICE O/P EST MOD 30 MIN: CPT | Mod: PBBFAC,25 | Performed by: PHYSICIAN ASSISTANT

## 2022-01-13 PROCEDURE — 99214 OFFICE O/P EST MOD 30 MIN: CPT | Mod: S$PBB,,, | Performed by: PHYSICIAN ASSISTANT

## 2022-01-13 PROCEDURE — 3008F PR BODY MASS INDEX (BMI) DOCUMENTED: ICD-10-PCS | Mod: CPTII,,, | Performed by: PHYSICIAN ASSISTANT

## 2022-01-13 PROCEDURE — 3008F BODY MASS INDEX DOCD: CPT | Mod: CPTII,,, | Performed by: PHYSICIAN ASSISTANT

## 2022-01-13 PROCEDURE — 99999 PR PBB SHADOW E&M-EST. PATIENT-LVL IV: CPT | Mod: PBBFAC,,, | Performed by: PHYSICIAN ASSISTANT

## 2022-01-13 PROCEDURE — 99999 PR PBB SHADOW E&M-EST. PATIENT-LVL IV: ICD-10-PCS | Mod: PBBFAC,,, | Performed by: PHYSICIAN ASSISTANT

## 2022-01-13 PROCEDURE — 3075F PR MOST RECENT SYSTOLIC BLOOD PRESS GE 130-139MM HG: ICD-10-PCS | Mod: CPTII,,, | Performed by: PHYSICIAN ASSISTANT

## 2022-01-13 PROCEDURE — 1159F PR MEDICATION LIST DOCUMENTED IN MEDICAL RECORD: ICD-10-PCS | Mod: CPTII,,, | Performed by: PHYSICIAN ASSISTANT

## 2022-01-13 PROCEDURE — 1159F MED LIST DOCD IN RCRD: CPT | Mod: CPTII,,, | Performed by: PHYSICIAN ASSISTANT

## 2022-01-13 PROCEDURE — 71046 X-RAY EXAM CHEST 2 VIEWS: CPT | Mod: 26,,, | Performed by: RADIOLOGY

## 2022-01-13 PROCEDURE — 1160F PR REVIEW ALL MEDS BY PRESCRIBER/CLIN PHARMACIST DOCUMENTED: ICD-10-PCS | Mod: CPTII,,, | Performed by: PHYSICIAN ASSISTANT

## 2022-01-13 RX ORDER — FLUTICASONE PROPIONATE 50 MCG
1 SPRAY, SUSPENSION (ML) NASAL 2 TIMES DAILY
Qty: 9.9 ML | Refills: 0 | Status: SHIPPED | OUTPATIENT
Start: 2022-01-13 | End: 2022-02-12

## 2022-01-13 RX ORDER — BENZONATATE 200 MG/1
200 CAPSULE ORAL 3 TIMES DAILY PRN
Qty: 30 CAPSULE | Refills: 0 | Status: SHIPPED | OUTPATIENT
Start: 2022-01-13 | End: 2022-01-23

## 2022-01-13 RX ORDER — AZITHROMYCIN 250 MG/1
TABLET, FILM COATED ORAL
Qty: 6 TABLET | Refills: 0 | Status: SHIPPED | OUTPATIENT
Start: 2022-01-13 | End: 2022-01-18

## 2022-01-13 RX ORDER — PROMETHAZINE HYDROCHLORIDE AND DEXTROMETHORPHAN HYDROBROMIDE 6.25; 15 MG/5ML; MG/5ML
5 SYRUP ORAL EVERY 8 HOURS PRN
Qty: 118 ML | Refills: 0 | Status: SHIPPED | OUTPATIENT
Start: 2022-01-13 | End: 2022-01-20

## 2022-01-13 NOTE — PROGRESS NOTES
Subjective:      Patient ID: Dana Winter is a 51 y.o. female.    Chief Complaint: Follow-up    2 weeks ago had weakness/body aches. Didn't get tested for covid. Most symptoms resolved but still has cough and mild shortness of breath with talking.     Cough  This is a new problem. Episode onset: 2 weeks. The problem has been unchanged. The cough is non-productive. Associated symptoms include shortness of breath. Pertinent negatives include no chest pain, chills, ear congestion, ear pain, fever, headaches, heartburn, hemoptysis, myalgias, nasal congestion, postnasal drip, rash, rhinorrhea, sore throat, sweats, weight loss or wheezing. Treatments tried: theraflu day and night. The treatment provided no relief. There is no history of asthma, bronchiectasis, bronchitis, COPD, emphysema, environmental allergies or pneumonia.     Didnt take her BP meds yet.     Patient Active Problem List   Diagnosis    Essential hypertension    DJD (degenerative joint disease) of cervical spine    Iron deficiency anemia due to chronic blood loss    Vitamin D deficiency    Obesity (BMI 30.0-34.9)    Encounter for screening colonoscopy       Current Outpatient Medications:     ergocalciferol (ERGOCALCIFEROL) 50,000 unit Cap, Take 1 capsule (50,000 Units total) by mouth twice a week., Disp: 10 capsule, Rfl: 1    ferrous sulfate (FEOSOL) 325 mg (65 mg iron) Tab tablet, Take 1 tablet (325 mg total) by mouth 2 (two) times daily., Disp: 60 tablet, Rfl: 5    losartan-hydrochlorothiazide 100-25 mg (HYZAAR) 100-25 mg per tablet, Take 1 tablet by mouth once daily., Disp: 90 tablet, Rfl: 3    azithromycin (Z-CHACORTA) 250 MG tablet, Take 2 tablets by mouth on day 1; Take 1 tablet by mouth on days 2-5, Disp: 6 tablet, Rfl: 0    benzonatate (TESSALON) 200 MG capsule, Take 1 capsule (200 mg total) by mouth 3 (three) times daily as needed for Cough., Disp: 30 capsule, Rfl: 0    fluticasone propionate (FLONASE) 50 mcg/actuation nasal  spray, 1 spray (50 mcg total) by Each Nostril route 2 (two) times a day., Disp: 9.9 mL, Rfl: 0    promethazine-dextromethorphan (PROMETHAZINE-DM) 6.25-15 mg/5 mL Syrp, Take 5 mLs by mouth every 8 (eight) hours as needed (cough)., Disp: 118 mL, Rfl: 0  Health Maintenance Due   Topic Date Due    COVID-19 Vaccine (1) Never done    Pneumococcal Vaccines (Age 0-64) (1 of 2 - PPSV23) Never done    Shingles Vaccine (1 of 2) Never done    Influenza Vaccine (1) Never done       Review of Systems   Constitutional: Negative for activity change, appetite change, chills, diaphoresis, fatigue, fever, unexpected weight change and weight loss.   HENT: Negative.  Negative for congestion, ear pain, hearing loss, postnasal drip, rhinorrhea, sore throat, trouble swallowing and voice change.    Eyes: Negative.  Negative for visual disturbance.   Respiratory: Positive for cough and shortness of breath. Negative for hemoptysis, choking, chest tightness and wheezing.    Cardiovascular: Negative for chest pain, palpitations and leg swelling.   Gastrointestinal: Negative for abdominal distention, abdominal pain, blood in stool, constipation, diarrhea, heartburn, nausea and vomiting.   Endocrine: Negative for cold intolerance, heat intolerance, polydipsia and polyuria.   Genitourinary: Negative.  Negative for difficulty urinating and frequency.   Musculoskeletal: Negative for arthralgias, back pain, gait problem, joint swelling and myalgias.   Skin: Negative for color change, pallor, rash and wound.   Allergic/Immunologic: Negative for environmental allergies.   Neurological: Negative for dizziness, tremors, weakness, light-headedness, numbness and headaches.   Hematological: Negative for adenopathy.   Psychiatric/Behavioral: Negative for behavioral problems, confusion, self-injury, sleep disturbance and suicidal ideas. The patient is not nervous/anxious.      Objective:   BP (!) 138/90 (BP Location: Left arm, Patient Position: Sitting,  "BP Method: Large (Manual))   Pulse 95   Temp 99.5 °F (37.5 °C) (Tympanic)   Ht 5' 4" (1.626 m)   Wt 82.5 kg (181 lb 14.1 oz)   SpO2 98%   BMI 31.22 kg/m²     Physical Exam  Vitals and nursing note reviewed.   Constitutional:       General: She is not in acute distress.     Appearance: Normal appearance. She is well-developed and well-nourished. She is not ill-appearing, toxic-appearing or diaphoretic.   HENT:      Head: Normocephalic and atraumatic.   Cardiovascular:      Rate and Rhythm: Normal rate and regular rhythm.      Pulses: Intact distal pulses.      Heart sounds: Normal heart sounds. No murmur heard.  No friction rub. No gallop.    Pulmonary:      Effort: Pulmonary effort is normal. No respiratory distress.      Breath sounds: Normal breath sounds. No wheezing or rales.   Musculoskeletal:         General: Normal range of motion.   Skin:     General: Skin is warm.      Capillary Refill: Capillary refill takes less than 2 seconds.      Findings: No rash.   Neurological:      Mental Status: She is alert and oriented to person, place, and time.   Psychiatric:         Mood and Affect: Mood and affect normal.         Behavior: Behavior normal.         Thought Content: Thought content normal.         Judgment: Judgment normal.       X-Ray Chest PA And Lateral  Narrative: EXAMINATION:  XR CHEST PA AND LATERAL    CLINICAL HISTORY:  Acute upper respiratory infection, unspecified    TECHNIQUE:  PA and lateral views of the chest were performed.    COMPARISON:  07/10/2020    FINDINGS:  Lung volumes are low which accentuates the heart size and bronchovascular markings.    Bilateral pulmonary interstitial opacities seen on prior examination appear improved.  No definite focal parenchymal consolidation or pleural effusion visualized.  No acute osseous findings demonstrated.  Impression: Suspect interval improvement allowing for low lung volumes.    Electronically signed by: Tomy Giordano, " MD  Date:    01/13/2022  Time:    14:54      Assessment:     1. Viral URI with cough    2. Lower respiratory infection      Plan:   Viral URI with cough  -     fluticasone propionate (FLONASE) 50 mcg/actuation nasal spray; 1 spray (50 mcg total) by Each Nostril route 2 (two) times a day.  Dispense: 9.9 mL; Refill: 0  -     benzonatate (TESSALON) 200 MG capsule; Take 1 capsule (200 mg total) by mouth 3 (three) times daily as needed for Cough.  Dispense: 30 capsule; Refill: 0  -     promethazine-dextromethorphan (PROMETHAZINE-DM) 6.25-15 mg/5 mL Syrp; Take 5 mLs by mouth every 8 (eight) hours as needed (cough).  Dispense: 118 mL; Refill: 0  -     X-Ray Chest PA And Lateral; Future; Expected date: 01/13/2022    Lower respiratory infection  -     azithromycin (Z-CHACORTA) 250 MG tablet; Take 2 tablets by mouth on day 1; Take 1 tablet by mouth on days 2-5  Dispense: 6 tablet; Refill: 0      Follow up if symptoms worsen or fail to improve.

## 2022-03-22 ENCOUNTER — PATIENT MESSAGE (OUTPATIENT)
Dept: RESEARCH | Facility: HOSPITAL | Age: 52
End: 2022-03-22
Payer: MEDICAID

## 2022-08-24 DIAGNOSIS — I10 ESSENTIAL HYPERTENSION: ICD-10-CM

## 2022-11-16 DIAGNOSIS — Z12.31 OTHER SCREENING MAMMOGRAM: ICD-10-CM

## 2023-01-30 ENCOUNTER — LAB VISIT (OUTPATIENT)
Dept: LAB | Facility: HOSPITAL | Age: 53
End: 2023-01-30
Attending: PHYSICIAN ASSISTANT
Payer: COMMERCIAL

## 2023-01-30 ENCOUNTER — OFFICE VISIT (OUTPATIENT)
Dept: INTERNAL MEDICINE | Facility: CLINIC | Age: 53
End: 2023-01-30
Payer: COMMERCIAL

## 2023-01-30 VITALS
WEIGHT: 186.5 LBS | TEMPERATURE: 98 F | HEART RATE: 77 BPM | SYSTOLIC BLOOD PRESSURE: 138 MMHG | BODY MASS INDEX: 31.84 KG/M2 | OXYGEN SATURATION: 97 % | DIASTOLIC BLOOD PRESSURE: 94 MMHG | HEIGHT: 64 IN

## 2023-01-30 DIAGNOSIS — D50.0 IRON DEFICIENCY ANEMIA DUE TO CHRONIC BLOOD LOSS: ICD-10-CM

## 2023-01-30 DIAGNOSIS — E55.9 VITAMIN D DEFICIENCY: ICD-10-CM

## 2023-01-30 DIAGNOSIS — I10 ESSENTIAL HYPERTENSION: ICD-10-CM

## 2023-01-30 DIAGNOSIS — Z00.00 ROUTINE HEALTH MAINTENANCE: ICD-10-CM

## 2023-01-30 DIAGNOSIS — H53.8 BLURRED VISION, BILATERAL: ICD-10-CM

## 2023-01-30 DIAGNOSIS — E66.9 OBESITY (BMI 30.0-34.9): ICD-10-CM

## 2023-01-30 DIAGNOSIS — Z00.00 ROUTINE HEALTH MAINTENANCE: Primary | ICD-10-CM

## 2023-01-30 PROBLEM — Z12.11 ENCOUNTER FOR SCREENING COLONOSCOPY: Status: RESOLVED | Noted: 2021-07-23 | Resolved: 2023-01-30

## 2023-01-30 LAB
25(OH)D3+25(OH)D2 SERPL-MCNC: 11 NG/ML (ref 30–96)
ALBUMIN SERPL BCP-MCNC: 3.3 G/DL (ref 3.5–5.2)
ALP SERPL-CCNC: 85 U/L (ref 55–135)
ALT SERPL W/O P-5'-P-CCNC: 8 U/L (ref 10–44)
ANION GAP SERPL CALC-SCNC: 8 MMOL/L (ref 8–16)
AST SERPL-CCNC: 17 U/L (ref 10–40)
BASOPHILS # BLD AUTO: 0.04 K/UL (ref 0–0.2)
BASOPHILS NFR BLD: 0.7 % (ref 0–1.9)
BILIRUB SERPL-MCNC: 0.1 MG/DL (ref 0.1–1)
BUN SERPL-MCNC: 12 MG/DL (ref 6–20)
CALCIUM SERPL-MCNC: 9 MG/DL (ref 8.7–10.5)
CHLORIDE SERPL-SCNC: 107 MMOL/L (ref 95–110)
CHOLEST SERPL-MCNC: 226 MG/DL (ref 120–199)
CHOLEST/HDLC SERPL: 4 {RATIO} (ref 2–5)
CO2 SERPL-SCNC: 25 MMOL/L (ref 23–29)
CREAT SERPL-MCNC: 0.9 MG/DL (ref 0.5–1.4)
DIFFERENTIAL METHOD: ABNORMAL
EOSINOPHIL # BLD AUTO: 0.1 K/UL (ref 0–0.5)
EOSINOPHIL NFR BLD: 1.4 % (ref 0–8)
ERYTHROCYTE [DISTWIDTH] IN BLOOD BY AUTOMATED COUNT: 17 % (ref 11.5–14.5)
EST. GFR  (NO RACE VARIABLE): >60 ML/MIN/1.73 M^2
FERRITIN SERPL-MCNC: 6 NG/ML (ref 20–300)
GLUCOSE SERPL-MCNC: 81 MG/DL (ref 70–110)
HCT VFR BLD AUTO: 30.4 % (ref 37–48.5)
HDLC SERPL-MCNC: 57 MG/DL (ref 40–75)
HDLC SERPL: 25.2 % (ref 20–50)
HGB BLD-MCNC: 9 G/DL (ref 12–16)
IMM GRANULOCYTES # BLD AUTO: 0.01 K/UL (ref 0–0.04)
IMM GRANULOCYTES NFR BLD AUTO: 0.2 % (ref 0–0.5)
IRON SERPL-MCNC: 24 UG/DL (ref 30–160)
LDLC SERPL CALC-MCNC: 144.8 MG/DL (ref 63–159)
LYMPHOCYTES # BLD AUTO: 1.6 K/UL (ref 1–4.8)
LYMPHOCYTES NFR BLD: 28.7 % (ref 18–48)
MCH RBC QN AUTO: 22.6 PG (ref 27–31)
MCHC RBC AUTO-ENTMCNC: 29.6 G/DL (ref 32–36)
MCV RBC AUTO: 76 FL (ref 82–98)
MONOCYTES # BLD AUTO: 0.6 K/UL (ref 0.3–1)
MONOCYTES NFR BLD: 10 % (ref 4–15)
NEUTROPHILS # BLD AUTO: 3.4 K/UL (ref 1.8–7.7)
NEUTROPHILS NFR BLD: 59 % (ref 38–73)
NONHDLC SERPL-MCNC: 169 MG/DL
NRBC BLD-RTO: 0 /100 WBC
PLATELET # BLD AUTO: 440 K/UL (ref 150–450)
PMV BLD AUTO: 10.1 FL (ref 9.2–12.9)
POTASSIUM SERPL-SCNC: 4.3 MMOL/L (ref 3.5–5.1)
PROT SERPL-MCNC: 6.6 G/DL (ref 6–8.4)
RBC # BLD AUTO: 3.98 M/UL (ref 4–5.4)
SATURATED IRON: 5 % (ref 20–50)
SODIUM SERPL-SCNC: 140 MMOL/L (ref 136–145)
TOTAL IRON BINDING CAPACITY: 465 UG/DL (ref 250–450)
TRANSFERRIN SERPL-MCNC: 314 MG/DL (ref 200–375)
TRIGL SERPL-MCNC: 121 MG/DL (ref 30–150)
WBC # BLD AUTO: 5.71 K/UL (ref 3.9–12.7)

## 2023-01-30 PROCEDURE — 3080F DIAST BP >= 90 MM HG: CPT | Mod: CPTII,S$GLB,, | Performed by: PHYSICIAN ASSISTANT

## 2023-01-30 PROCEDURE — 99999 PR PBB SHADOW E&M-EST. PATIENT-LVL IV: ICD-10-PCS | Mod: PBBFAC,,, | Performed by: PHYSICIAN ASSISTANT

## 2023-01-30 PROCEDURE — 99396 PREV VISIT EST AGE 40-64: CPT | Mod: S$GLB,,, | Performed by: PHYSICIAN ASSISTANT

## 2023-01-30 PROCEDURE — 36415 COLL VENOUS BLD VENIPUNCTURE: CPT | Performed by: PHYSICIAN ASSISTANT

## 2023-01-30 PROCEDURE — 85025 COMPLETE CBC W/AUTO DIFF WBC: CPT | Performed by: PHYSICIAN ASSISTANT

## 2023-01-30 PROCEDURE — 99396 PR PREVENTIVE VISIT,EST,40-64: ICD-10-PCS | Mod: S$GLB,,, | Performed by: PHYSICIAN ASSISTANT

## 2023-01-30 PROCEDURE — 99999 PR PBB SHADOW E&M-EST. PATIENT-LVL IV: CPT | Mod: PBBFAC,,, | Performed by: PHYSICIAN ASSISTANT

## 2023-01-30 PROCEDURE — 1160F RVW MEDS BY RX/DR IN RCRD: CPT | Mod: CPTII,S$GLB,, | Performed by: PHYSICIAN ASSISTANT

## 2023-01-30 PROCEDURE — 80053 COMPREHEN METABOLIC PANEL: CPT | Performed by: PHYSICIAN ASSISTANT

## 2023-01-30 PROCEDURE — 82728 ASSAY OF FERRITIN: CPT | Performed by: PHYSICIAN ASSISTANT

## 2023-01-30 PROCEDURE — 3008F PR BODY MASS INDEX (BMI) DOCUMENTED: ICD-10-PCS | Mod: CPTII,S$GLB,, | Performed by: PHYSICIAN ASSISTANT

## 2023-01-30 PROCEDURE — 1159F MED LIST DOCD IN RCRD: CPT | Mod: CPTII,S$GLB,, | Performed by: PHYSICIAN ASSISTANT

## 2023-01-30 PROCEDURE — 80061 LIPID PANEL: CPT | Performed by: PHYSICIAN ASSISTANT

## 2023-01-30 PROCEDURE — 1160F PR REVIEW ALL MEDS BY PRESCRIBER/CLIN PHARMACIST DOCUMENTED: ICD-10-PCS | Mod: CPTII,S$GLB,, | Performed by: PHYSICIAN ASSISTANT

## 2023-01-30 PROCEDURE — 3080F PR MOST RECENT DIASTOLIC BLOOD PRESSURE >= 90 MM HG: ICD-10-PCS | Mod: CPTII,S$GLB,, | Performed by: PHYSICIAN ASSISTANT

## 2023-01-30 PROCEDURE — 3008F BODY MASS INDEX DOCD: CPT | Mod: CPTII,S$GLB,, | Performed by: PHYSICIAN ASSISTANT

## 2023-01-30 PROCEDURE — 1159F PR MEDICATION LIST DOCUMENTED IN MEDICAL RECORD: ICD-10-PCS | Mod: CPTII,S$GLB,, | Performed by: PHYSICIAN ASSISTANT

## 2023-01-30 PROCEDURE — 82306 VITAMIN D 25 HYDROXY: CPT | Performed by: PHYSICIAN ASSISTANT

## 2023-01-30 PROCEDURE — 3075F PR MOST RECENT SYSTOLIC BLOOD PRESS GE 130-139MM HG: ICD-10-PCS | Mod: CPTII,S$GLB,, | Performed by: PHYSICIAN ASSISTANT

## 2023-01-30 PROCEDURE — 3075F SYST BP GE 130 - 139MM HG: CPT | Mod: CPTII,S$GLB,, | Performed by: PHYSICIAN ASSISTANT

## 2023-01-30 PROCEDURE — 84466 ASSAY OF TRANSFERRIN: CPT | Performed by: PHYSICIAN ASSISTANT

## 2023-01-30 RX ORDER — LOSARTAN POTASSIUM AND HYDROCHLOROTHIAZIDE 25; 100 MG/1; MG/1
1 TABLET ORAL DAILY
Qty: 90 TABLET | Refills: 3 | Status: SHIPPED | OUTPATIENT
Start: 2023-01-30 | End: 2024-03-28 | Stop reason: SDUPTHER

## 2023-01-30 NOTE — PROGRESS NOTES
Subjective:      Patient ID: Dana Winter is a 52 y.o. female.    Chief Complaint: Annual Exam    HPI  Pt has been off all medications for months. Ran out of her meds. Missed her follow up last year.   Mood is good.   Occasional headache and fatigue. SOB with exertion. Denies any exercise. Admits that the sob is deconditioning. Also has been off iron supplement, need to recheck for anemia.   GYN scheduled and mammogram scheduled.   Doesn't check her blood pressure at home.     Patient Active Problem List   Diagnosis    Essential hypertension    DJD (degenerative joint disease) of cervical spine    Iron deficiency anemia due to chronic blood loss    Vitamin D deficiency    Obesity (BMI 30.0-34.9)         Current Outpatient Medications:     ergocalciferol (ERGOCALCIFEROL) 50,000 unit Cap, Take 1 capsule (50,000 Units total) by mouth twice a week., Disp: 10 capsule, Rfl: 1    ferrous sulfate (FEOSOL) 325 mg (65 mg iron) Tab tablet, Take 1 tablet (325 mg total) by mouth 2 (two) times daily., Disp: 60 tablet, Rfl: 5    losartan-hydrochlorothiazide 100-25 mg (HYZAAR) 100-25 mg per tablet, Take 1 tablet by mouth once daily., Disp: 90 tablet, Rfl: 3    Review of Systems   Constitutional:  Positive for fatigue. Negative for activity change, appetite change, chills, diaphoresis, fever and unexpected weight change.   HENT: Negative.  Negative for congestion, hearing loss, postnasal drip, rhinorrhea, sore throat, trouble swallowing and voice change.    Eyes: Negative.  Negative for visual disturbance.   Respiratory:  Positive for shortness of breath. Negative for apnea, cough, choking, chest tightness, wheezing and stridor.    Cardiovascular:  Negative for chest pain, palpitations and leg swelling.   Gastrointestinal:  Negative for abdominal distention, abdominal pain, blood in stool, constipation, diarrhea, nausea and vomiting.   Endocrine: Negative for cold intolerance, heat intolerance, polydipsia and polyuria.  "  Genitourinary: Negative.  Negative for difficulty urinating and frequency.   Musculoskeletal:  Negative for arthralgias, back pain, gait problem, joint swelling and myalgias.   Skin:  Negative for color change, pallor, rash and wound.   Neurological:  Negative for dizziness, tremors, seizures, syncope, facial asymmetry, speech difficulty, weakness, light-headedness, numbness and headaches.   Hematological:  Negative for adenopathy.   Psychiatric/Behavioral:  Negative for behavioral problems, confusion, decreased concentration, dysphoric mood, hallucinations, self-injury, sleep disturbance and suicidal ideas. The patient is not nervous/anxious and is not hyperactive.    Objective:   BP (!) 138/94 (BP Location: Left arm, Patient Position: Sitting, BP Method: Large (Automatic))   Pulse 77   Temp 97.7 °F (36.5 °C) (Tympanic)   Ht 5' 4" (1.626 m)   Wt 84.6 kg (186 lb 8.2 oz)   SpO2 97%   BMI 32.01 kg/m²     Physical Exam  Vitals reviewed.   Constitutional:       General: She is not in acute distress.     Appearance: Normal appearance. She is well-developed. She is not ill-appearing, toxic-appearing or diaphoretic.   HENT:      Head: Normocephalic and atraumatic.      Right Ear: Tympanic membrane, ear canal and external ear normal.      Left Ear: Tympanic membrane, ear canal and external ear normal.      Nose: Nose normal. No congestion.      Mouth/Throat:      Mouth: Mucous membranes are moist.      Pharynx: No oropharyngeal exudate or posterior oropharyngeal erythema.   Eyes:      Conjunctiva/sclera: Conjunctivae normal.      Pupils: Pupils are equal, round, and reactive to light.   Cardiovascular:      Rate and Rhythm: Normal rate and regular rhythm.      Heart sounds: Normal heart sounds. No murmur heard.    No friction rub. No gallop.   Pulmonary:      Effort: Pulmonary effort is normal. No respiratory distress.      Breath sounds: Normal breath sounds. No stridor. No wheezing, rhonchi or rales.   Chest:      " Chest wall: No tenderness.   Abdominal:      General: There is no distension.      Palpations: Abdomen is soft.      Tenderness: There is no abdominal tenderness.   Musculoskeletal:         General: Normal range of motion.      Cervical back: Normal range of motion and neck supple.   Lymphadenopathy:      Cervical: No cervical adenopathy.   Skin:     General: Skin is warm and dry.      Capillary Refill: Capillary refill takes less than 2 seconds.      Findings: No rash.   Neurological:      Mental Status: She is alert and oriented to person, place, and time.      Motor: No weakness.      Coordination: Coordination normal.      Gait: Gait normal.   Psychiatric:         Mood and Affect: Mood normal.         Behavior: Behavior normal.         Thought Content: Thought content normal.         Judgment: Judgment normal.       Assessment:     1. Routine health maintenance    2. Essential hypertension    3. Iron deficiency anemia due to chronic blood loss    4. Vitamin D deficiency    5. Obesity (BMI 30.0-34.9)    6. Blurred vision, bilateral      Plan:   Routine health maintenance  -     CBC Auto Differential; Future; Expected date: 01/30/2023  -     Comprehensive Metabolic Panel; Future  -     Lipid Panel; Future; Expected date: 01/30/2023    Essential hypertension  -     Comprehensive Metabolic Panel; Future  -     losartan-hydrochlorothiazide 100-25 mg (HYZAAR) 100-25 mg per tablet; Take 1 tablet by mouth once daily.  Dispense: 90 tablet; Refill: 3    Iron deficiency anemia due to chronic blood loss  -     CBC Auto Differential; Future; Expected date: 01/30/2023  -     Ferritin; Future; Expected date: 01/30/2023  -     Iron and TIBC; Future; Expected date: 01/30/2023    Vitamin D deficiency  -     Vitamin D; Future; Expected date: 01/30/2023    Obesity (BMI 30.0-34.9)    Blurred vision, bilateral  -     Ambulatory referral/consult to Optometry; Future; Expected date: 02/06/2023    -follow up with me in 2 weeks for blood  pressure recheck. Restart meds.      Follow up in about 2 weeks (around 2/13/2023), or if symptoms worsen or fail to improve.

## 2023-01-31 ENCOUNTER — PATIENT MESSAGE (OUTPATIENT)
Dept: INTERNAL MEDICINE | Facility: CLINIC | Age: 53
End: 2023-01-31
Payer: COMMERCIAL

## 2023-01-31 DIAGNOSIS — D50.9 IRON DEFICIENCY ANEMIA, UNSPECIFIED IRON DEFICIENCY ANEMIA TYPE: Primary | ICD-10-CM

## 2023-01-31 DIAGNOSIS — E55.9 VITAMIN D DEFICIENCY: ICD-10-CM

## 2023-01-31 RX ORDER — FERROUS SULFATE 325(65) MG
325 TABLET, DELAYED RELEASE (ENTERIC COATED) ORAL 2 TIMES DAILY
Qty: 180 TABLET | Refills: 3 | Status: SHIPPED | OUTPATIENT
Start: 2023-01-31 | End: 2023-05-01

## 2023-01-31 RX ORDER — ERGOCALCIFEROL 1.25 MG/1
50000 CAPSULE ORAL
Qty: 12 CAPSULE | Refills: 3 | Status: SHIPPED | OUTPATIENT
Start: 2023-01-31 | End: 2023-05-01

## 2023-02-06 ENCOUNTER — OFFICE VISIT (OUTPATIENT)
Dept: OPHTHALMOLOGY | Facility: CLINIC | Age: 53
End: 2023-02-06
Payer: COMMERCIAL

## 2023-02-06 DIAGNOSIS — H52.223 REGULAR ASTIGMATISM WITH PRESBYOPIA, BILATERAL: Primary | ICD-10-CM

## 2023-02-06 DIAGNOSIS — H53.8 BLURRED VISION, BILATERAL: ICD-10-CM

## 2023-02-06 DIAGNOSIS — H25.13 NUCLEAR SCLEROSIS OF BOTH EYES: ICD-10-CM

## 2023-02-06 DIAGNOSIS — H52.4 REGULAR ASTIGMATISM WITH PRESBYOPIA, BILATERAL: Primary | ICD-10-CM

## 2023-02-06 PROCEDURE — 99999 PR PBB SHADOW E&M-EST. PATIENT-LVL III: ICD-10-PCS | Mod: PBBFAC,,, | Performed by: OPTOMETRIST

## 2023-02-06 PROCEDURE — 1160F RVW MEDS BY RX/DR IN RCRD: CPT | Mod: CPTII,S$GLB,, | Performed by: OPTOMETRIST

## 2023-02-06 PROCEDURE — 92004 COMPRE OPH EXAM NEW PT 1/>: CPT | Mod: S$GLB,,, | Performed by: OPTOMETRIST

## 2023-02-06 PROCEDURE — 92015 DETERMINE REFRACTIVE STATE: CPT | Mod: S$GLB,,, | Performed by: OPTOMETRIST

## 2023-02-06 PROCEDURE — 1159F MED LIST DOCD IN RCRD: CPT | Mod: CPTII,S$GLB,, | Performed by: OPTOMETRIST

## 2023-02-06 PROCEDURE — 1159F PR MEDICATION LIST DOCUMENTED IN MEDICAL RECORD: ICD-10-PCS | Mod: CPTII,S$GLB,, | Performed by: OPTOMETRIST

## 2023-02-06 PROCEDURE — 99999 PR PBB SHADOW E&M-EST. PATIENT-LVL III: CPT | Mod: PBBFAC,,, | Performed by: OPTOMETRIST

## 2023-02-06 PROCEDURE — 1160F PR REVIEW ALL MEDS BY PRESCRIBER/CLIN PHARMACIST DOCUMENTED: ICD-10-PCS | Mod: CPTII,S$GLB,, | Performed by: OPTOMETRIST

## 2023-02-06 PROCEDURE — 92004 PR EYE EXAM, NEW PATIENT,COMPREHESV: ICD-10-PCS | Mod: S$GLB,,, | Performed by: OPTOMETRIST

## 2023-02-06 PROCEDURE — 92015 PR REFRACTION: ICD-10-PCS | Mod: S$GLB,,, | Performed by: OPTOMETRIST

## 2023-02-06 NOTE — PROGRESS NOTES
HPI     Annual Exam            Comments: New patient to [facility] license of [facility]  Patient here for routine eye exam           Comments    Vision changes since last eye exam?: Yes, near      Any eye pain today: None    Other ocular symptoms: None noticed    Interested in contact lens fitting today? No    Patient wears otc readers +2.25             Last edited by Ruchi Whitney MA on 2/6/2023 12:55 PM.            Assessment /Plan     For exam results, see Encounter Report.    Regular astigmatism with presbyopia, bilateral  Eyeglass Final Rx       Eyeglass Final Rx         Sphere Cylinder Axis Add    Right +0.25 +0.75 005 +2.00    Left -0.25 +0.75 038 +2.00      Type: PAL    Expiration Date: 2/6/2024                   Nuclear sclerosis of both eyes  Cataracts are not visually significant and not affecting activities of daily living. Annual observation is recommended at this time. Patient to call or RTC with any significant change in vision prior to next visit.     RTC 1 yr for dilated eye exam or sooner if any changes to vision.   Discussed above and answered questions.

## 2023-02-14 ENCOUNTER — OFFICE VISIT (OUTPATIENT)
Dept: OBSTETRICS AND GYNECOLOGY | Facility: CLINIC | Age: 53
End: 2023-02-14
Payer: COMMERCIAL

## 2023-02-14 VITALS
WEIGHT: 182.56 LBS | HEIGHT: 64 IN | DIASTOLIC BLOOD PRESSURE: 78 MMHG | BODY MASS INDEX: 31.17 KG/M2 | SYSTOLIC BLOOD PRESSURE: 134 MMHG

## 2023-02-14 DIAGNOSIS — D21.9 FIBROIDS: ICD-10-CM

## 2023-02-14 DIAGNOSIS — K64.9 HEMORRHOIDS, UNSPECIFIED HEMORRHOID TYPE: ICD-10-CM

## 2023-02-14 DIAGNOSIS — Z01.419 ENCOUNTER FOR GYNECOLOGICAL EXAMINATION WITHOUT ABNORMAL FINDING: Primary | ICD-10-CM

## 2023-02-14 PROCEDURE — 3008F PR BODY MASS INDEX (BMI) DOCUMENTED: ICD-10-PCS | Mod: CPTII,S$GLB,, | Performed by: NURSE PRACTITIONER

## 2023-02-14 PROCEDURE — 99396 PREV VISIT EST AGE 40-64: CPT | Mod: S$GLB,,, | Performed by: NURSE PRACTITIONER

## 2023-02-14 PROCEDURE — 88175 CYTOPATH C/V AUTO FLUID REDO: CPT | Performed by: NURSE PRACTITIONER

## 2023-02-14 PROCEDURE — 1160F PR REVIEW ALL MEDS BY PRESCRIBER/CLIN PHARMACIST DOCUMENTED: ICD-10-PCS | Mod: CPTII,S$GLB,, | Performed by: NURSE PRACTITIONER

## 2023-02-14 PROCEDURE — 99999 PR PBB SHADOW E&M-EST. PATIENT-LVL IV: ICD-10-PCS | Mod: PBBFAC,,, | Performed by: NURSE PRACTITIONER

## 2023-02-14 PROCEDURE — 3008F BODY MASS INDEX DOCD: CPT | Mod: CPTII,S$GLB,, | Performed by: NURSE PRACTITIONER

## 2023-02-14 PROCEDURE — 3078F DIAST BP <80 MM HG: CPT | Mod: CPTII,S$GLB,, | Performed by: NURSE PRACTITIONER

## 2023-02-14 PROCEDURE — 3078F PR MOST RECENT DIASTOLIC BLOOD PRESSURE < 80 MM HG: ICD-10-PCS | Mod: CPTII,S$GLB,, | Performed by: NURSE PRACTITIONER

## 2023-02-14 PROCEDURE — 1160F RVW MEDS BY RX/DR IN RCRD: CPT | Mod: CPTII,S$GLB,, | Performed by: NURSE PRACTITIONER

## 2023-02-14 PROCEDURE — 1159F MED LIST DOCD IN RCRD: CPT | Mod: CPTII,S$GLB,, | Performed by: NURSE PRACTITIONER

## 2023-02-14 PROCEDURE — 1159F PR MEDICATION LIST DOCUMENTED IN MEDICAL RECORD: ICD-10-PCS | Mod: CPTII,S$GLB,, | Performed by: NURSE PRACTITIONER

## 2023-02-14 PROCEDURE — 3075F PR MOST RECENT SYSTOLIC BLOOD PRESS GE 130-139MM HG: ICD-10-PCS | Mod: CPTII,S$GLB,, | Performed by: NURSE PRACTITIONER

## 2023-02-14 PROCEDURE — 3075F SYST BP GE 130 - 139MM HG: CPT | Mod: CPTII,S$GLB,, | Performed by: NURSE PRACTITIONER

## 2023-02-14 PROCEDURE — 99999 PR PBB SHADOW E&M-EST. PATIENT-LVL IV: CPT | Mod: PBBFAC,,, | Performed by: NURSE PRACTITIONER

## 2023-02-14 PROCEDURE — 99396 PR PREVENTIVE VISIT,EST,40-64: ICD-10-PCS | Mod: S$GLB,,, | Performed by: NURSE PRACTITIONER

## 2023-02-14 PROCEDURE — 87624 HPV HI-RISK TYP POOLED RSLT: CPT | Performed by: NURSE PRACTITIONER

## 2023-02-14 NOTE — PROGRESS NOTES
"CC: Well woman exam    Dana Winter is a 52 y.o. female  presents for well woman exam.  LMP: Patient's last menstrual period was 2023..    Some spotting after cycle but only for few days - history of fibroids  Mmg scheduled  Due for pap collection     Past Medical History:   Diagnosis Date    DJD (degenerative joint disease) of cervical spine     on PT    Hypertension      Past Surgical History:   Procedure Laterality Date    APPENDECTOMY      COLONOSCOPY N/A 2021    Procedure: COLONOSCOPY;  Surgeon: Michelle Harry MD;  Location: [facility];  Service: Endoscopy;  Laterality: N/A;    LAPAROSCOPIC APPENDECTOMY      TUBAL LIGATION       Social History     Socioeconomic History    Marital status:     Number of children: 4   Occupational History    Occupation: [employer]     Employer: [employer])   Tobacco Use    Smoking status: Never    Smokeless tobacco: Never   Substance and Sexual Activity    Alcohol use: No    Drug use: No    Sexual activity: Yes     Partners: Male     Birth control/protection: Surgical     Comment: Tubal     Family History   Problem Relation Age of Onset    Hypertension Father     Stroke Father     Heart attack Father      OB History          4    Para   4    Term   4            AB        Living   4         SAB        IAB        Ectopic        Multiple        Live Births                     /78 (BP Location: Left arm, Patient Position: Sitting, BP Method: Medium (Manual))   Ht 5' 4" (1.626 m)   Wt 82.8 kg (182 lb 8.7 oz)   LMP 2023   BMI 31.33 kg/m²       ROS:  GENERAL: Denies weight gain or weight loss. Feeling well overall.   SKIN: Denies rash or lesions.   HEAD: Denies head injury or headache.   NODES: Denies enlarged lymph nodes.   CHEST: Denies chest pain or shortness of breath.   CARDIOVASCULAR: Denies palpitations or left sided chest pain.   ABDOMEN: No abdominal pain, constipation, diarrhea, nausea, vomiting or rectal bleeding. "   URINARY: No frequency, dysuria, hematuria, or burning on urination.  REPRODUCTIVE: See HPI.   BREASTS: The patient performs breast self-examination and denies pain, lumps, or nipple discharge.   HEMATOLOGIC: No easy bruisability or excessive bleeding.   MUSCULOSKELETAL: Denies joint pain or swelling.   NEUROLOGIC: Denies syncope or weakness.   PSYCHIATRIC: Denies depression, anxiety or mood swings.    PHYSICAL EXAM:  APPEARANCE: Well nourished, well developed, in no acute distress.  AFFECT: WNL, alert and oriented x 3  SKIN: No acne or hirsutism  NECK: Neck symmetric without masses or thyromegaly  NODES: No inguinal, cervical, axillary, or femoral lymph node enlargement  CHEST: Good respiratory effect  ABDOMEN: Soft.  No tenderness or masses.  No hepatosplenomegaly.  No hernias.  BREASTS: Symmetrical, no skin changes or visible lesions.  No palpable masses, nipple discharge bilaterally.  PELVIC: Normal external genitalia without lesions.  Normal hair distribution.  Adequate perineal body, normal urethral meatus.  Vagina moist and well rugated without lesions or discharge.  Cervix pink, without lesions, discharge or tenderness.  No significant cystocele or rectocele.  Bimanual exam shows uterus to be 10-12 week size, regular, mobile and nontender.  Adnexa without masses or tenderness.    EXTREMITIES: No edema.  Physical Exam    1. Encounter for gynecological examination without abnormal finding  Liquid-Based Pap Smear, Screening    HPV High Risk Genotypes, PCR      2. Fibroids        3. Hemorrhoids, unspecified hemorrhoid type  Ambulatory referral/consult to Colorectal Surgery       AND PLAN:    Patient was counseled today on A.C.S. Pap guidelines and recommendations for yearly pelvic exams, mammograms and monthly self breast exams; to see her PCP for other health maintenance.

## 2023-02-22 LAB
FINAL PATHOLOGIC DIAGNOSIS: NORMAL
HPV HR 12 DNA SPEC QL NAA+PROBE: NEGATIVE
HPV16 AG SPEC QL: NEGATIVE
HPV18 DNA SPEC QL NAA+PROBE: NEGATIVE
Lab: NORMAL

## 2023-03-02 ENCOUNTER — HOSPITAL ENCOUNTER (OUTPATIENT)
Dept: RADIOLOGY | Facility: HOSPITAL | Age: 53
Discharge: HOME | End: 2023-03-02
Attending: FAMILY MEDICINE
Payer: COMMERCIAL

## 2023-03-02 DIAGNOSIS — Z12.31 OTHER SCREENING MAMMOGRAM: ICD-10-CM

## 2023-03-02 PROCEDURE — 77067 SCR MAMMO BI INCL CAD: CPT | Mod: 26,,, | Performed by: RADIOLOGY

## 2023-03-02 PROCEDURE — 77067 SCR MAMMO BI INCL CAD: CPT | Mod: TC

## 2023-03-02 PROCEDURE — 77067 MAMMO DIGITAL SCREENING BILAT WITH TOMO: ICD-10-PCS | Mod: 26,,, | Performed by: RADIOLOGY

## 2023-03-02 PROCEDURE — 77063 BREAST TOMOSYNTHESIS BI: CPT | Mod: 26,,, | Performed by: RADIOLOGY

## 2023-03-02 PROCEDURE — 77063 MAMMO DIGITAL SCREENING BILAT WITH TOMO: ICD-10-PCS | Mod: 26,,, | Performed by: RADIOLOGY

## 2023-03-13 ENCOUNTER — PATIENT MESSAGE (OUTPATIENT)
Dept: PAIN MEDICINE | Facility: CLINIC | Age: 53
End: 2023-03-13
Payer: COMMERCIAL

## 2023-04-17 ENCOUNTER — OFFICE VISIT (OUTPATIENT)
Dept: SURGERY | Facility: CLINIC | Age: 53
End: 2023-04-17
Payer: COMMERCIAL

## 2023-04-17 VITALS
DIASTOLIC BLOOD PRESSURE: 79 MMHG | BODY MASS INDEX: 30.55 KG/M2 | SYSTOLIC BLOOD PRESSURE: 122 MMHG | HEART RATE: 87 BPM | TEMPERATURE: 98 F | WEIGHT: 178 LBS | OXYGEN SATURATION: 97 %

## 2023-04-17 DIAGNOSIS — K64.9 HEMORRHOIDS, UNSPECIFIED HEMORRHOID TYPE: ICD-10-CM

## 2023-04-17 DIAGNOSIS — K64.4 EXTERNAL HEMORRHOID: Primary | ICD-10-CM

## 2023-04-17 DIAGNOSIS — K64.8 INTERNAL HEMORRHOID: ICD-10-CM

## 2023-04-17 PROCEDURE — 99204 OFFICE O/P NEW MOD 45 MIN: CPT | Mod: 25,S$GLB,, | Performed by: COLON & RECTAL SURGERY

## 2023-04-17 PROCEDURE — 3074F PR MOST RECENT SYSTOLIC BLOOD PRESSURE < 130 MM HG: ICD-10-PCS | Mod: CPTII,S$GLB,, | Performed by: COLON & RECTAL SURGERY

## 2023-04-17 PROCEDURE — 99204 PR OFFICE/OUTPT VISIT, NEW, LEVL IV, 45-59 MIN: ICD-10-PCS | Mod: 25,S$GLB,, | Performed by: COLON & RECTAL SURGERY

## 2023-04-17 PROCEDURE — 3008F PR BODY MASS INDEX (BMI) DOCUMENTED: ICD-10-PCS | Mod: CPTII,S$GLB,, | Performed by: COLON & RECTAL SURGERY

## 2023-04-17 PROCEDURE — 3078F DIAST BP <80 MM HG: CPT | Mod: CPTII,S$GLB,, | Performed by: COLON & RECTAL SURGERY

## 2023-04-17 PROCEDURE — 3008F BODY MASS INDEX DOCD: CPT | Mod: CPTII,S$GLB,, | Performed by: COLON & RECTAL SURGERY

## 2023-04-17 PROCEDURE — 46600 PR DIAG2STIC A2SCOPY: ICD-10-PCS | Mod: S$GLB,,, | Performed by: COLON & RECTAL SURGERY

## 2023-04-17 PROCEDURE — 46600 DIAGNOSTIC ANOSCOPY SPX: CPT | Mod: S$GLB,,, | Performed by: COLON & RECTAL SURGERY

## 2023-04-17 PROCEDURE — 1159F MED LIST DOCD IN RCRD: CPT | Mod: CPTII,S$GLB,, | Performed by: COLON & RECTAL SURGERY

## 2023-04-17 PROCEDURE — 99999 PR PBB SHADOW E&M-EST. PATIENT-LVL III: CPT | Mod: PBBFAC,,, | Performed by: COLON & RECTAL SURGERY

## 2023-04-17 PROCEDURE — 1159F PR MEDICATION LIST DOCUMENTED IN MEDICAL RECORD: ICD-10-PCS | Mod: CPTII,S$GLB,, | Performed by: COLON & RECTAL SURGERY

## 2023-04-17 PROCEDURE — 3078F PR MOST RECENT DIASTOLIC BLOOD PRESSURE < 80 MM HG: ICD-10-PCS | Mod: CPTII,S$GLB,, | Performed by: COLON & RECTAL SURGERY

## 2023-04-17 PROCEDURE — 99999 PR PBB SHADOW E&M-EST. PATIENT-LVL III: ICD-10-PCS | Mod: PBBFAC,,, | Performed by: COLON & RECTAL SURGERY

## 2023-04-17 PROCEDURE — 3074F SYST BP LT 130 MM HG: CPT | Mod: CPTII,S$GLB,, | Performed by: COLON & RECTAL SURGERY

## 2023-04-17 NOTE — PROGRESS NOTES
History & Physical    SUBJECTIVE:     Chief Complaint   Patient presents with    Hemorrhoids   Ref: Tracy Bailey NP    History of Present Illness:  Patient is a 53 y.o. female presents for evaluation of hemorrhoidal disease.  Patient states she has had external hemorrhoids since the birth of her children 20 years ago.  Reports she does not have any pain with bowel movements or anorectal pain but does have intermittent bleeding when wiping after bowel movements as well some intermittent swelling in the area as well as trouble keeping clean after bowel movements.  She last underwent a colonoscopy in 2021 where 1 tubular adenoma was removed.  She has no family history of colorectal cancer.  She has a bowel movement every other day, stools intermittently hard, she intermittently strains, spends 10 minutes on the toilet per bowel movement, drinks less than 64 oz of water per day, does not take fiber supplements, does not take stool softeners, and only uses toilet paper to wipe after bowel movements.  She denies any fever, chills, nausea, vomiting or melena.    Review of patient's allergies indicates:  No Known Allergies    Current Outpatient Medications   Medication Sig Dispense Refill    ergocalciferol (ERGOCALCIFEROL) 50,000 unit Cap Take 1 capsule (50,000 Units total) by mouth every 7 days. 12 capsule 3    ferrous sulfate 325 (65 FE) MG EC tablet Take 1 tablet (325 mg total) by mouth 2 (two) times daily. 180 tablet 3    losartan-hydrochlorothiazide 100-25 mg (HYZAAR) 100-25 mg per tablet Take 1 tablet by mouth once daily. 90 tablet 3     No current facility-administered medications for this visit.       Past Medical History:   Diagnosis Date    DJD (degenerative joint disease) of cervical spine     on PT    Hypertension      Past Surgical History:   Procedure Laterality Date    APPENDECTOMY      COLONOSCOPY N/A 7/23/2021    Procedure: COLONOSCOPY;  Surgeon: Michelle Harry MD;  Location: [facility];  Service:  Endoscopy;  Laterality: N/A;    LAPAROSCOPIC APPENDECTOMY      TUBAL LIGATION       Family History   Problem Relation Age of Onset    Hypertension Father     Stroke Father     Heart attack Father      Social History     Tobacco Use    Smoking status: Never    Smokeless tobacco: Never   Substance Use Topics    Alcohol use: No    Drug use: No        Review of Systems:  Review of Systems   Constitutional:  Negative for activity change, appetite change, chills, fatigue, fever and unexpected weight change.   HENT:  Negative for congestion, ear pain, sore throat and trouble swallowing.    Eyes:  Negative for pain, redness and itching.   Respiratory:  Negative for cough, shortness of breath and wheezing.    Cardiovascular:  Negative for chest pain, palpitations and leg swelling.   Gastrointestinal:  Positive for anal bleeding and constipation. Negative for abdominal distention, abdominal pain, diarrhea, nausea, rectal pain and vomiting.   Endocrine: Negative for cold intolerance, heat intolerance and polyuria.   Genitourinary:  Negative for dysuria, flank pain, frequency and hematuria.   Musculoskeletal:  Negative for gait problem, joint swelling and neck pain.   Skin:  Negative for color change, rash and wound.   Allergic/Immunologic: Negative for environmental allergies and immunocompromised state.   Neurological:  Negative for dizziness, speech difficulty, weakness and numbness.   Psychiatric/Behavioral:  Negative for agitation, confusion and hallucinations.      OBJECTIVE:     Vital Signs (Most Recent)  Temp: 98.2 °F (36.8 °C) (04/17/23 0808)  Pulse: 87 (04/17/23 0808)  BP: 122/79 (04/17/23 0808)  SpO2: 97 % (04/17/23 0808)     80.7 kg (178 lb)     Physical Exam:  Physical Exam  Exam conducted with a chaperone present.   Constitutional:       Appearance: She is well-developed.   HENT:      Head: Normocephalic and atraumatic.   Eyes:      Conjunctiva/sclera: Conjunctivae normal.   Neck:      Thyroid: No thyromegaly.    Cardiovascular:      Rate and Rhythm: Normal rate and regular rhythm.   Pulmonary:      Effort: Pulmonary effort is normal. No respiratory distress.   Abdominal:      General: There is no distension.      Palpations: Abdomen is soft. There is no mass.      Tenderness: There is no abdominal tenderness.   Genitourinary:     Comments: Anorectal: large R posterior external hemorrhoid which is soft, nontender, nonthrombosed; small R anterior and left lateral external hemorrhoids; CAMERON with good tone, no blood, no palpable masses or defects  Musculoskeletal:         General: No tenderness. Normal range of motion.      Cervical back: Normal range of motion.   Skin:     General: Skin is warm and dry.      Capillary Refill: Capillary refill takes less than 2 seconds.      Findings: No rash.   Neurological:      Mental Status: She is alert and oriented to person, place, and time.     Anoscopy Procedure Note    Pre-procedure diagnosis: External hemorrhoids    Post-procedure diagnosis: External and internal hemorrhoids    Procedure: Anoscopy    Surgeon: Tomy Ruiz MD    Assistant: Kirsten Waite RN    Specimen: none    Findings:  Anoscope inserted and all 4 quadrants examined.  Mildly enlarged left lateral, right posterior and right anterior internal hemorrhoidal columns without evidence of recent bleeding or prolapse.  No other large lesions or defects appreciated.    Patient tolerated procedure well.     Laboratory  Lab Results   Component Value Date    WBC 5.71 01/30/2023    HGB 9.0 (L) 01/30/2023    HCT 30.4 (L) 01/30/2023     01/30/2023    CHOL 226 (H) 01/30/2023    TRIG 121 01/30/2023    HDL 57 01/30/2023    ALT 8 (L) 01/30/2023    AST 17 01/30/2023     01/30/2023    K 4.3 01/30/2023     01/30/2023    CREATININE 0.9 01/30/2023    BUN 12 01/30/2023    CO2 25 01/30/2023    TSH 0.965 06/07/2021    HGBA1C 5.4 06/07/2021       Diagnostic Results:  Colonoscopy 2021: reviewed    ASSESSMENT/PLAN:      52yo F with large external hemorrhoid and internal hemorrhoids present    - Long discussion with the patient regarding hemorrhoidal disease and management.  We discussed treatment options including changing bowel habits, banding and excisional hemorrhoidectomy.  Discussed changing her bowel habits and excisional hemorrhoidectomy would likely be her best course of action given that the hemorrhoid that is bother her the most is the large external hemorrhoid is likely not going to strike to the point where it is not noticeable with the change in bowel habits as not amenable to hemorrhoid banding.  She voiced understanding of this and would like to proceed with excisional hemorrhoidectomy in the future but will call us back to discuss a timing of this.  - Discussed that a minimum I would recommend behavioral, lifestyle and medication modifications to improve bowel habits. Usual bowel management handout given to patient. This includes a stool softener twice per day, fiber powder supplementation daily, drinking at least 64oz of water/day, avoiding straining with bowel movements, spending less than 5 min on toilet per bowel movement, eating a high fiber diet, using miralax as needed to achieve a bowel movement daily and using wet wipes to wipe after bowel movements when irritated.   - plan for excisional hemorrhoidectomy in future  - All risks, benefits and alternatives fully explained to patient.  Risks include, but are not limited to, bleeding, infection, fecal incontinence, damage to the sphincter muscles, postoperative abscess, postoperative pain, urinary incontinence, urinary retention, perioperative MI, CVA and death.  All questions appropriately answered to patient's satisfaction.  Consent signed and placed on chart.  - 1 enema prep  - RTC PRN or postop    Tomy Ruiz MD  Colon and Rectal Surgery  [facility]

## 2023-07-05 ENCOUNTER — OFFICE VISIT (OUTPATIENT)
Dept: INTERNAL MEDICINE | Facility: CLINIC | Age: 53
End: 2023-07-05
Payer: COMMERCIAL

## 2023-07-05 VITALS
OXYGEN SATURATION: 100 % | BODY MASS INDEX: 31.47 KG/M2 | HEART RATE: 68 BPM | WEIGHT: 184.31 LBS | HEIGHT: 64 IN | SYSTOLIC BLOOD PRESSURE: 140 MMHG | TEMPERATURE: 99 F | DIASTOLIC BLOOD PRESSURE: 92 MMHG

## 2023-07-05 DIAGNOSIS — R05.9 COUGH, UNSPECIFIED TYPE: Primary | ICD-10-CM

## 2023-07-05 PROCEDURE — 99999 PR PBB SHADOW E&M-EST. PATIENT-LVL III: CPT | Mod: PBBFAC,,, | Performed by: NURSE PRACTITIONER

## 2023-07-05 PROCEDURE — 99213 PR OFFICE/OUTPT VISIT, EST, LEVL III, 20-29 MIN: ICD-10-PCS | Mod: S$GLB,,, | Performed by: NURSE PRACTITIONER

## 2023-07-05 PROCEDURE — 99999 PR PBB SHADOW E&M-EST. PATIENT-LVL III: ICD-10-PCS | Mod: PBBFAC,,, | Performed by: NURSE PRACTITIONER

## 2023-07-05 PROCEDURE — 3080F PR MOST RECENT DIASTOLIC BLOOD PRESSURE >= 90 MM HG: ICD-10-PCS | Mod: CPTII,S$GLB,, | Performed by: NURSE PRACTITIONER

## 2023-07-05 PROCEDURE — 3077F PR MOST RECENT SYSTOLIC BLOOD PRESSURE >= 140 MM HG: ICD-10-PCS | Mod: CPTII,S$GLB,, | Performed by: NURSE PRACTITIONER

## 2023-07-05 PROCEDURE — 3008F BODY MASS INDEX DOCD: CPT | Mod: CPTII,S$GLB,, | Performed by: NURSE PRACTITIONER

## 2023-07-05 PROCEDURE — 3008F PR BODY MASS INDEX (BMI) DOCUMENTED: ICD-10-PCS | Mod: CPTII,S$GLB,, | Performed by: NURSE PRACTITIONER

## 2023-07-05 PROCEDURE — 1159F PR MEDICATION LIST DOCUMENTED IN MEDICAL RECORD: ICD-10-PCS | Mod: CPTII,S$GLB,, | Performed by: NURSE PRACTITIONER

## 2023-07-05 PROCEDURE — 3077F SYST BP >= 140 MM HG: CPT | Mod: CPTII,S$GLB,, | Performed by: NURSE PRACTITIONER

## 2023-07-05 PROCEDURE — 99213 OFFICE O/P EST LOW 20 MIN: CPT | Mod: S$GLB,,, | Performed by: NURSE PRACTITIONER

## 2023-07-05 PROCEDURE — 1159F MED LIST DOCD IN RCRD: CPT | Mod: CPTII,S$GLB,, | Performed by: NURSE PRACTITIONER

## 2023-07-05 PROCEDURE — 3080F DIAST BP >= 90 MM HG: CPT | Mod: CPTII,S$GLB,, | Performed by: NURSE PRACTITIONER

## 2023-07-05 RX ORDER — FERROUS SULFATE TAB 325 MG (65 MG ELEMENTAL FE) 325 (65 FE) MG
TAB ORAL 2 TIMES DAILY
COMMUNITY
Start: 2023-05-11 | End: 2024-02-02 | Stop reason: SDUPTHER

## 2023-07-05 RX ORDER — BENZONATATE 200 MG/1
200 CAPSULE ORAL 2 TIMES DAILY PRN
Qty: 20 CAPSULE | Refills: 0 | Status: SHIPPED | OUTPATIENT
Start: 2023-07-05 | End: 2023-07-15

## 2023-07-05 RX ORDER — ERGOCALCIFEROL 1.25 MG/1
50000 CAPSULE ORAL
COMMUNITY
Start: 2023-06-09 | End: 2024-03-26

## 2023-07-05 RX ORDER — PROMETHAZINE HYDROCHLORIDE AND DEXTROMETHORPHAN HYDROBROMIDE 6.25; 15 MG/5ML; MG/5ML
5 SYRUP ORAL NIGHTLY PRN
Qty: 180 ML | Refills: 0 | Status: SHIPPED | OUTPATIENT
Start: 2023-07-05 | End: 2023-07-15

## 2023-07-05 NOTE — PROGRESS NOTES
Subjective:       Patient ID: Dana Winter is a 53 y.o. female.    Chief Complaint: Cough (Non-productive x2 weeks)    Cough  Pertinent negatives include no chest pain, chills, ear pain, fever, headaches, postnasal drip, rhinorrhea, sore throat, shortness of breath or wheezing.     Pt here for above  No fever, sob, or cp  Not taking any otc meds  Getting better     Past Medical History:   Diagnosis Date    DJD (degenerative joint disease) of cervical spine     on PT    Hypertension      Past Surgical History:   Procedure Laterality Date    APPENDECTOMY      COLONOSCOPY N/A 7/23/2021    Procedure: COLONOSCOPY;  Surgeon: Michelle Harry MD;  Location: [facility];  Service: Endoscopy;  Laterality: N/A;    LAPAROSCOPIC APPENDECTOMY      TUBAL LIGATION       Social History     Socioeconomic History    Marital status:     Number of children: 4   Occupational History    Occupation: [employer]     Employer: [employer]   Tobacco Use    Smoking status: Never    Smokeless tobacco: Never   Substance and Sexual Activity    Alcohol use: No    Drug use: No    Sexual activity: Yes     Partners: Male     Birth control/protection: Surgical     Comment: Tubal     Review of patient's allergies indicates:  No Known Allergies  Current Outpatient Medications   Medication Sig    ergocalciferol (ERGOCALCIFEROL) 50,000 unit Cap Take 50,000 Units by mouth every 7 days.    FEROSUL 325 mg (65 mg iron) Tab tablet Take by mouth 2 (two) times daily.    losartan-hydrochlorothiazide 100-25 mg (HYZAAR) 100-25 mg per tablet Take 1 tablet by mouth once daily.    benzonatate (TESSALON) 200 MG capsule Take 1 capsule (200 mg total) by mouth 2 (two) times daily as needed for Cough.    promethazine-dextromethorphan (PROMETHAZINE-DM) 6.25-15 mg/5 mL Syrp Take 5 mLs by mouth nightly as needed (cough).     No current facility-administered medications for this visit.           Review of Systems   Constitutional:  Negative for activity change,  appetite change, chills, diaphoresis, fatigue, fever and unexpected weight change.   HENT:  Negative for congestion, ear pain, postnasal drip, rhinorrhea, sinus pressure, sinus pain, sneezing, sore throat, tinnitus, trouble swallowing and voice change.    Eyes:  Negative for photophobia, pain and visual disturbance.   Respiratory:  Positive for cough. Negative for chest tightness, shortness of breath and wheezing.    Cardiovascular:  Negative for chest pain, palpitations and leg swelling.   Gastrointestinal:  Negative for abdominal distention, abdominal pain, constipation, diarrhea, nausea and vomiting.   Genitourinary:  Negative for decreased urine volume, difficulty urinating, dysuria, flank pain, frequency, hematuria and urgency.   Musculoskeletal:  Negative for arthralgias, back pain, joint swelling, neck pain and neck stiffness.   Allergic/Immunologic: Negative for immunocompromised state.   Neurological:  Negative for dizziness, tremors, seizures, syncope, facial asymmetry, speech difficulty, weakness, light-headedness, numbness and headaches.   Hematological:  Negative for adenopathy. Does not bruise/bleed easily.   Psychiatric/Behavioral:  Negative for confusion and sleep disturbance.      Objective:      Physical Exam  Vitals reviewed.   HENT:      Right Ear: Tympanic membrane normal.      Left Ear: Tympanic membrane normal.      Nose: Nose normal.      Mouth/Throat:      Mouth: Mucous membranes are moist.   Cardiovascular:      Rate and Rhythm: Normal rate and regular rhythm.      Pulses: Normal pulses.      Heart sounds: Normal heart sounds.   Pulmonary:      Effort: Pulmonary effort is normal.      Breath sounds: Normal breath sounds.   Musculoskeletal:         General: Normal range of motion.   Neurological:      Mental Status: She is alert and oriented to person, place, and time.       Assessment:     Vitals:    07/05/23 1138   BP: (!) 140/92   Pulse: 68   Temp: 98.5 °F (36.9 °C)         1. Cough,  unspecified type        Plan:   Cough, unspecified type    Other orders  -     benzonatate (TESSALON) 200 MG capsule; Take 1 capsule (200 mg total) by mouth 2 (two) times daily as needed for Cough.  Dispense: 20 capsule; Refill: 0  -     promethazine-dextromethorphan (PROMETHAZINE-DM) 6.25-15 mg/5 mL Syrp; Take 5 mLs by mouth nightly as needed (cough).  Dispense: 180 mL; Refill: 0          New medications:  Change in chronic medications:  Stop medications  Referrals  Labs/radiology-as above  Monitor   Interventions (non-medicinal)  Follow up  Time spent with patient

## 2023-11-29 ENCOUNTER — OFFICE VISIT (OUTPATIENT)
Dept: INTERNAL MEDICINE | Facility: CLINIC | Age: 53
End: 2023-11-29
Payer: COMMERCIAL

## 2023-11-29 ENCOUNTER — LAB VISIT (OUTPATIENT)
Dept: LAB | Facility: HOSPITAL | Age: 53
End: 2023-11-29
Attending: INTERNAL MEDICINE
Payer: COMMERCIAL

## 2023-11-29 VITALS
TEMPERATURE: 98 F | WEIGHT: 172.38 LBS | DIASTOLIC BLOOD PRESSURE: 72 MMHG | HEIGHT: 64 IN | HEART RATE: 115 BPM | OXYGEN SATURATION: 98 % | BODY MASS INDEX: 29.43 KG/M2 | SYSTOLIC BLOOD PRESSURE: 130 MMHG

## 2023-11-29 DIAGNOSIS — D50.0 IRON DEFICIENCY ANEMIA DUE TO CHRONIC BLOOD LOSS: ICD-10-CM

## 2023-11-29 DIAGNOSIS — T14.8XXA MUSCLE STRAIN: ICD-10-CM

## 2023-11-29 DIAGNOSIS — D50.0 IRON DEFICIENCY ANEMIA DUE TO CHRONIC BLOOD LOSS: Primary | ICD-10-CM

## 2023-11-29 LAB
BASOPHILS # BLD AUTO: 0.03 K/UL (ref 0–0.2)
BASOPHILS NFR BLD: 0.3 % (ref 0–1.9)
DIFFERENTIAL METHOD: ABNORMAL
EOSINOPHIL # BLD AUTO: 0 K/UL (ref 0–0.5)
EOSINOPHIL NFR BLD: 0 % (ref 0–8)
ERYTHROCYTE [DISTWIDTH] IN BLOOD BY AUTOMATED COUNT: 15.4 % (ref 11.5–14.5)
FERRITIN SERPL-MCNC: 74 NG/ML (ref 20–300)
HCT VFR BLD AUTO: 35.6 % (ref 37–48.5)
HGB BLD-MCNC: 11.6 G/DL (ref 12–16)
IMM GRANULOCYTES # BLD AUTO: 0.02 K/UL (ref 0–0.04)
IMM GRANULOCYTES NFR BLD AUTO: 0.2 % (ref 0–0.5)
IRON SERPL-MCNC: <10 UG/DL (ref 30–160)
LYMPHOCYTES # BLD AUTO: 0.4 K/UL (ref 1–4.8)
LYMPHOCYTES NFR BLD: 4.6 % (ref 18–48)
MCH RBC QN AUTO: 25.4 PG (ref 27–31)
MCHC RBC AUTO-ENTMCNC: 32.6 G/DL (ref 32–36)
MCV RBC AUTO: 78 FL (ref 82–98)
MONOCYTES # BLD AUTO: 0.3 K/UL (ref 0.3–1)
MONOCYTES NFR BLD: 2.9 % (ref 4–15)
NEUTROPHILS # BLD AUTO: 7.9 K/UL (ref 1.8–7.7)
NEUTROPHILS NFR BLD: 92 % (ref 38–73)
NRBC BLD-RTO: 0 /100 WBC
PLATELET # BLD AUTO: 322 K/UL (ref 150–450)
PMV BLD AUTO: 10.2 FL (ref 9.2–12.9)
RBC # BLD AUTO: 4.56 M/UL (ref 4–5.4)
SATURATED IRON: ABNORMAL % (ref 20–50)
TOTAL IRON BINDING CAPACITY: 406 UG/DL (ref 250–450)
TRANSFERRIN SERPL-MCNC: 274 MG/DL (ref 200–375)
WBC # BLD AUTO: 8.63 K/UL (ref 3.9–12.7)

## 2023-11-29 PROCEDURE — 1159F PR MEDICATION LIST DOCUMENTED IN MEDICAL RECORD: ICD-10-PCS | Mod: CPTII,S$GLB,, | Performed by: INTERNAL MEDICINE

## 2023-11-29 PROCEDURE — 3008F BODY MASS INDEX DOCD: CPT | Mod: CPTII,S$GLB,, | Performed by: INTERNAL MEDICINE

## 2023-11-29 PROCEDURE — 3075F SYST BP GE 130 - 139MM HG: CPT | Mod: CPTII,S$GLB,, | Performed by: INTERNAL MEDICINE

## 2023-11-29 PROCEDURE — 83540 ASSAY OF IRON: CPT | Performed by: INTERNAL MEDICINE

## 2023-11-29 PROCEDURE — 99999 PR PBB SHADOW E&M-EST. PATIENT-LVL III: CPT | Mod: PBBFAC,,, | Performed by: INTERNAL MEDICINE

## 2023-11-29 PROCEDURE — 85025 COMPLETE CBC W/AUTO DIFF WBC: CPT | Performed by: INTERNAL MEDICINE

## 2023-11-29 PROCEDURE — 99999 PR PBB SHADOW E&M-EST. PATIENT-LVL III: ICD-10-PCS | Mod: PBBFAC,,, | Performed by: INTERNAL MEDICINE

## 2023-11-29 PROCEDURE — 99214 OFFICE O/P EST MOD 30 MIN: CPT | Mod: S$GLB,,, | Performed by: INTERNAL MEDICINE

## 2023-11-29 PROCEDURE — 99214 PR OFFICE/OUTPT VISIT, EST, LEVL IV, 30-39 MIN: ICD-10-PCS | Mod: S$GLB,,, | Performed by: INTERNAL MEDICINE

## 2023-11-29 PROCEDURE — 84466 ASSAY OF TRANSFERRIN: CPT | Performed by: INTERNAL MEDICINE

## 2023-11-29 PROCEDURE — 3075F PR MOST RECENT SYSTOLIC BLOOD PRESS GE 130-139MM HG: ICD-10-PCS | Mod: CPTII,S$GLB,, | Performed by: INTERNAL MEDICINE

## 2023-11-29 PROCEDURE — 36415 COLL VENOUS BLD VENIPUNCTURE: CPT | Performed by: INTERNAL MEDICINE

## 2023-11-29 PROCEDURE — 1159F MED LIST DOCD IN RCRD: CPT | Mod: CPTII,S$GLB,, | Performed by: INTERNAL MEDICINE

## 2023-11-29 PROCEDURE — 3078F PR MOST RECENT DIASTOLIC BLOOD PRESSURE < 80 MM HG: ICD-10-PCS | Mod: CPTII,S$GLB,, | Performed by: INTERNAL MEDICINE

## 2023-11-29 PROCEDURE — 82728 ASSAY OF FERRITIN: CPT | Performed by: INTERNAL MEDICINE

## 2023-11-29 PROCEDURE — 3008F PR BODY MASS INDEX (BMI) DOCUMENTED: ICD-10-PCS | Mod: CPTII,S$GLB,, | Performed by: INTERNAL MEDICINE

## 2023-11-29 PROCEDURE — 3078F DIAST BP <80 MM HG: CPT | Mod: CPTII,S$GLB,, | Performed by: INTERNAL MEDICINE

## 2023-11-29 RX ORDER — CYCLOBENZAPRINE HCL 10 MG
TABLET ORAL
Qty: 30 TABLET | Refills: 0 | Status: SHIPPED | OUTPATIENT
Start: 2023-11-29 | End: 2023-12-04 | Stop reason: ALTCHOICE

## 2023-11-29 RX ORDER — CYCLOBENZAPRINE HCL 10 MG
TABLET ORAL
Qty: 30 TABLET | Refills: 0 | Status: SHIPPED | OUTPATIENT
Start: 2023-11-29 | End: 2023-11-29

## 2023-11-29 RX ORDER — NAPROXEN 500 MG/1
500 TABLET ORAL 2 TIMES DAILY WITH MEALS
Qty: 6 TABLET | Refills: 0 | Status: SHIPPED | OUTPATIENT
Start: 2023-11-29 | End: 2023-12-04 | Stop reason: ALTCHOICE

## 2023-11-29 NOTE — PROGRESS NOTES
"Subjective:      Patient ID: Dana Winter is a 53 y.o. female.    Chief Complaint: Flank Pain    Flank Pain  Pertinent negatives include no chest pain or fever.       52 yo with   Patient Active Problem List   Diagnosis    Essential hypertension    DJD (degenerative joint disease) of cervical spine    Iron deficiency anemia due to chronic blood loss    Vitamin D deficiency    Obesity (BMI 30.0-34.9)     Past Medical History:   Diagnosis Date    DJD (degenerative joint disease) of cervical spine     on PT    Hypertension      C/o  suspected pulled muscle while reaching into washing machine on yesterday.  Pain is to her right lower back.  Pain is worse with certain movements.  She has tried some ibuprofen with only mild improvement.  No radiation.  No weakness.  No bowel or bladder incontinence.      Review of Systems   Constitutional:  Negative for chills, diaphoresis and fever.   Respiratory:  Negative for cough, shortness of breath and wheezing.    Cardiovascular:  Negative for chest pain and palpitations.     Objective:   /72 (BP Location: Left arm, Patient Position: Sitting, BP Method: Large (Manual))   Pulse (!) 115   Temp 98 °F (36.7 °C) (Tympanic)   Ht 5' 4.02" (1.626 m)   Wt 78.2 kg (172 lb 6.4 oz)   SpO2 98%   BMI 29.58 kg/m²     Physical Exam  Constitutional:       General: She is awake.      Appearance: Normal appearance.   HENT:      Head: Normocephalic and atraumatic.   Eyes:      Conjunctiva/sclera: Conjunctivae normal.   Pulmonary:      Effort: Pulmonary effort is normal.   Musculoskeletal:      Cervical back: Normal range of motion.        Back:       Comments: Symptoms and tenderness.  Pain is reproduced with minimal flexion and extension.  SLT negative bilaterally.  No weakness of lower extremities.   Neurological:      Mental Status: She is alert. Mental status is at baseline.   Psychiatric:         Mood and Affect: Mood normal.         Behavior: Behavior normal. Behavior is " cooperative.         Thought Content: Thought content normal.         Judgment: Judgment normal.         Lab Results   Component Value Date    WBC 8.63 11/29/2023    HGB 11.6 (L) 11/29/2023    HGB 9.0 (L) 01/30/2023    HGB 10.1 (L) 06/07/2021    HCT 35.6 (L) 11/29/2023    MCV 78 (L) 11/29/2023    MCV 76 (L) 01/30/2023    MCV 82 06/07/2021     11/29/2023    CHOL 226 (H) 01/30/2023    TRIG 121 01/30/2023    HDL 57 01/30/2023    LDLCALC 144.8 01/30/2023    LDLCALC 133.4 06/07/2021    LDLCALC 154.8 07/02/2020    ALT 8 (L) 01/30/2023    AST 17 01/30/2023     01/30/2023    K 4.3 01/30/2023    CALCIUM 9.0 01/30/2023     01/30/2023    CO2 25 01/30/2023    BUN 12 01/30/2023    CREATININE 0.9 01/30/2023    CREATININE 0.9 06/07/2021    CREATININE 0.9 07/02/2020    EGFRNORACEVR >60.0 01/30/2023    TSH 0.965 06/07/2021    TSH 1.323 07/02/2020    TSH 0.677 11/13/2019    GLU 81 01/30/2023    HGBA1C 5.4 06/07/2021    PMUYFDXM49DC 11 (L) 01/30/2023    UUSTPECN04CT 10 (L) 06/07/2021    UJLTFQEN82AZ 21 (L) 07/02/2020          The 10-year ASCVD risk score (Samm CHACON, et al., 2019) is: 4.5%    Values used to calculate the score:      Age: 53 years      Sex: Female      Is Non- : Yes      Diabetic: No      Tobacco smoker: No      Systolic Blood Pressure: 130 mmHg      Is BP treated: Yes      HDL Cholesterol: 57 mg/dL      Total Cholesterol: 226 mg/dL     Assessment:     1. Iron deficiency anemia due to chronic blood loss    2. Muscle strain      Plan:   1. Iron deficiency anemia due to chronic blood loss  -     CBC Auto Differential; Future; Expected date: 11/29/2023  -     Iron and TIBC; Future; Expected date: 11/29/2023  -     Ferritin; Future; Expected date: 11/29/2023    2. Muscle strain  -     Discontinue: cyclobenzaprine (FLEXERIL) 10 MG tablet; 1/2 to one tab po qhs prn muscle spasm  Dispense: 30 tablet; Refill: 0  -     naproxen (NAPROSYN) 500 MG tablet; Take 1 tablet (500 mg total) by  mouth 2 (two) times daily with meals. For pain and inflammation  Dispense: 6 tablet; Refill: 0  -     cyclobenzaprine (FLEXERIL) 10 MG tablet; 1/2 to one tab po Q 8 hours p.r.n. muscle spasm.  This medication can cause drowsiness.  Avoid driving when on this medication.  Dispense: 30 tablet; Refill: 0        There are no Patient Instructions on file for this visit.    No future appointments.          No follow-ups on file.  Work excuse for yesterday today, tomorrow, and Friday.

## 2023-11-29 NOTE — LETTER
November 29, 2023      The [address]  [address] [facility]  [place] [address]  Phone: [phone]  Fax: [phone]       Patient: Dana Winter   YOB: 1970  Date of Visit: 11/29/2023    To Whom It May Concern:    Darlyn Winter  was at [facility] on 11/29/2023. Please excuse her for 11/28/23, 11/29/23, and 11/30/23. If you have any questions or concerns, or if I can be of further assistance, please do not hesitate to contact me.    Sincerely,    Vashti Dinh LPN

## 2023-12-04 ENCOUNTER — OFFICE VISIT (OUTPATIENT)
Dept: INTERNAL MEDICINE | Facility: CLINIC | Age: 53
End: 2023-12-04
Payer: COMMERCIAL

## 2023-12-04 ENCOUNTER — LAB VISIT (OUTPATIENT)
Dept: LAB | Facility: HOSPITAL | Age: 53
End: 2023-12-04
Attending: FAMILY MEDICINE
Payer: COMMERCIAL

## 2023-12-04 VITALS
BODY MASS INDEX: 28.83 KG/M2 | HEART RATE: 106 BPM | RESPIRATION RATE: 18 BRPM | DIASTOLIC BLOOD PRESSURE: 78 MMHG | TEMPERATURE: 98 F | HEIGHT: 64 IN | SYSTOLIC BLOOD PRESSURE: 132 MMHG | OXYGEN SATURATION: 97 % | WEIGHT: 168.88 LBS

## 2023-12-04 DIAGNOSIS — I10 ESSENTIAL HYPERTENSION: ICD-10-CM

## 2023-12-04 DIAGNOSIS — M19.041 ARTHRITIS OF RIGHT HAND: ICD-10-CM

## 2023-12-04 DIAGNOSIS — M79.89 SWELLING OF RIGHT HAND: ICD-10-CM

## 2023-12-04 DIAGNOSIS — M54.50 ACUTE BILATERAL LOW BACK PAIN WITHOUT SCIATICA: Primary | ICD-10-CM

## 2023-12-04 DIAGNOSIS — E66.3 OVERWEIGHT (BMI 25.0-29.9): Chronic | ICD-10-CM

## 2023-12-04 LAB
CRP SERPL-MCNC: 424.4 MG/L (ref 0–8.2)
URATE SERPL-MCNC: 11.9 MG/DL (ref 2.4–5.7)

## 2023-12-04 PROCEDURE — 3008F BODY MASS INDEX DOCD: CPT | Mod: CPTII,S$GLB,, | Performed by: FAMILY MEDICINE

## 2023-12-04 PROCEDURE — 3078F PR MOST RECENT DIASTOLIC BLOOD PRESSURE < 80 MM HG: ICD-10-PCS | Mod: CPTII,S$GLB,, | Performed by: FAMILY MEDICINE

## 2023-12-04 PROCEDURE — 99999 PR PBB SHADOW E&M-EST. PATIENT-LVL V: ICD-10-PCS | Mod: PBBFAC,,, | Performed by: FAMILY MEDICINE

## 2023-12-04 PROCEDURE — 1159F PR MEDICATION LIST DOCUMENTED IN MEDICAL RECORD: ICD-10-PCS | Mod: CPTII,S$GLB,, | Performed by: FAMILY MEDICINE

## 2023-12-04 PROCEDURE — 86140 C-REACTIVE PROTEIN: CPT | Performed by: FAMILY MEDICINE

## 2023-12-04 PROCEDURE — 85652 RBC SED RATE AUTOMATED: CPT | Performed by: FAMILY MEDICINE

## 2023-12-04 PROCEDURE — 1159F MED LIST DOCD IN RCRD: CPT | Mod: CPTII,S$GLB,, | Performed by: FAMILY MEDICINE

## 2023-12-04 PROCEDURE — 99999 PR PBB SHADOW E&M-EST. PATIENT-LVL V: CPT | Mod: PBBFAC,,, | Performed by: FAMILY MEDICINE

## 2023-12-04 PROCEDURE — 84550 ASSAY OF BLOOD/URIC ACID: CPT | Performed by: FAMILY MEDICINE

## 2023-12-04 PROCEDURE — 96372 PR INJECTION,THERAP/PROPH/DIAG2ST, IM OR SUBCUT: ICD-10-PCS | Mod: S$GLB,,, | Performed by: FAMILY MEDICINE

## 2023-12-04 PROCEDURE — 3078F DIAST BP <80 MM HG: CPT | Mod: CPTII,S$GLB,, | Performed by: FAMILY MEDICINE

## 2023-12-04 PROCEDURE — 99214 OFFICE O/P EST MOD 30 MIN: CPT | Mod: 25,S$GLB,, | Performed by: FAMILY MEDICINE

## 2023-12-04 PROCEDURE — 96372 THER/PROPH/DIAG INJ SC/IM: CPT | Mod: S$GLB,,, | Performed by: FAMILY MEDICINE

## 2023-12-04 PROCEDURE — 36415 COLL VENOUS BLD VENIPUNCTURE: CPT | Performed by: FAMILY MEDICINE

## 2023-12-04 PROCEDURE — 1160F PR REVIEW ALL MEDS BY PRESCRIBER/CLIN PHARMACIST DOCUMENTED: ICD-10-PCS | Mod: CPTII,S$GLB,, | Performed by: FAMILY MEDICINE

## 2023-12-04 PROCEDURE — 3008F PR BODY MASS INDEX (BMI) DOCUMENTED: ICD-10-PCS | Mod: CPTII,S$GLB,, | Performed by: FAMILY MEDICINE

## 2023-12-04 PROCEDURE — 99214 PR OFFICE/OUTPT VISIT, EST, LEVL IV, 30-39 MIN: ICD-10-PCS | Mod: 25,S$GLB,, | Performed by: FAMILY MEDICINE

## 2023-12-04 PROCEDURE — 1160F RVW MEDS BY RX/DR IN RCRD: CPT | Mod: CPTII,S$GLB,, | Performed by: FAMILY MEDICINE

## 2023-12-04 PROCEDURE — 3075F SYST BP GE 130 - 139MM HG: CPT | Mod: CPTII,S$GLB,, | Performed by: FAMILY MEDICINE

## 2023-12-04 PROCEDURE — 3075F PR MOST RECENT SYSTOLIC BLOOD PRESS GE 130-139MM HG: ICD-10-PCS | Mod: CPTII,S$GLB,, | Performed by: FAMILY MEDICINE

## 2023-12-04 RX ORDER — KETOROLAC TROMETHAMINE 30 MG/ML
30 INJECTION, SOLUTION INTRAMUSCULAR; INTRAVENOUS
Status: COMPLETED | OUTPATIENT
Start: 2023-12-04 | End: 2023-12-04

## 2023-12-04 RX ORDER — PREDNISONE 10 MG/1
TABLET ORAL
Qty: 20 TABLET | Refills: 0 | Status: SHIPPED | OUTPATIENT
Start: 2023-12-04 | End: 2023-12-11

## 2023-12-04 RX ADMIN — KETOROLAC TROMETHAMINE 30 MG: 30 INJECTION, SOLUTION INTRAMUSCULAR; INTRAVENOUS at 11:12

## 2023-12-04 NOTE — PROGRESS NOTES
"Dana Winter  "Dana"  APPT: 12/4/23 10:00 AM CST  Dictated - D+  <<<Awaiting Speech-Recognition Transcription>>>  Note otherwise complete and ready to lock.***      --------------------------------------------------------------------------------     1. Acute bilateral low back pain without sciatica  -     Ambulatory referral/consult to Physical/Occupational Therapy; Future; Expected date: 12/11/2023  -     ketorolac injection 30 mg  -     predniSONE (DELTASONE) 10 MG tablet; Take 4 tablets (40 mg total) by mouth once daily for 2 days, THEN 3 tablets (30 mg total) once daily for 2 days, THEN 2 tablets (20 mg total) once daily for 2 days, THEN 1 tablet (10 mg total) once daily for 2 days.  Dispense: 20 tablet; Refill: 0    2. Arthritis of right hand  -     Uric Acid; Future; Expected date: 12/04/2023  -     Sedimentation rate; Future; Expected date: 12/04/2023  -     C-REACTIVE PROTEIN; Future; Expected date: 12/04/2023    3. Swelling of right hand  -     Uric Acid; Future; Expected date: 12/04/2023  -     Sedimentation rate; Future; Expected date: 12/04/2023  -     C-REACTIVE PROTEIN; Future; Expected date: 12/04/2023      "

## 2023-12-04 NOTE — PROGRESS NOTES
OFFICE VISIT 12/4/23 10:00 AM CST    CHIEF COMPLAINT: Follow-up and Back Pain    She reports that last week she was at home bending over picking up a load of clothes out of her dryer when she felt an acute onset of sharp low back pain located in her upper lumbar region, primarily on the right. She was seen in the clinic and prescribed naproxen and cyclobenzaprine. She reports modest improvement with both. Cyclobenzaprine causes sedation and talking in her sleep. She says that the pain has progressed to the point that it is bilateral, but it is not radiating down her legs. She reports no loss of lower extremity strength or sensation and no saddle anesthesia. The straight leg raise test is negative bilaterally. Patellar and Achilles tendon reflexes are 2+ and normal/symmetric. 10-gram monofilament sensation intact in plantar feet. Toes are downgoing. We discussed the differential diagnosis. It was agreed to proceed with empiric treatment as ordered and physical therapy. Additional education was provided.    Apparently unrelated, a couple of days ago she developed swelling of her right hand, as illustrated in the photo. She reports no trauma to her right hand. She says this has never happened before. She reports no history of gout. On exam, there is no hyperthermia or erythema or induration. There is mild generalized tenderness and moderate pain with the range of motion of joints. I recommended conservative treatment with rest, a soft splint, cool/cold compresses, and evaluation with labs as ordered. She is to return for reevaluation if either of these problems worsens or fails to improve as expected.    1. Acute bilateral low back pain without sciatica  -     Ambulatory referral/consult to Physical/Occupational Therapy; Future; Expected date: 12/11/2023  -     ketorolac injection 30 mg  -     predniSONE (DELTASONE) 10 MG tablet; Take 4 tablets (40 mg total) by mouth once daily for 2 days, THEN 3 tablets (30 mg total)  "once daily for 2 days, THEN 2 tablets (20 mg total) once daily for 2 days, THEN 1 tablet (10 mg total) once daily for 2 days.  Dispense: 20 tablet; Refill: 0    2. Arthritis of right hand  -     Uric Acid; Future; Expected date: 12/04/2023  -     Sedimentation rate; Future; Expected date: 12/04/2023  -     C-REACTIVE PROTEIN; Future; Expected date: 12/04/2023    3. Swelling of right hand  -     Uric Acid; Future; Expected date: 12/04/2023  -     Sedimentation rate; Future; Expected date: 12/04/2023  -     C-REACTIVE PROTEIN; Future; Expected date: 12/04/2023    4. Essential hypertension  Assessment & Plan:  This is a chronic problem that appears compensated/controlled and stable.  BP Readings from Last 6 Encounters:   12/04/23 132/78   11/29/23 130/72   07/05/23 (!) 140/92   04/17/23 122/79   02/14/23 134/78   01/30/23 (!) 138/94          5. Overweight (BMI 25.0-29.9)  Assessment & Plan:  This is a chronic problem, improved through efforts at therapeutic lifestyle changes.  Estimated body mass index is 28.99 kg/m² as calculated from the following:    Height as of this encounter: 5' 4" (1.626 m).    Weight as of this encounter: 76.6 kg (168 lb 14 oz).   Wt Readings from Last 3 Encounters:   12/04/23 76.6 kg (168 lb 14 oz)   11/29/23 78.2 kg (172 lb 6.4 oz)   07/05/23 83.6 kg (184 lb 4.9 oz)          Unless noted herein, any chronic conditions are represented as and appear stable, and no other significant complaints or concerns were reported.    Follow up in about 2 weeks (around 12/18/2023) for re-evaluation with her PCP, Dr. Glass..     Review of Systems   Constitutional:  Negative for chills, diaphoresis, fever and unexpected weight change.   Gastrointestinal:  Negative for change in bowel habit and fecal incontinence.   Genitourinary:  Negative for bladder incontinence, difficulty urinating, dysuria and hematuria.   Neurological:         No saddle anesthesia or loss of lower extremity strength or sensation. " "      Vitals:    12/04/23 1021   BP: 132/78   BP Location: Right arm   Patient Position: Sitting   BP Method: Medium (Manual)   Pulse: 106   Resp: 18   Temp: 97.9 °F (36.6 °C)   TempSrc: Tympanic   SpO2: 97%   Weight: 76.6 kg (168 lb 14 oz)   Height: 5' 4" (1.626 m)   Physical Exam  Vitals reviewed.   Constitutional:       General: She is not in acute distress.     Appearance: Normal appearance. She is not ill-appearing, toxic-appearing or diaphoretic.   Cardiovascular:      Rate and Rhythm: Normal rate.   Pulmonary:      Effort: Pulmonary effort is normal.   Musculoskeletal:         General: Swelling present. No tenderness.      Comments: See description in HPI and photo.   Skin:     General: Skin is warm and dry.   Neurological:      Mental Status: She is alert and oriented to person, place, and time. Mental status is at baseline.   Psychiatric:         Mood and Affect: Mood normal.         Behavior: Behavior normal.         Judgment: Judgment normal.          Documentation entered by me for this encounter may have been done in part using speech-recognition technology. Although I have made an effort to ensure accuracy, "sound like" errors may exist and should be interpreted in context.  "

## 2023-12-04 NOTE — PATIENT INSTRUCTIONS
2023        TO WHOM IT MAY CONCERN:     RE:  Dana Daniels Moy ,  1970     Dana was treated by me on 23.    She is estimated to be able to return to work without restrictions in 2-3 days.    Please extend to Dana all due courtesy and consideration and excuse her absence.    Sincerely,     GLYNN Do MD

## 2023-12-05 LAB — ERYTHROCYTE [SEDIMENTATION RATE] IN BLOOD BY PHOTOMETRIC METHOD: >120 MM/HR (ref 0–36)

## 2023-12-10 ENCOUNTER — PATIENT MESSAGE (OUTPATIENT)
Dept: INTERNAL MEDICINE | Facility: CLINIC | Age: 53
End: 2023-12-10
Payer: COMMERCIAL

## 2023-12-10 DIAGNOSIS — M10.041 ACUTE IDIOPATHIC GOUT OF RIGHT HAND: Primary | ICD-10-CM

## 2023-12-10 DIAGNOSIS — M79.89 SWELLING OF RIGHT HAND: ICD-10-CM

## 2023-12-10 DIAGNOSIS — R79.82 ELEVATED C-REACTIVE PROTEIN (CRP): ICD-10-CM

## 2023-12-10 DIAGNOSIS — R70.0 ELEVATED SEDIMENTATION RATE: ICD-10-CM

## 2023-12-10 DIAGNOSIS — M19.041 ARTHRITIS OF RIGHT HAND: ICD-10-CM

## 2023-12-10 PROBLEM — E66.3 OVERWEIGHT (BMI 25.0-29.9): Chronic | Status: ACTIVE | Noted: 2020-06-30

## 2023-12-10 NOTE — PROGRESS NOTES
@MY TEAM:   I sent Dana the Patient Portal message below. Please contact Dana to relay message or at least verify that she read and understood the message.  Please help her schedule labs at least 24 hours before her next appointment in this clinic.  Future Appointments   Date Time Provider Department [place]   12/20/2023  9:20 AM Jazzmine Snyder PA-C [facility]      @DR. PARK: Please see my Patient Portal message below. This is just FYI only. No action required unless you need to contact the patient.   --------------------------------------------------------------------------------   Dear Dana,    I hope this message finds you feeling better.    I wanted to touch base regarding the results of your recent lab tests. The tests showed elevated levels of uric acid and inflammation markers, which are consistent with gout, a type of arthritis that can cause painful swelling in the joints. I've included information about gout at the bottom of this message.    Your uric acid level was 11.9 mg/dL, which is higher than the typical range of 2.4 - 5.7 mg/dL. Additionally, your sedimentation rate and C-reactive protein (CRP) levels were also elevated, indicating significant inflammation in your body.    I believe that this is the cause of your painful, swollen hand. I does not appear related to your back pain.    Given these results, it's important that the prednisone I prescribed is helping to alleviate your symptoms. If you are still experiencing pain and swelling in your hand, I recommend you come in for a re-evaluation sooner rather than later. You can see me, Dr. Park, or any available provider at our office.    You already have a follow-up appointment scheduled with Jazzmine Snyder PA-C, for 12/20/23 at 9:20 AM. If the prednisone is working and you're feeling better, you can keep this appointment to discuss your labs and the next steps for your care. However, if you'd like to address your symptoms or  concerns before this date, please do not hesitate to schedule an earlier appointment with us.    I've ordered some follow-up labs (listed below) to monitor your condition. It would be best to have these done at least one before your next appointment, so we have the most current information to discuss. You can call the appointment line at [phone] to schedule your labs, or send a message to our staff if you need any assistance with this.    Your health and comfort are our top priority, and we want to ensure you get the care you need. Thank you for your attention to this matter, and I look forward to ensuring your continued well-being.    Warm regards,    Dr. GUADARRAMA    Lab Orders Placed This Encounter  CBC Auto Differential  Sedimentation rate  C-REACTIVE PROTEIN  Basic Metabolic Panel    Treating Gout with Healthy Eating    Gout is a type of arthritis caused by the buildup of uric acid crystals in the joints, leading to inflammation and severe pain. People with gout need to manage their diet carefully to avoid foods that trigger gout attacks and to promote the excretion of uric acid from the body. A diet that is low in purines, a substance found in certain foods that can increase the level of uric acid in the body, is recommended for people with gout.    Here are some foods that are good and bad for people with gout:    Foods to eat:    Low-fat dairy products: Milk, cheese, and yogurt are good sources of low-fat protein and are also low in purines.    Vegetables: Most vegetables are low in purines and can be consumed without any problem. Dark green vegetables like spinach and kale are particularly good for people with gout.    Fruits: Fruits like cherries, berries, oranges, and pineapple are low in purines and can be eaten without any problem.    Complex carbohydrates: Whole-grain bread, pasta, and rice are good sources of complex carbohydrates and are also low in purines.    Water: Drinking plenty of water can help  to flush out uric acid from the body and prevent gout attacks.    Foods to avoid:    Organ meats: Liver, kidney, and other organ meats are high in purines and should be avoided.    Seafood: Certain types of seafood like shrimp, crab, and lobster are high in purines and should be avoided.    Red meat: Beef, pork, and lamb are high in purines and should be consumed in moderation.    Alcohol: Beer and hard liquor, especially, can increase the risk of gout attacks.    Sugary beverages: High-fructose corn syrup found in sugary beverages can increase uric acid levels and increase the risk of gout attacks.    It's important to note that everyone's body is different, and what may trigger gout attacks in one person may not affect another.

## 2023-12-10 NOTE — ASSESSMENT & PLAN NOTE
This is a chronic problem that appears compensated/controlled and stable.  BP Readings from Last 6 Encounters:   12/04/23 132/78   11/29/23 130/72   07/05/23 (!) 140/92   04/17/23 122/79   02/14/23 134/78   01/30/23 (!) 138/94

## 2023-12-10 NOTE — PROGRESS NOTES
Dear Dana,    I hope this message finds you feeling better.    I wanted to touch base regarding the results of your recent lab tests. The tests showed elevated levels of uric acid and inflammation markers, which are consistent with gout, a type of arthritis that can cause painful swelling in the joints. I've included information about gout at the bottom of this message.    Your uric acid level was 11.9 mg/dL, which is higher than the typical range of 2.4 - 5.7 mg/dL. Additionally, your sedimentation rate and C-reactive protein (CRP) levels were also elevated, indicating significant inflammation in your body.    I believe that this is the cause of your painful, swollen hand. I does not appear related to your back pain.    Given these results, it's important that the prednisone I prescribed is helping to alleviate your symptoms. If you are still experiencing pain and swelling in your hand, I recommend you come in for a re-evaluation sooner rather than later. You can see me, Dr. Glass, or any available provider at our office.    You already have a follow-up appointment scheduled with Jazzmine Snyder PA-C, for 12/20/23 at 9:20 AM. If the prednisone is working and you're feeling better, you can keep this appointment to discuss your labs and the next steps for your care. However, if you'd like to address your symptoms or concerns before this date, please do not hesitate to schedule an earlier appointment with us.    I've ordered some follow-up labs (listed below) to monitor your condition. It would be best to have these done at least one before your next appointment, so we have the most current information to discuss. You can call the appointment line at [phone] to schedule your labs, or send a message to our staff if you need any assistance with this.    Your health and comfort are our top priority, and we want to ensure you get the care you need. Thank you for your attention to this matter, and I look forward to  ensuring your continued well-being.    Warm regards,    Dr. GUADARRAMA    Lab Orders Placed This Encounter  ·CBC Auto Differential  ·Sedimentation rate  ·C-REACTIVE PROTEIN  ·Basic Metabolic Panel    Treating Gout with Healthy Eating    Gout is a type of arthritis caused by the buildup of uric acid crystals in the joints, leading to inflammation and severe pain. People with gout need to manage their diet carefully to avoid foods that trigger gout attacks and to promote the excretion of uric acid from the body. A diet that is low in purines, a substance found in certain foods that can increase the level of uric acid in the body, is recommended for people with gout.    Here are some foods that are good and bad for people with gout:    Foods to eat:    Low-fat dairy products: Milk, cheese, and yogurt are good sources of low-fat protein and are also low in purines.    Vegetables: Most vegetables are low in purines and can be consumed without any problem. Dark green vegetables like spinach and kale are particularly good for people with gout.    Fruits: Fruits like cherries, berries, oranges, and pineapple are low in purines and can be eaten without any problem.    Complex carbohydrates: Whole-grain bread, pasta, and rice are good sources of complex carbohydrates and are also low in purines.    Water: Drinking plenty of water can help to flush out uric acid from the body and prevent gout attacks.    Foods to avoid:    Organ meats: Liver, kidney, and other organ meats are high in purines and should be avoided.    Seafood: Certain types of seafood like shrimp, crab, and lobster are high in purines and should be avoided.    Red meat: Beef, pork, and lamb are high in purines and should be consumed in moderation.    Alcohol: Beer and hard liquor, especially, can increase the risk of gout attacks.    Sugary beverages: High-fructose corn syrup found in sugary beverages can increase uric acid levels and increase the risk of gout  attacks.    It's important to note that everyone's body is different, and what may trigger gout attacks in one person may not affect another.

## 2023-12-10 NOTE — ASSESSMENT & PLAN NOTE
"This is a chronic problem, improved through efforts at therapeutic lifestyle changes.  Estimated body mass index is 28.99 kg/m² as calculated from the following:    Height as of this encounter: 5' 4" (1.626 m).    Weight as of this encounter: 76.6 kg (168 lb 14 oz).   Wt Readings from Last 3 Encounters:   12/04/23 76.6 kg (168 lb 14 oz)   11/29/23 78.2 kg (172 lb 6.4 oz)   07/05/23 83.6 kg (184 lb 4.9 oz)      "

## 2023-12-11 ENCOUNTER — TELEPHONE (OUTPATIENT)
Dept: INTERNAL MEDICINE | Facility: CLINIC | Age: 53
End: 2023-12-11
Payer: COMMERCIAL

## 2023-12-13 ENCOUNTER — LAB VISIT (OUTPATIENT)
Dept: LAB | Facility: HOSPITAL | Age: 53
End: 2023-12-13
Attending: FAMILY MEDICINE
Payer: COMMERCIAL

## 2023-12-13 DIAGNOSIS — R79.82 ELEVATED C-REACTIVE PROTEIN (CRP): ICD-10-CM

## 2023-12-13 DIAGNOSIS — M19.041 ARTHRITIS OF RIGHT HAND: ICD-10-CM

## 2023-12-13 DIAGNOSIS — M79.89 SWELLING OF RIGHT HAND: ICD-10-CM

## 2023-12-13 DIAGNOSIS — M10.041 ACUTE IDIOPATHIC GOUT OF RIGHT HAND: ICD-10-CM

## 2023-12-13 DIAGNOSIS — R70.0 ELEVATED SEDIMENTATION RATE: ICD-10-CM

## 2023-12-13 LAB
ANION GAP SERPL CALC-SCNC: 10 MMOL/L (ref 8–16)
ANISOCYTOSIS BLD QL SMEAR: SLIGHT
BASOPHILS # BLD AUTO: 0.04 K/UL (ref 0–0.2)
BASOPHILS NFR BLD: 0.2 % (ref 0–1.9)
BUN SERPL-MCNC: 35 MG/DL (ref 6–20)
CALCIUM SERPL-MCNC: 9.4 MG/DL (ref 8.7–10.5)
CHLORIDE SERPL-SCNC: 102 MMOL/L (ref 95–110)
CO2 SERPL-SCNC: 26 MMOL/L (ref 23–29)
CREAT SERPL-MCNC: 1.6 MG/DL (ref 0.5–1.4)
CRP SERPL-MCNC: 234.7 MG/L (ref 0–8.2)
DACRYOCYTES BLD QL SMEAR: ABNORMAL
DIFFERENTIAL METHOD: ABNORMAL
EOSINOPHIL # BLD AUTO: 0 K/UL (ref 0–0.5)
EOSINOPHIL NFR BLD: 0 % (ref 0–8)
ERYTHROCYTE [DISTWIDTH] IN BLOOD BY AUTOMATED COUNT: 16.6 % (ref 11.5–14.5)
ERYTHROCYTE [SEDIMENTATION RATE] IN BLOOD BY PHOTOMETRIC METHOD: 106 MM/HR (ref 0–36)
EST. GFR  (NO RACE VARIABLE): 38.6 ML/MIN/1.73 M^2
GIANT PLATELETS BLD QL SMEAR: PRESENT
GLUCOSE SERPL-MCNC: 131 MG/DL (ref 70–110)
HCT VFR BLD AUTO: 32.3 % (ref 37–48.5)
HGB BLD-MCNC: 11.1 G/DL (ref 12–16)
HYPOCHROMIA BLD QL SMEAR: ABNORMAL
IMM GRANULOCYTES # BLD AUTO: 0.18 K/UL (ref 0–0.04)
IMM GRANULOCYTES NFR BLD AUTO: 0.8 % (ref 0–0.5)
LYMPHOCYTES # BLD AUTO: 0.8 K/UL (ref 1–4.8)
LYMPHOCYTES NFR BLD: 3.5 % (ref 18–48)
MCH RBC QN AUTO: 24.3 PG (ref 27–31)
MCHC RBC AUTO-ENTMCNC: 34.4 G/DL (ref 32–36)
MCV RBC AUTO: 71 FL (ref 82–98)
MONOCYTES # BLD AUTO: 1.2 K/UL (ref 0.3–1)
MONOCYTES NFR BLD: 5.4 % (ref 4–15)
NEUTROPHILS # BLD AUTO: 20.3 K/UL (ref 1.8–7.7)
NEUTROPHILS NFR BLD: 90.1 % (ref 38–73)
NRBC BLD-RTO: 0 /100 WBC
PLATELET # BLD AUTO: 653 K/UL (ref 150–450)
PLATELET BLD QL SMEAR: ABNORMAL
PMV BLD AUTO: 9.8 FL (ref 9.2–12.9)
POIKILOCYTOSIS BLD QL SMEAR: SLIGHT
POTASSIUM SERPL-SCNC: 5.5 MMOL/L (ref 3.5–5.1)
RBC # BLD AUTO: 4.56 M/UL (ref 4–5.4)
SODIUM SERPL-SCNC: 138 MMOL/L (ref 136–145)
TARGETS BLD QL SMEAR: ABNORMAL
WBC # BLD AUTO: 22.49 K/UL (ref 3.9–12.7)

## 2023-12-13 PROCEDURE — 86140 C-REACTIVE PROTEIN: CPT | Performed by: FAMILY MEDICINE

## 2023-12-13 PROCEDURE — 36415 COLL VENOUS BLD VENIPUNCTURE: CPT | Performed by: FAMILY MEDICINE

## 2023-12-13 PROCEDURE — 85652 RBC SED RATE AUTOMATED: CPT | Performed by: FAMILY MEDICINE

## 2023-12-13 PROCEDURE — 80048 BASIC METABOLIC PNL TOTAL CA: CPT | Performed by: FAMILY MEDICINE

## 2023-12-13 PROCEDURE — 85025 COMPLETE CBC W/AUTO DIFF WBC: CPT | Performed by: FAMILY MEDICINE

## 2023-12-15 ENCOUNTER — HOSPITAL ENCOUNTER (OUTPATIENT)
Dept: RADIOLOGY | Facility: HOSPITAL | Age: 53
Discharge: HOME | End: 2023-12-15
Attending: FAMILY MEDICINE
Payer: COMMERCIAL

## 2023-12-15 ENCOUNTER — OFFICE VISIT (OUTPATIENT)
Dept: INTERNAL MEDICINE | Facility: CLINIC | Age: 53
End: 2023-12-15
Payer: COMMERCIAL

## 2023-12-15 ENCOUNTER — TELEPHONE (OUTPATIENT)
Dept: INTERNAL MEDICINE | Facility: CLINIC | Age: 53
End: 2023-12-15

## 2023-12-15 VITALS
RESPIRATION RATE: 20 BRPM | SYSTOLIC BLOOD PRESSURE: 130 MMHG | HEART RATE: 57 BPM | HEIGHT: 64 IN | BODY MASS INDEX: 27.33 KG/M2 | OXYGEN SATURATION: 94 % | DIASTOLIC BLOOD PRESSURE: 78 MMHG | WEIGHT: 160.06 LBS

## 2023-12-15 DIAGNOSIS — R06.00 DYSPNEA, UNSPECIFIED TYPE: ICD-10-CM

## 2023-12-15 DIAGNOSIS — E79.0 HYPERURICEMIA: ICD-10-CM

## 2023-12-15 DIAGNOSIS — E55.9 VITAMIN D DEFICIENCY: ICD-10-CM

## 2023-12-15 DIAGNOSIS — N18.31 STAGE 3A CHRONIC KIDNEY DISEASE: ICD-10-CM

## 2023-12-15 DIAGNOSIS — M79.89 SWELLING OF RIGHT HAND: ICD-10-CM

## 2023-12-15 DIAGNOSIS — D72.829 LEUKOCYTOSIS, UNSPECIFIED TYPE: ICD-10-CM

## 2023-12-15 DIAGNOSIS — M79.89 SWELLING OF RIGHT HAND: Primary | ICD-10-CM

## 2023-12-15 DIAGNOSIS — I10 ESSENTIAL HYPERTENSION: Chronic | ICD-10-CM

## 2023-12-15 PROCEDURE — 99214 PR OFFICE/OUTPT VISIT, EST, LEVL IV, 30-39 MIN: ICD-10-PCS | Mod: S$GLB,,, | Performed by: FAMILY MEDICINE

## 2023-12-15 PROCEDURE — 99999 PR PBB SHADOW E&M-EST. PATIENT-LVL V: ICD-10-PCS | Mod: PBBFAC,,, | Performed by: FAMILY MEDICINE

## 2023-12-15 PROCEDURE — 71046 XR CHEST PA AND LATERAL: ICD-10-PCS | Mod: 26,,, | Performed by: RADIOLOGY

## 2023-12-15 PROCEDURE — 3008F PR BODY MASS INDEX (BMI) DOCUMENTED: ICD-10-PCS | Mod: CPTII,S$GLB,, | Performed by: FAMILY MEDICINE

## 2023-12-15 PROCEDURE — 99214 OFFICE O/P EST MOD 30 MIN: CPT | Mod: S$GLB,,, | Performed by: FAMILY MEDICINE

## 2023-12-15 PROCEDURE — 73130 XR HAND COMPLETE 3 VIEW RIGHT: ICD-10-PCS | Mod: 26,RT,, | Performed by: RADIOLOGY

## 2023-12-15 PROCEDURE — 3075F PR MOST RECENT SYSTOLIC BLOOD PRESS GE 130-139MM HG: ICD-10-PCS | Mod: CPTII,S$GLB,, | Performed by: FAMILY MEDICINE

## 2023-12-15 PROCEDURE — 71046 X-RAY EXAM CHEST 2 VIEWS: CPT | Mod: 26,,, | Performed by: RADIOLOGY

## 2023-12-15 PROCEDURE — 99999 PR PBB SHADOW E&M-EST. PATIENT-LVL V: CPT | Mod: PBBFAC,,, | Performed by: FAMILY MEDICINE

## 2023-12-15 PROCEDURE — 3075F SYST BP GE 130 - 139MM HG: CPT | Mod: CPTII,S$GLB,, | Performed by: FAMILY MEDICINE

## 2023-12-15 PROCEDURE — 71046 X-RAY EXAM CHEST 2 VIEWS: CPT | Mod: TC

## 2023-12-15 PROCEDURE — 1160F RVW MEDS BY RX/DR IN RCRD: CPT | Mod: CPTII,S$GLB,, | Performed by: FAMILY MEDICINE

## 2023-12-15 PROCEDURE — 1160F PR REVIEW ALL MEDS BY PRESCRIBER/CLIN PHARMACIST DOCUMENTED: ICD-10-PCS | Mod: CPTII,S$GLB,, | Performed by: FAMILY MEDICINE

## 2023-12-15 PROCEDURE — 73130 X-RAY EXAM OF HAND: CPT | Mod: TC,RT

## 2023-12-15 PROCEDURE — 73130 X-RAY EXAM OF HAND: CPT | Mod: 26,RT,, | Performed by: RADIOLOGY

## 2023-12-15 PROCEDURE — 3008F BODY MASS INDEX DOCD: CPT | Mod: CPTII,S$GLB,, | Performed by: FAMILY MEDICINE

## 2023-12-15 PROCEDURE — 1159F PR MEDICATION LIST DOCUMENTED IN MEDICAL RECORD: ICD-10-PCS | Mod: CPTII,S$GLB,, | Performed by: FAMILY MEDICINE

## 2023-12-15 PROCEDURE — 3078F DIAST BP <80 MM HG: CPT | Mod: CPTII,S$GLB,, | Performed by: FAMILY MEDICINE

## 2023-12-15 PROCEDURE — 3078F PR MOST RECENT DIASTOLIC BLOOD PRESSURE < 80 MM HG: ICD-10-PCS | Mod: CPTII,S$GLB,, | Performed by: FAMILY MEDICINE

## 2023-12-15 PROCEDURE — 1159F MED LIST DOCD IN RCRD: CPT | Mod: CPTII,S$GLB,, | Performed by: FAMILY MEDICINE

## 2023-12-15 RX ORDER — DICLOFENAC SODIUM 10 MG/G
2 GEL TOPICAL 3 TIMES DAILY
Qty: 100 G | Refills: 1 | Status: SHIPPED | OUTPATIENT
Start: 2023-12-15

## 2023-12-15 NOTE — PROGRESS NOTES
"Subjective:       Patient ID: Dana Winter is a 53 y.o. female.    Chief Complaint: Hand Pain    53-year-old  female patient with Patient Active Problem List:     Essential hypertension     DJD (degenerative joint disease) of cervical spine     Iron deficiency anemia due to chronic blood loss     Vitamin D deficiency     Overweight (BMI 25.0-29.9)     Hyperuricemia  Here with complaint of swelling to the right hand dorsally for the past 2 weeks, and reports pain up to 8/10 patient continues to have flank pain and abdominal pain, but has been having severe pain to the right hand for which patient had seen another provider Dr. Do 2 weeks ago and was prescribed prednisone taper which has helped some, patient does not report any family history of rheumatoid arthritis  Does not recall any injury or trauma        Review of Systems   Constitutional:  Negative for fatigue and fever.   Eyes:  Negative for visual disturbance.   Respiratory:  Positive for shortness of breath. Negative for wheezing.    Cardiovascular:  Negative for chest pain and leg swelling.   Gastrointestinal:  Negative for abdominal pain, nausea and vomiting.   Musculoskeletal:  Positive for arthralgias, back pain, joint swelling and myalgias.   Skin:  Positive for color change. Negative for rash.   Neurological:  Negative for weakness, light-headedness, numbness and headaches.   Psychiatric/Behavioral:  Negative for sleep disturbance.          /78 (BP Location: Right arm, Patient Position: Sitting, BP Method: Large (Manual))   Pulse (!) 57   Resp 20   Ht 5' 4" (1.626 m)   Wt 72.6 kg (160 lb 0.9 oz)   LMP 11/27/2023 (Exact Date)   SpO2 (!) 94%   BMI 27.47 kg/m²   Objective:      Physical Exam  Constitutional:       Appearance: She is well-developed.   HENT:      Head: Normocephalic and atraumatic.   Cardiovascular:      Rate and Rhythm: Normal rate and regular rhythm.      Heart sounds: Normal heart sounds. No " murmur heard.  Pulmonary:      Effort: Pulmonary effort is normal. No respiratory distress.      Breath sounds: Normal breath sounds. No wheezing.   Abdominal:      General: Bowel sounds are normal.      Palpations: Abdomen is soft.      Tenderness: There is no abdominal tenderness.   Musculoskeletal:         General: Swelling, tenderness and deformity present.      Comments: Positive for swelling and tenderness to the right hand dorsally with increased tenderness to the multiple MCP joints  Noted hyperpigmentation to the skin and unable to make a fist     Skin:     General: Skin is warm and dry.      Findings: No rash.   Neurological:      Mental Status: She is alert and oriented to person, place, and time.   Psychiatric:         Mood and Affect: Mood normal.           Assessment/Plan:   1. Swelling of right hand  -     X-Ray Hand Complete Right; Future; Expected date: 12/15/2023  -     CBC Auto Differential; Future; Expected date: 12/15/2023  -     RHEUMATOID FACTOR; Future; Expected date: 12/15/2023  -     MAURI; Future; Expected date: 12/15/2023  -     CYCLIC CITRUL PEPTIDE ANTIBODY, IGG; Future; Expected date: 12/15/2023  -     ANTI -SSA ANTIBODY; Future; Expected date: 12/15/2023  -     ANTI-SSB ANTIBODY; Future; Expected date: 12/15/2023  -     Procalcitonin; Future; Expected date: 12/15/2023  -     Ambulatory referral/consult to Rheumatology; Future; Expected date: 12/22/2023  -     diclofenac sodium (VOLTAREN) 1 % Gel; Apply 2 g topically 3 (three) times daily.  Dispense: 100 g; Refill: 1    Patient recently finished prednisone taper  Voltaren gel prescribed today for symptomatic relief and will get x-ray of the hand to look into further etiology including autoimmune testing  Reviewed recent labs showing significant elevation in white count could be secondary to recent prednisone use  Also noted elevated inflammation markers and uric acid  Will refer to rheumatology for further evaluation    2.  Hyperuricemia  -     Ambulatory referral/consult to Rheumatology; Future; Expected date: 12/22/2023      3. Essential hypertension  -     Basic Metabolic Panel; Future; Expected date: 12/15/2023  Blood pressure is stable currently on losartan hydrochlorothiazide 100/25 mg    4. Dyspnea, unspecified type  -     X-Ray Chest PA And Lateral; Future; Expected date: 12/15/2023  Radiologist technician informed that patient has been having shortness of breath at the end of the day, ordered the chest x-ray

## 2023-12-15 NOTE — TELEPHONE ENCOUNTER
ISSAC. Scheduled appt today at 1540 for hand pain. Pt would like to discuss abnormal labs.//orquidea

## 2023-12-15 NOTE — TELEPHONE ENCOUNTER
----- Message from Jose Willoughby sent at 12/15/2023  8:38 AM CST -----  Contact: pt  Pt rrecently had labs done this past week ordered by Dr Do but would like to have her Dr/ nurse explain them to her/ pt states that she is still in pain from her hand pain     Type:  Same Day Appointment Request    Caller is requesting a same day appointment.  Caller declined first available appointment listed below.       Name of Caller: HONORIO COX [[id number]]  When is the first available appointment?   Symptoms: follow up on hand pain   Best Call Back Number:  [phone]  Additional Information:

## 2023-12-17 DIAGNOSIS — M79.89 SWELLING OF RIGHT HAND: Primary | ICD-10-CM

## 2023-12-17 DIAGNOSIS — R93.89 ABNORMAL CHEST X-RAY: ICD-10-CM

## 2023-12-17 DIAGNOSIS — M15.4 EROSIVE OSTEOARTHRITIS OF RIGHT HAND: ICD-10-CM

## 2023-12-17 RX ORDER — DOXYCYCLINE 100 MG/1
100 CAPSULE ORAL 2 TIMES DAILY
Qty: 14 CAPSULE | Refills: 0 | Status: SHIPPED | OUTPATIENT
Start: 2023-12-17 | End: 2023-12-24

## 2023-12-17 RX ORDER — AMOXICILLIN AND CLAVULANATE POTASSIUM 875; 125 MG/1; MG/1
1 TABLET, FILM COATED ORAL EVERY 12 HOURS
Qty: 14 TABLET | Refills: 0 | Status: SHIPPED | OUTPATIENT
Start: 2023-12-17 | End: 2023-12-24

## 2023-12-18 ENCOUNTER — PATIENT MESSAGE (OUTPATIENT)
Dept: INTERNAL MEDICINE | Facility: CLINIC | Age: 53
End: 2023-12-18
Payer: COMMERCIAL

## 2023-12-18 ENCOUNTER — TELEPHONE (OUTPATIENT)
Dept: INTERNAL MEDICINE | Facility: CLINIC | Age: 53
End: 2023-12-18
Payer: COMMERCIAL

## 2023-12-18 ENCOUNTER — LAB VISIT (OUTPATIENT)
Dept: LAB | Facility: HOSPITAL | Age: 53
End: 2023-12-18
Payer: COMMERCIAL

## 2023-12-18 ENCOUNTER — LAB VISIT (OUTPATIENT)
Dept: LAB | Facility: HOSPITAL | Age: 53
End: 2023-12-18
Attending: FAMILY MEDICINE
Payer: COMMERCIAL

## 2023-12-18 DIAGNOSIS — M79.89 SWELLING OF RIGHT HAND: Primary | ICD-10-CM

## 2023-12-18 DIAGNOSIS — M79.89 SWELLING OF RIGHT HAND: ICD-10-CM

## 2023-12-18 DIAGNOSIS — D72.829 LEUKOCYTOSIS, UNSPECIFIED TYPE: ICD-10-CM

## 2023-12-18 DIAGNOSIS — D50.0 IRON DEFICIENCY ANEMIA DUE TO CHRONIC BLOOD LOSS: Primary | ICD-10-CM

## 2023-12-18 DIAGNOSIS — D75.839 THROMBOCYTOSIS: ICD-10-CM

## 2023-12-18 LAB
BASOPHILS # BLD AUTO: 0.06 K/UL (ref 0–0.2)
BASOPHILS NFR BLD: 0.7 % (ref 0–1.9)
DIFFERENTIAL METHOD: ABNORMAL
EOSINOPHIL # BLD AUTO: 0.1 K/UL (ref 0–0.5)
EOSINOPHIL NFR BLD: 1.3 % (ref 0–8)
ERYTHROCYTE [DISTWIDTH] IN BLOOD BY AUTOMATED COUNT: 16.7 % (ref 11.5–14.5)
HCT VFR BLD AUTO: 30 % (ref 37–48.5)
HGB BLD-MCNC: 9.6 G/DL (ref 12–16)
HIV 1+2 AB+HIV1 P24 AG SERPL QL IA: NORMAL
IMM GRANULOCYTES # BLD AUTO: 0.02 K/UL (ref 0–0.04)
IMM GRANULOCYTES NFR BLD AUTO: 0.2 % (ref 0–0.5)
LYMPHOCYTES # BLD AUTO: 1.6 K/UL (ref 1–4.8)
LYMPHOCYTES NFR BLD: 18.3 % (ref 18–48)
MCH RBC QN AUTO: 24.6 PG (ref 27–31)
MCHC RBC AUTO-ENTMCNC: 32 G/DL (ref 32–36)
MCV RBC AUTO: 77 FL (ref 82–98)
MONOCYTES # BLD AUTO: 0.5 K/UL (ref 0.3–1)
MONOCYTES NFR BLD: 5.9 % (ref 4–15)
NEUTROPHILS # BLD AUTO: 6.3 K/UL (ref 1.8–7.7)
NEUTROPHILS NFR BLD: 73.6 % (ref 38–73)
NRBC BLD-RTO: 0 /100 WBC
PLATELET # BLD AUTO: 1049 K/UL (ref 150–450)
PLATELET BLD QL SMEAR: ABNORMAL
PMV BLD AUTO: 8.6 FL (ref 9.2–12.9)
RBC # BLD AUTO: 3.9 M/UL (ref 4–5.4)
WBC # BLD AUTO: 8.59 K/UL (ref 3.9–12.7)

## 2023-12-18 PROCEDURE — 36415 COLL VENOUS BLD VENIPUNCTURE: CPT | Performed by: FAMILY MEDICINE

## 2023-12-18 PROCEDURE — 87591 N.GONORRHOEAE DNA AMP PROB: CPT | Performed by: FAMILY MEDICINE

## 2023-12-18 PROCEDURE — 85025 COMPLETE CBC W/AUTO DIFF WBC: CPT | Performed by: FAMILY MEDICINE

## 2023-12-18 PROCEDURE — 86592 SYPHILIS TEST NON-TREP QUAL: CPT | Performed by: FAMILY MEDICINE

## 2023-12-18 PROCEDURE — 87389 HIV-1 AG W/HIV-1&-2 AB AG IA: CPT | Performed by: FAMILY MEDICINE

## 2023-12-18 NOTE — TELEPHONE ENCOUNTER
Secondary to chronic anemia and extreme elevation in  platelets-will refer to Hematology, try to get a sooner appointment

## 2023-12-18 NOTE — TELEPHONE ENCOUNTER
Discussed test results with patient and advised to get started on antibiotics as prescribed  Will order more labs including CBC which was clotted-patient will come in today for the labs and also ordered MRI of the hand without contrast  Discussed with rheumatologist and has an appointment with Dr. Valera on Friday

## 2023-12-18 NOTE — TELEPHONE ENCOUNTER
Spoke with [facility]. States pt CBC specimen clotted before it can be ran. Please advise.//ddw

## 2023-12-18 NOTE — TELEPHONE ENCOUNTER
Unable to LM. No voicemail set up.//ddw    MRI changed to 12/20/2023 at 7:00 pm due to approval would not be available for date of 12/19/2023.

## 2023-12-19 ENCOUNTER — TELEPHONE (OUTPATIENT)
Dept: HEMATOLOGY/ONCOLOGY | Facility: CLINIC | Age: 53
End: 2023-12-19
Payer: COMMERCIAL

## 2023-12-19 DIAGNOSIS — D50.0 IRON DEFICIENCY ANEMIA DUE TO CHRONIC BLOOD LOSS: Primary | ICD-10-CM

## 2023-12-19 DIAGNOSIS — D75.839 THROMBOCYTOSIS: ICD-10-CM

## 2023-12-19 LAB
C TRACH DNA SPEC QL NAA+PROBE: NOT DETECTED
N GONORRHOEA DNA SPEC QL NAA+PROBE: NOT DETECTED
RPR SER QL: NORMAL

## 2023-12-19 NOTE — TELEPHONE ENCOUNTER
Pain Medicine Spoke to patient in reference to Hematology referral from Dr. Glass.  Appointment scheduled per patient's request next available at the [facility].  Appointment notice via pt portal.

## 2023-12-21 ENCOUNTER — HOSPITAL ENCOUNTER (OUTPATIENT)
Dept: RADIOLOGY | Facility: HOSPITAL | Age: 53
Discharge: HOME | End: 2023-12-21
Attending: FAMILY MEDICINE
Payer: COMMERCIAL

## 2023-12-21 DIAGNOSIS — M79.89 SWELLING OF RIGHT HAND: ICD-10-CM

## 2023-12-21 DIAGNOSIS — D72.829 LEUKOCYTOSIS, UNSPECIFIED TYPE: ICD-10-CM

## 2023-12-21 PROCEDURE — 73220 MRI HAND FINGERS W WO CONTRAST RIGHT: ICD-10-PCS | Mod: 26,RT,, | Performed by: RADIOLOGY

## 2023-12-21 PROCEDURE — A9585 GADOBUTROL INJECTION: HCPCS | Mod: PN | Performed by: FAMILY MEDICINE

## 2023-12-21 PROCEDURE — 73220 MRI UPPR EXTREMITY W/O&W/DYE: CPT | Mod: TC,PN,RT

## 2023-12-21 PROCEDURE — 73220 MRI UPPR EXTREMITY W/O&W/DYE: CPT | Mod: 26,RT,, | Performed by: RADIOLOGY

## 2023-12-21 PROCEDURE — 25500020 PHARM REV CODE 255: Mod: PN | Performed by: FAMILY MEDICINE

## 2023-12-21 RX ORDER — GADOBUTROL 604.72 MG/ML
7 INJECTION INTRAVENOUS
Status: COMPLETED | OUTPATIENT
Start: 2023-12-21 | End: 2023-12-21

## 2023-12-21 RX ADMIN — GADOBUTROL 7 ML: 604.72 INJECTION INTRAVENOUS at 12:12

## 2023-12-22 ENCOUNTER — LAB VISIT (OUTPATIENT)
Dept: LAB | Facility: HOSPITAL | Age: 53
End: 2023-12-22
Attending: STUDENT IN AN ORGANIZED HEALTH CARE EDUCATION/TRAINING PROGRAM
Payer: COMMERCIAL

## 2023-12-22 ENCOUNTER — OFFICE VISIT (OUTPATIENT)
Dept: RHEUMATOLOGY | Facility: CLINIC | Age: 53
End: 2023-12-22
Payer: COMMERCIAL

## 2023-12-22 VITALS
HEART RATE: 111 BPM | SYSTOLIC BLOOD PRESSURE: 113 MMHG | HEIGHT: 64 IN | DIASTOLIC BLOOD PRESSURE: 73 MMHG | WEIGHT: 162.69 LBS | BODY MASS INDEX: 27.77 KG/M2

## 2023-12-22 DIAGNOSIS — E79.0 HYPERURICEMIA: ICD-10-CM

## 2023-12-22 DIAGNOSIS — M79.89 SWELLING OF RIGHT HAND: ICD-10-CM

## 2023-12-22 DIAGNOSIS — N17.9 AKI (ACUTE KIDNEY INJURY): ICD-10-CM

## 2023-12-22 DIAGNOSIS — M79.89 SWELLING OF RIGHT HAND: Primary | ICD-10-CM

## 2023-12-22 LAB
ALBUMIN SERPL BCP-MCNC: 2.3 G/DL (ref 3.5–5.2)
ALP SERPL-CCNC: 147 U/L (ref 55–135)
ALT SERPL W/O P-5'-P-CCNC: 20 U/L (ref 10–44)
ANION GAP SERPL CALC-SCNC: 9 MMOL/L (ref 8–16)
AST SERPL-CCNC: 16 U/L (ref 10–40)
BASOPHILS # BLD AUTO: 0.1 K/UL (ref 0–0.2)
BASOPHILS NFR BLD: 1.3 % (ref 0–1.9)
BILIRUB SERPL-MCNC: 0.8 MG/DL (ref 0.1–1)
BILIRUB UR QL STRIP: NEGATIVE
BUN SERPL-MCNC: 23 MG/DL (ref 6–20)
CALCIUM SERPL-MCNC: 10 MG/DL (ref 8.7–10.5)
CHLORIDE SERPL-SCNC: 102 MMOL/L (ref 95–110)
CLARITY UR: CLEAR
CO2 SERPL-SCNC: 30 MMOL/L (ref 23–29)
COLOR UR: NORMAL
CREAT SERPL-MCNC: 1.5 MG/DL (ref 0.5–1.4)
CREAT UR-MCNC: 99 MG/DL (ref 15–325)
CRP SERPL-MCNC: 67.6 MG/L (ref 0–8.2)
DIFFERENTIAL METHOD: ABNORMAL
EOSINOPHIL # BLD AUTO: 0.2 K/UL (ref 0–0.5)
EOSINOPHIL NFR BLD: 2.7 % (ref 0–8)
ERYTHROCYTE [DISTWIDTH] IN BLOOD BY AUTOMATED COUNT: 16.2 % (ref 11.5–14.5)
ERYTHROCYTE [SEDIMENTATION RATE] IN BLOOD BY PHOTOMETRIC METHOD: >120 MM/HR (ref 0–36)
EST. GFR  (NO RACE VARIABLE): 41.7 ML/MIN/1.73 M^2
GLUCOSE SERPL-MCNC: 108 MG/DL (ref 70–110)
GLUCOSE UR QL STRIP: NEGATIVE
HCT VFR BLD AUTO: 30.5 % (ref 37–48.5)
HGB BLD-MCNC: 9.6 G/DL (ref 12–16)
HGB UR QL STRIP: NEGATIVE
IMM GRANULOCYTES # BLD AUTO: 0.02 K/UL (ref 0–0.04)
IMM GRANULOCYTES NFR BLD AUTO: 0.3 % (ref 0–0.5)
KETONES UR QL STRIP: NEGATIVE
LEUKOCYTE ESTERASE UR QL STRIP: NEGATIVE
LYMPHOCYTES # BLD AUTO: 1.6 K/UL (ref 1–4.8)
LYMPHOCYTES NFR BLD: 20.5 % (ref 18–48)
MCH RBC QN AUTO: 24.4 PG (ref 27–31)
MCHC RBC AUTO-ENTMCNC: 31.5 G/DL (ref 32–36)
MCV RBC AUTO: 78 FL (ref 82–98)
MONOCYTES # BLD AUTO: 0.4 K/UL (ref 0.3–1)
MONOCYTES NFR BLD: 5.6 % (ref 4–15)
NEUTROPHILS # BLD AUTO: 5.4 K/UL (ref 1.8–7.7)
NEUTROPHILS NFR BLD: 69.6 % (ref 38–73)
NITRITE UR QL STRIP: NEGATIVE
NRBC BLD-RTO: 0 /100 WBC
PH UR STRIP: 6 [PH] (ref 5–8)
PLATELET # BLD AUTO: 858 K/UL (ref 150–450)
PMV BLD AUTO: 8.4 FL (ref 9.2–12.9)
POTASSIUM SERPL-SCNC: 4.5 MMOL/L (ref 3.5–5.1)
PROCALCITONIN SERPL IA-MCNC: 0.25 NG/ML
PROT SERPL-MCNC: 8 G/DL (ref 6–8.4)
PROT UR QL STRIP: NEGATIVE
PROT UR-MCNC: 14 MG/DL (ref 0–15)
PROT/CREAT UR: 0.14 MG/G{CREAT} (ref 0–0.2)
RBC # BLD AUTO: 3.93 M/UL (ref 4–5.4)
SODIUM SERPL-SCNC: 141 MMOL/L (ref 136–145)
SP GR UR STRIP: 1.01 (ref 1–1.03)
URN SPEC COLLECT METH UR: NORMAL
WBC # BLD AUTO: 7.8 K/UL (ref 3.9–12.7)

## 2023-12-22 PROCEDURE — 1159F MED LIST DOCD IN RCRD: CPT | Mod: CPTII,S$GLB,, | Performed by: STUDENT IN AN ORGANIZED HEALTH CARE EDUCATION/TRAINING PROGRAM

## 2023-12-22 PROCEDURE — 86140 C-REACTIVE PROTEIN: CPT | Performed by: STUDENT IN AN ORGANIZED HEALTH CARE EDUCATION/TRAINING PROGRAM

## 2023-12-22 PROCEDURE — 82570 ASSAY OF URINE CREATININE: CPT | Performed by: STUDENT IN AN ORGANIZED HEALTH CARE EDUCATION/TRAINING PROGRAM

## 2023-12-22 PROCEDURE — 99999 PR PBB SHADOW E&M-EST. PATIENT-LVL IV: ICD-10-PCS | Mod: PBBFAC,,, | Performed by: STUDENT IN AN ORGANIZED HEALTH CARE EDUCATION/TRAINING PROGRAM

## 2023-12-22 PROCEDURE — 81003 URINALYSIS AUTO W/O SCOPE: CPT | Performed by: STUDENT IN AN ORGANIZED HEALTH CARE EDUCATION/TRAINING PROGRAM

## 2023-12-22 PROCEDURE — 3008F BODY MASS INDEX DOCD: CPT | Mod: CPTII,S$GLB,, | Performed by: STUDENT IN AN ORGANIZED HEALTH CARE EDUCATION/TRAINING PROGRAM

## 2023-12-22 PROCEDURE — 1160F PR REVIEW ALL MEDS BY PRESCRIBER/CLIN PHARMACIST DOCUMENTED: ICD-10-PCS | Mod: CPTII,S$GLB,, | Performed by: STUDENT IN AN ORGANIZED HEALTH CARE EDUCATION/TRAINING PROGRAM

## 2023-12-22 PROCEDURE — 3078F DIAST BP <80 MM HG: CPT | Mod: CPTII,S$GLB,, | Performed by: STUDENT IN AN ORGANIZED HEALTH CARE EDUCATION/TRAINING PROGRAM

## 2023-12-22 PROCEDURE — 85652 RBC SED RATE AUTOMATED: CPT | Performed by: STUDENT IN AN ORGANIZED HEALTH CARE EDUCATION/TRAINING PROGRAM

## 2023-12-22 PROCEDURE — 36415 COLL VENOUS BLD VENIPUNCTURE: CPT | Performed by: STUDENT IN AN ORGANIZED HEALTH CARE EDUCATION/TRAINING PROGRAM

## 2023-12-22 PROCEDURE — 3074F PR MOST RECENT SYSTOLIC BLOOD PRESSURE < 130 MM HG: ICD-10-PCS | Mod: CPTII,S$GLB,, | Performed by: STUDENT IN AN ORGANIZED HEALTH CARE EDUCATION/TRAINING PROGRAM

## 2023-12-22 PROCEDURE — 99204 OFFICE O/P NEW MOD 45 MIN: CPT | Mod: S$GLB,,, | Performed by: STUDENT IN AN ORGANIZED HEALTH CARE EDUCATION/TRAINING PROGRAM

## 2023-12-22 PROCEDURE — 1159F PR MEDICATION LIST DOCUMENTED IN MEDICAL RECORD: ICD-10-PCS | Mod: CPTII,S$GLB,, | Performed by: STUDENT IN AN ORGANIZED HEALTH CARE EDUCATION/TRAINING PROGRAM

## 2023-12-22 PROCEDURE — 3008F PR BODY MASS INDEX (BMI) DOCUMENTED: ICD-10-PCS | Mod: CPTII,S$GLB,, | Performed by: STUDENT IN AN ORGANIZED HEALTH CARE EDUCATION/TRAINING PROGRAM

## 2023-12-22 PROCEDURE — 1160F RVW MEDS BY RX/DR IN RCRD: CPT | Mod: CPTII,S$GLB,, | Performed by: STUDENT IN AN ORGANIZED HEALTH CARE EDUCATION/TRAINING PROGRAM

## 2023-12-22 PROCEDURE — 80053 COMPREHEN METABOLIC PANEL: CPT | Performed by: STUDENT IN AN ORGANIZED HEALTH CARE EDUCATION/TRAINING PROGRAM

## 2023-12-22 PROCEDURE — 3074F SYST BP LT 130 MM HG: CPT | Mod: CPTII,S$GLB,, | Performed by: STUDENT IN AN ORGANIZED HEALTH CARE EDUCATION/TRAINING PROGRAM

## 2023-12-22 PROCEDURE — 99204 PR OFFICE/OUTPT VISIT, NEW, LEVL IV, 45-59 MIN: ICD-10-PCS | Mod: S$GLB,,, | Performed by: STUDENT IN AN ORGANIZED HEALTH CARE EDUCATION/TRAINING PROGRAM

## 2023-12-22 PROCEDURE — 85025 COMPLETE CBC W/AUTO DIFF WBC: CPT | Performed by: STUDENT IN AN ORGANIZED HEALTH CARE EDUCATION/TRAINING PROGRAM

## 2023-12-22 PROCEDURE — 84145 PROCALCITONIN (PCT): CPT | Performed by: STUDENT IN AN ORGANIZED HEALTH CARE EDUCATION/TRAINING PROGRAM

## 2023-12-22 PROCEDURE — 99999 PR PBB SHADOW E&M-EST. PATIENT-LVL IV: CPT | Mod: PBBFAC,,, | Performed by: STUDENT IN AN ORGANIZED HEALTH CARE EDUCATION/TRAINING PROGRAM

## 2023-12-22 PROCEDURE — 3078F PR MOST RECENT DIASTOLIC BLOOD PRESSURE < 80 MM HG: ICD-10-PCS | Mod: CPTII,S$GLB,, | Performed by: STUDENT IN AN ORGANIZED HEALTH CARE EDUCATION/TRAINING PROGRAM

## 2023-12-22 RX ORDER — DEXTROMETHORPHAN HYDROBROMIDE, GUAIFENESIN 5; 100 MG/5ML; MG/5ML
650 LIQUID ORAL EVERY 8 HOURS
Qty: 90 TABLET | Refills: 3 | Status: SHIPPED | OUTPATIENT
Start: 2023-12-22

## 2023-12-22 NOTE — PROGRESS NOTES
Subjective:      Patient ID: Dana Winter is a 53 y.o. female.    Chief Complaint: Swelling and Hand Pain    HPI:   Patient is a 54 yo F who presents for right hand pain and swelling. Patient is not a great historian so some history taken from the chart. Symptoms started just before 11/29/2023 when she was reaching in the washing machine and felt right back muscle strain. 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 A few days later, she noticed right dorsal hand swelling extending to the right fingers. Associated warmth, erythema, significant pain. She showed me a photo on her phone dated 12/4/2023:      She presented to Internal Medicine on 12/4/23 and was given a toradol IM injection and prednisone taper (40 mg for 2 days and taper by 10 mg every 2 days) for presumed gout. At that time uric acid was 11 and CRP was 424. She reports not much improvement with the prednisone taper though her CRP improved to 234 by 12/13/2023. Then on 12/15/2023, she saw her PCP and got Voltaren gel and lab work was done showing worsened anemia (chronic), platelets >1000 (clotted?), procalcitonin 1.34, Cr 1.7 with potassium 5.9 (also high on 12/13/23 and last checked in 1/2023 when it was normal). She went to get x-ray of her hand on 12/15/23 and was short of breath in radiology department so a chest x-ray was done and showed some concern for pneumonia. Therefore she was given Augmentin and doxycycline. She reports improvement in the right hand symptoms since then.    During all this, she hasn't taken much for the pain. Naproxen for pain once nightly for some time. Used ibuprofen once or twice.    She endorses unintentional weight loss of 22 lbs in 5 months.    She has never been diagnosed with gout and no family history of gout. No previous joint swelling and pain attacks.  History of  "kidney stones: no  Family history of gout: no  Previous gout medications: no  Alcohol use: no  High fructose corn syrup use: yes, lemonade, sweet tea, slushies  Low purine diet: no    No skin rashes, malar rash, photosensitivity   No telangiectasias   No calcinosis   No psoriasis   No patchy alopecia   No oral or nasal ulcers   No dry eyes or dry mouth   No pleurisy, chest pain, dyspnea, cough  No dysphagia, diplopia, dysphonia  No muscle weakness   No Raynaud's  No digital ulcers   No cytopenias   No renal issues   No blood clots   No fever, chills, night sweats, or loss of appetite. +weight loss  No new onset headaches   No recurrent conjunctivitis, uveitis, scleritis, or episcleritis   No chronic or bloody diarrhea. No Ulcerative Colitis or Chron's (IBD)  No vaginal or penile and urethral discharge/STDs/ulcers   No unexplained lymphadenopathy  No parotitis   No seizures, strokes, psychosis  No sclerodactyly  No perioral tightness     Social Hx: never smoker, no drug use, no alcohol  Family Hx: No family history of autoimmune disease    Review of Systems   Constitutional:  Negative for chills, fever and weight loss.   Respiratory:  Negative for cough and shortness of breath.    Cardiovascular:  Negative for chest pain.   Gastrointestinal:  Negative for diarrhea, nausea and vomiting.   Genitourinary:  Negative for dysuria.   Musculoskeletal:  Positive for back pain and joint pain. Negative for myalgias.   Skin:  Negative for rash.   Neurological:  Negative for tingling and weakness.        Objective:   /73   Pulse (!) 111   Ht 5' 4" (1.626 m)   Wt 73.8 kg (162 lb 11.2 oz)   LMP 11/27/2023 (Exact Date)   BMI 27.93 kg/m²   Physical Exam  Constitutional:       General: She is not in acute distress.     Appearance: Normal appearance.   HENT:      Head: Normocephalic and atraumatic.      Mouth/Throat:      Mouth: Mucous membranes are moist.      Pharynx: Oropharynx is clear.   Cardiovascular:      Rate and " Rhythm: Normal rate and regular rhythm.   Pulmonary:      Effort: Pulmonary effort is normal.      Breath sounds: Normal breath sounds.   Abdominal:      Palpations: Abdomen is soft.      Tenderness: There is no abdominal tenderness.   Musculoskeletal:      Cervical back: Normal range of motion. No tenderness.      Comments: Swelling of right dorsal hand (much improved from previous photos) with no tenderness. Swelling of right 2nd and 4th PIPs with tenderness. No other joint tenderness or swelling.   Skin:     General: Skin is warm and dry.   Neurological:      Mental Status: She is alert and oriented to person, place, and time. Mental status is at baseline.           Assessment:     1. Swelling of right hand    2. Hyperuricemia    3. MARCO (acute kidney injury)          Plan:     Problem List Items Addressed This Visit          Unprioritized    Hyperuricemia    Relevant Orders    CBC Auto Differential    Comprehensive Metabolic Panel    C-Reactive Protein    Sedimentation rate    Urinalysis    Protein/Creatinine Ratio, Urine    Procalcitonin     Other Visit Diagnoses       Swelling of right hand    -  Primary    Relevant Orders    CBC Auto Differential    Comprehensive Metabolic Panel    C-Reactive Protein    Sedimentation rate    Urinalysis    Protein/Creatinine Ratio, Urine    Procalcitonin    MARCO (acute kidney injury)        Relevant Orders    CBC Auto Differential    Comprehensive Metabolic Panel    C-Reactive Protein    Sedimentation rate    Urinalysis    Protein/Creatinine Ratio, Urine    Procalcitonin            Patient is a 52 yo F who presents for right hand pain and swelling.    MRI hand w/wo contrast 12/21/2023:  1.    Negative for evidence of os myelitis.  Scattered degenerative changes noted.  2.    Exuberant joint effusion fourth MP joint, nonspecific.  Inflammatory arthropathy possible.  Septic arthritis thought less likely.  3.    Extensor and flexor tenosynovitis with exuberant tenosynovitis of the  flexor tendons of the index and ring fingers.  Additional exuberant soft tissue edema throughout the index and ring fingers.  No focal fluid collections to suggest abscess.      She initially did a short prednisone taper but did not have much improvement in the right hand pain and swelling. Most of her improvement has been in the past 5 days while on antibiotics. Unclear whether this was a gout attack which resolved on its own regardless of the medication or infection getting better with antibiotics. Uric acid 11 but difficult to interpret in setting of acute gout attack if this is one. If this is gout, would not treat for first instance except in the case of CKD with high uric acid in her range. Unclear how long she has had elevated creatinine and low GFR. At this point there is not much swelling in the right dorsal hand and no tenderness. Still some PIP swelling and tenderness. Nothing that I can aspirate and send for fluid studies today.    Multiple lab abnormalities from 12/13 and 12/15.  MARCO with hyperkalemia, worsened anemia with significantly elevated platelets, elevated procalcitonin.  She might have had pneumonia which is now better with antibiotics.    Negative RF, CCP, MAURI.    Plan:  Check labs today CBC, CMP, ESR, CRP, UA, UPCR  Stop NSAIDs  Avoid another prednisone taper since it didn't help and symptoms are improved possibly with antibiotics  Tylenol arthritis TID for the pain  She will see Hematology for the thrombocytosis on 1/24/24  Follow up in 6 weeks to ensure resolution of right hand swelling and to repeat uric acid. She was instructed to go to the ED if her right hand worsens. She will message me in a week to let me know how she's doing.      Follow up in about 6 weeks (around 2/2/2024).       I spent a total of 45 minutes on the day of the visit.  This includes face to face time and non-face to face time preparing to see the patient (eg, review of tests), obtaining and/or reviewing separately  obtained history, documenting clinical information in the electronic or other health record, independently interpreting results and communicating results to the patient/family/caregiver, or care coordinator.

## 2024-01-12 ENCOUNTER — TELEPHONE (OUTPATIENT)
Dept: INTERNAL MEDICINE | Facility: CLINIC | Age: 54
End: 2024-01-12
Payer: COMMERCIAL

## 2024-01-18 ENCOUNTER — PATIENT MESSAGE (OUTPATIENT)
Dept: RHEUMATOLOGY | Facility: CLINIC | Age: 54
End: 2024-01-18
Payer: COMMERCIAL

## 2024-01-20 ENCOUNTER — LAB VISIT (OUTPATIENT)
Dept: LAB | Facility: HOSPITAL | Age: 54
End: 2024-01-20
Attending: INTERNAL MEDICINE
Payer: COMMERCIAL

## 2024-01-20 DIAGNOSIS — D75.839 THROMBOCYTOSIS: ICD-10-CM

## 2024-01-20 DIAGNOSIS — D50.0 IRON DEFICIENCY ANEMIA DUE TO CHRONIC BLOOD LOSS: ICD-10-CM

## 2024-01-20 LAB
ALBUMIN SERPL BCP-MCNC: 3.3 G/DL (ref 3.5–5.2)
ALP SERPL-CCNC: 102 U/L (ref 55–135)
ALT SERPL W/O P-5'-P-CCNC: 8 U/L (ref 10–44)
ANION GAP SERPL CALC-SCNC: 10 MMOL/L (ref 8–16)
AST SERPL-CCNC: 14 U/L (ref 10–40)
BASOPHILS # BLD AUTO: 0.05 K/UL (ref 0–0.2)
BASOPHILS NFR BLD: 0.7 % (ref 0–1.9)
BILIRUB SERPL-MCNC: 0.5 MG/DL (ref 0.1–1)
BUN SERPL-MCNC: 14 MG/DL (ref 6–20)
CALCIUM SERPL-MCNC: 9.1 MG/DL (ref 8.7–10.5)
CHLORIDE SERPL-SCNC: 104 MMOL/L (ref 95–110)
CO2 SERPL-SCNC: 25 MMOL/L (ref 23–29)
CREAT SERPL-MCNC: 1.1 MG/DL (ref 0.5–1.4)
CRP SERPL-MCNC: 3 MG/L (ref 0–8.2)
DIFFERENTIAL METHOD BLD: ABNORMAL
EOSINOPHIL # BLD AUTO: 0.1 K/UL (ref 0–0.5)
EOSINOPHIL NFR BLD: 1 % (ref 0–8)
ERYTHROCYTE [DISTWIDTH] IN BLOOD BY AUTOMATED COUNT: 17.8 % (ref 11.5–14.5)
ERYTHROCYTE [SEDIMENTATION RATE] IN BLOOD BY PHOTOMETRIC METHOD: 63 MM/HR (ref 0–36)
EST. GFR  (NO RACE VARIABLE): >60 ML/MIN/1.73 M^2
FERRITIN SERPL-MCNC: 91 NG/ML (ref 20–300)
FOLATE SERPL-MCNC: 6.7 NG/ML (ref 4–24)
GLUCOSE SERPL-MCNC: 85 MG/DL (ref 70–110)
HCT VFR BLD AUTO: 29.5 % (ref 37–48.5)
HGB BLD-MCNC: 9.5 G/DL (ref 12–16)
IMM GRANULOCYTES # BLD AUTO: 0.01 K/UL (ref 0–0.04)
IMM GRANULOCYTES NFR BLD AUTO: 0.1 % (ref 0–0.5)
IRON SERPL-MCNC: 44 UG/DL (ref 30–160)
LYMPHOCYTES # BLD AUTO: 2.4 K/UL (ref 1–4.8)
LYMPHOCYTES NFR BLD: 32.9 % (ref 18–48)
MCH RBC QN AUTO: 25.3 PG (ref 27–31)
MCHC RBC AUTO-ENTMCNC: 32.2 G/DL (ref 32–36)
MCV RBC AUTO: 79 FL (ref 82–98)
MONOCYTES # BLD AUTO: 0.5 K/UL (ref 0.3–1)
MONOCYTES NFR BLD: 7.4 % (ref 4–15)
NEUTROPHILS # BLD AUTO: 4.2 K/UL (ref 1.8–7.7)
NEUTROPHILS NFR BLD: 57.9 % (ref 38–73)
NRBC BLD-RTO: 0 /100 WBC
PATH REV BLD -IMP: NORMAL
PLATELET # BLD AUTO: 397 K/UL (ref 150–450)
PMV BLD AUTO: 8.9 FL (ref 9.2–12.9)
POTASSIUM SERPL-SCNC: 4 MMOL/L (ref 3.5–5.1)
PROT SERPL-MCNC: 7.5 G/DL (ref 6–8.4)
RBC # BLD AUTO: 3.75 M/UL (ref 4–5.4)
SATURATED IRON: 11 % (ref 20–50)
SODIUM SERPL-SCNC: 139 MMOL/L (ref 136–145)
TOTAL IRON BINDING CAPACITY: 383 UG/DL (ref 250–450)
TRANSFERRIN SERPL-MCNC: 259 MG/DL (ref 200–375)
VIT B12 SERPL-MCNC: 545 PG/ML (ref 210–950)
WBC # BLD AUTO: 7.17 K/UL (ref 3.9–12.7)

## 2024-01-20 PROCEDURE — 36415 COLL VENOUS BLD VENIPUNCTURE: CPT | Performed by: INTERNAL MEDICINE

## 2024-01-20 PROCEDURE — 80053 COMPREHEN METABOLIC PANEL: CPT | Performed by: INTERNAL MEDICINE

## 2024-01-20 PROCEDURE — 82746 ASSAY OF FOLIC ACID SERUM: CPT | Performed by: INTERNAL MEDICINE

## 2024-01-20 PROCEDURE — 86140 C-REACTIVE PROTEIN: CPT | Performed by: INTERNAL MEDICINE

## 2024-01-20 PROCEDURE — 82728 ASSAY OF FERRITIN: CPT | Performed by: INTERNAL MEDICINE

## 2024-01-20 PROCEDURE — 82607 VITAMIN B-12: CPT | Performed by: INTERNAL MEDICINE

## 2024-01-20 PROCEDURE — 85060 BLOOD SMEAR INTERPRETATION: CPT | Mod: ,,, | Performed by: PATHOLOGY

## 2024-01-20 PROCEDURE — 85652 RBC SED RATE AUTOMATED: CPT | Performed by: INTERNAL MEDICINE

## 2024-01-20 PROCEDURE — 83540 ASSAY OF IRON: CPT | Performed by: INTERNAL MEDICINE

## 2024-01-20 PROCEDURE — 85025 COMPLETE CBC W/AUTO DIFF WBC: CPT | Performed by: INTERNAL MEDICINE

## 2024-01-22 LAB — PATH REV BLD -IMP: NORMAL

## 2024-01-23 DIAGNOSIS — D50.0 IRON DEFICIENCY ANEMIA DUE TO CHRONIC BLOOD LOSS: Primary | ICD-10-CM

## 2024-01-24 ENCOUNTER — OFFICE VISIT (OUTPATIENT)
Dept: HEMATOLOGY/ONCOLOGY | Facility: CLINIC | Age: 54
End: 2024-01-24
Payer: COMMERCIAL

## 2024-01-24 ENCOUNTER — LAB VISIT (OUTPATIENT)
Dept: LAB | Facility: HOSPITAL | Age: 54
End: 2024-01-24
Attending: INTERNAL MEDICINE
Payer: COMMERCIAL

## 2024-01-24 VITALS
HEIGHT: 64 IN | WEIGHT: 168.19 LBS | TEMPERATURE: 98 F | BODY MASS INDEX: 28.71 KG/M2 | HEART RATE: 84 BPM | RESPIRATION RATE: 20 BRPM | OXYGEN SATURATION: 100 % | DIASTOLIC BLOOD PRESSURE: 90 MMHG | SYSTOLIC BLOOD PRESSURE: 138 MMHG

## 2024-01-24 DIAGNOSIS — I10 ESSENTIAL HYPERTENSION: Primary | Chronic | ICD-10-CM

## 2024-01-24 DIAGNOSIS — D75.839 THROMBOCYTOSIS: ICD-10-CM

## 2024-01-24 DIAGNOSIS — Z86.2 HISTORY OF THROMBOCYTOSIS: ICD-10-CM

## 2024-01-24 DIAGNOSIS — D50.0 IRON DEFICIENCY ANEMIA DUE TO CHRONIC BLOOD LOSS: ICD-10-CM

## 2024-01-24 LAB
BASOPHILS # BLD AUTO: 0.07 K/UL (ref 0–0.2)
BASOPHILS NFR BLD: 1.2 % (ref 0–1.9)
DIFFERENTIAL METHOD BLD: ABNORMAL
EOSINOPHIL # BLD AUTO: 0.2 K/UL (ref 0–0.5)
EOSINOPHIL NFR BLD: 3.3 % (ref 0–8)
ERYTHROCYTE [DISTWIDTH] IN BLOOD BY AUTOMATED COUNT: 17.8 % (ref 11.5–14.5)
HCT VFR BLD AUTO: 31.5 % (ref 37–48.5)
HGB BLD-MCNC: 9.8 G/DL (ref 12–16)
IMM GRANULOCYTES # BLD AUTO: 0.01 K/UL (ref 0–0.04)
IMM GRANULOCYTES NFR BLD AUTO: 0.2 % (ref 0–0.5)
LYMPHOCYTES # BLD AUTO: 1.8 K/UL (ref 1–4.8)
LYMPHOCYTES NFR BLD: 31.4 % (ref 18–48)
MCH RBC QN AUTO: 25.3 PG (ref 27–31)
MCHC RBC AUTO-ENTMCNC: 31.1 G/DL (ref 32–36)
MCV RBC AUTO: 81 FL (ref 82–98)
MONOCYTES # BLD AUTO: 0.4 K/UL (ref 0.3–1)
MONOCYTES NFR BLD: 6.7 % (ref 4–15)
NEUTROPHILS # BLD AUTO: 3.3 K/UL (ref 1.8–7.7)
NEUTROPHILS NFR BLD: 57.2 % (ref 38–73)
NRBC BLD-RTO: 0 /100 WBC
PLATELET # BLD AUTO: 374 K/UL (ref 150–450)
PMV BLD AUTO: 9.3 FL (ref 9.2–12.9)
RBC # BLD AUTO: 3.87 M/UL (ref 4–5.4)
WBC # BLD AUTO: 5.82 K/UL (ref 3.9–12.7)

## 2024-01-24 PROCEDURE — 84165 PROTEIN E-PHORESIS SERUM: CPT | Mod: 26,,, | Performed by: PATHOLOGY

## 2024-01-24 PROCEDURE — 3075F SYST BP GE 130 - 139MM HG: CPT | Mod: CPTII,S$GLB,, | Performed by: INTERNAL MEDICINE

## 2024-01-24 PROCEDURE — 83521 IG LIGHT CHAINS FREE EACH: CPT | Mod: 59 | Performed by: INTERNAL MEDICINE

## 2024-01-24 PROCEDURE — 86334 IMMUNOFIX E-PHORESIS SERUM: CPT | Mod: 26,,, | Performed by: PATHOLOGY

## 2024-01-24 PROCEDURE — 3080F DIAST BP >= 90 MM HG: CPT | Mod: CPTII,S$GLB,, | Performed by: INTERNAL MEDICINE

## 2024-01-24 PROCEDURE — 3008F BODY MASS INDEX DOCD: CPT | Mod: CPTII,S$GLB,, | Performed by: INTERNAL MEDICINE

## 2024-01-24 PROCEDURE — 80053 COMPREHEN METABOLIC PANEL: CPT | Performed by: INTERNAL MEDICINE

## 2024-01-24 PROCEDURE — 1159F MED LIST DOCD IN RCRD: CPT | Mod: CPTII,S$GLB,, | Performed by: INTERNAL MEDICINE

## 2024-01-24 PROCEDURE — 99204 OFFICE O/P NEW MOD 45 MIN: CPT | Mod: S$GLB,,, | Performed by: INTERNAL MEDICINE

## 2024-01-24 PROCEDURE — 99999 PR PBB SHADOW E&M-EST. PATIENT-LVL IV: CPT | Mod: PBBFAC,,, | Performed by: INTERNAL MEDICINE

## 2024-01-24 PROCEDURE — 36415 COLL VENOUS BLD VENIPUNCTURE: CPT | Performed by: INTERNAL MEDICINE

## 2024-01-24 PROCEDURE — 82232 ASSAY OF BETA-2 PROTEIN: CPT | Performed by: INTERNAL MEDICINE

## 2024-01-24 PROCEDURE — 83615 LACTATE (LD) (LDH) ENZYME: CPT | Performed by: INTERNAL MEDICINE

## 2024-01-24 PROCEDURE — 82784 ASSAY IGA/IGD/IGG/IGM EACH: CPT | Mod: 59 | Performed by: INTERNAL MEDICINE

## 2024-01-24 PROCEDURE — 86334 IMMUNOFIX E-PHORESIS SERUM: CPT | Performed by: INTERNAL MEDICINE

## 2024-01-24 PROCEDURE — 84165 PROTEIN E-PHORESIS SERUM: CPT | Performed by: INTERNAL MEDICINE

## 2024-01-24 PROCEDURE — 85025 COMPLETE CBC W/AUTO DIFF WBC: CPT | Performed by: INTERNAL MEDICINE

## 2024-01-24 RX ORDER — IRON,CARBONYL/ASCORBIC ACID 100-250 MG
1 TABLET ORAL DAILY
Qty: 31 TABLET | Refills: 11 | Status: SHIPPED | OUTPATIENT
Start: 2024-01-24

## 2024-01-24 NOTE — PROGRESS NOTES
Patient ID: Dana Winter   Chief Complaint: Establish Care (Anemia/Thrombocytosis)  MRN:  [id number]     Reason for Referral:  Anemia and Thrombocytosis  Subjective   Dana Winter is a 53 y.o. female who presents to clinic to establish care on referral for anemia and thrombocytopenia.    Patient reports blood in her stool this week after straining, but has never had that before.   She has been having more sporadic menses but her last heavy period was in October 2023.  She is now perimenopausal. She has no acute issues.  I reviewed her labs with her in detail.  She is currently undergoing rheumatologic workup for chronic inflammatory symptoms which may explain the anemia and elevated light chains.  We will continue to monitor her for now.    Review of Systems:  Review of Systems   Constitutional:  Positive for diaphoresis and fatigue. Negative for activity change, appetite change, chills, fever and unexpected weight change.   HENT:  Negative for nosebleeds.    Respiratory:  Negative for shortness of breath.    Cardiovascular:  Negative for chest pain.   Gastrointestinal:  Negative for abdominal distention, abdominal pain, anal bleeding, blood in stool, constipation, diarrhea, nausea and vomiting.   Genitourinary:  Negative for difficulty urinating and hematuria.   Musculoskeletal:  Positive for joint swelling (hands). Negative for arthralgias, back pain and myalgias.   Skin:  Negative for rash.   Neurological:  Negative for dizziness, weakness, light-headedness and headaches.   Hematological:  Does not bruise/bleed easily.   Psychiatric/Behavioral:  The patient is not nervous/anxious.      History     Past Medical History:   Diagnosis Date    DJD (degenerative joint disease) of cervical spine     on PT    Hypertension        Past Surgical History:   Procedure Laterality Date    APPENDECTOMY      COLONOSCOPY N/A 7/23/2021    Procedure: COLONOSCOPY;  Surgeon: Michelle Harry MD;  Location: [facility]  "ENDO;  Service: Endoscopy;  Laterality: N/A;    LAPAROSCOPIC APPENDECTOMY      TUBAL LIGATION         Family History   Problem Relation Age of Onset    Hypertension Father     Stroke Father     Heart attack Father        Review of patient's allergies indicates:  No Known Allergies    Social History     Tobacco Use    Smoking status: Never    Smokeless tobacco: Never   Substance Use Topics    Alcohol use: No    Drug use: No       Physical Exam     Vitals:  BP (!) 138/90   Pulse 84   Temp 97.6 °F (36.4 °C) (Temporal)   Resp 20   Ht 5' 4" (1.626 m)   Wt 76.3 kg (168 lb 3.4 oz)   SpO2 100%   BMI 28.87 kg/m²     Physical Exam:  Physical Exam  Constitutional:       General: She is not in acute distress.     Appearance: Normal appearance. She is not ill-appearing.   HENT:      Head: Normocephalic and atraumatic.   Eyes:      Extraocular Movements: Extraocular movements intact.      Conjunctiva/sclera: Conjunctivae normal.   Pulmonary:      Effort: Pulmonary effort is normal. No respiratory distress.   Abdominal:      Palpations: There is no hepatomegaly or splenomegaly.   Skin:     Findings: No rash.   Neurological:      General: No focal deficit present.      Mental Status: She is alert and oriented to person, place, and time.   Psychiatric:         Mood and Affect: Mood normal.         Behavior: Behavior normal.         Thought Content: Thought content normal.        Labs   Labs:  Lab Visit on 01/24/2024   Component Date Value Ref Range Status    Beta-2 Microglobulin 01/24/2024 1.8  0.0 - 2.5 ug/mL Final    WBC 01/24/2024 5.82  3.90 - 12.70 K/uL Final    RBC 01/24/2024 3.87 (L)  4.00 - 5.40 M/uL Final    Hemoglobin 01/24/2024 9.8 (L)  12.0 - 16.0 g/dL Final    Hematocrit 01/24/2024 31.5 (L)  37.0 - 48.5 % Final    MCV 01/24/2024 81 (L)  82 - 98 fL Final    MCH 01/24/2024 25.3 (L)  27.0 - 31.0 pg Final    MCHC 01/24/2024 31.1 (L)  32.0 - 36.0 g/dL Final    RDW 01/24/2024 17.8 (H)  11.5 - 14.5 % Final    Platelets " 01/24/2024 374  150 - 450 K/uL Final    MPV 01/24/2024 9.3  9.2 - 12.9 fL Final    Immature Granulocytes 01/24/2024 0.2  0.0 - 0.5 % Final    Gran # (ANC) 01/24/2024 3.3  1.8 - 7.7 K/uL Final    Immature Grans (Abs) 01/24/2024 0.01  0.00 - 0.04 K/uL Final    Comment: Mild elevation in immature granulocytes is non specific and   can be seen in a variety of conditions including stress response,   acute inflammation, trauma and pregnancy. Correlation with other   laboratory and clinical findings is essential.      Lymph # 01/24/2024 1.8  1.0 - 4.8 K/uL Final    Mono # 01/24/2024 0.4  0.3 - 1.0 K/uL Final    Eos # 01/24/2024 0.2  0.0 - 0.5 K/uL Final    Baso # 01/24/2024 0.07  0.00 - 0.20 K/uL Final    nRBC 01/24/2024 0  0 /100 WBC Final    Gran % 01/24/2024 57.2  38.0 - 73.0 % Final    Lymph % 01/24/2024 31.4  18.0 - 48.0 % Final    Mono % 01/24/2024 6.7  4.0 - 15.0 % Final    Eosinophil % 01/24/2024 3.3  0.0 - 8.0 % Final    Basophil % 01/24/2024 1.2  0.0 - 1.9 % Final    Differential Method 01/24/2024 Automated   Final    Sodium 01/24/2024 141  136 - 145 mmol/L Final    Potassium 01/24/2024 3.7  3.5 - 5.1 mmol/L Final    Chloride 01/24/2024 107  95 - 110 mmol/L Final    CO2 01/24/2024 26  23 - 29 mmol/L Final    Glucose 01/24/2024 87  70 - 110 mg/dL Final    BUN 01/24/2024 17  6 - 20 mg/dL Final    Creatinine 01/24/2024 0.9  0.5 - 1.4 mg/dL Final    Calcium 01/24/2024 9.5  8.7 - 10.5 mg/dL Final    Total Protein 01/24/2024 7.3  6.0 - 8.4 g/dL Final    Albumin 01/24/2024 3.2 (L)  3.5 - 5.2 g/dL Final    Total Bilirubin 01/24/2024 0.6  0.1 - 1.0 mg/dL Final    Comment: For infants and newborns, interpretation of results should be based  on gestational age, weight and in agreement with clinical  observations.    Premature Infant recommended reference ranges:  Up to 24 hours.............<8.0 mg/dL  Up to 48 hours............<12.0 mg/dL  3-5 days..................<15.0 mg/dL  6-29 days.................<15.0 mg/dL       Alkaline Phosphatase 01/24/2024 96  55 - 135 U/L Final    AST 01/24/2024 16  10 - 40 U/L Final    ALT 01/24/2024 10  10 - 44 U/L Final    eGFR 01/24/2024 >60.0  >60 mL/min/1.73 m^2 Final    Anion Gap 01/24/2024 8  8 - 16 mmol/L Final    LD 01/24/2024 198  110 - 260 U/L Final    Results are increased in hemolyzed samples.    [place] Free Light Chains 01/24/2024 4.72 (H)  0.33 - 1.94 mg/dL Final    Lambda Free Light Chains 01/24/2024 2.58  0.57 - 2.63 mg/dL Final    Kappa/Lambda FLC Ratio 01/24/2024 1.83 (H)  0.26 - 1.65 Final    Comment: Undetected antigen excess is a rare event but cannot   be excluded. If these free light chain results do not   agree with other clinical or laboratory findings or   if the sample is from a patient that has previously   demonstrated antigen excess, discuss with the testing   laboratory.   Results should always be interpreted in conjunction   with other laboratory tests and clinical evidence.      Immunofix Interp. 01/24/2024 SEE COMMENT   Final    Comment: Serum protein electrophoresis and immunofixation results should be   interpreted in clinical context in that some therapeutic agents can   result   in false positive results (example, daratumumab). Correlation with   the   patient s therapeutic regimen is required.  See pathologist's interpretation.      Protein, Serum 01/24/2024 7.0  6.0 - 8.4 g/dL Final    Comment: Serum protein electrophoresis and immunofixation results should be   interpreted in clinical context in that some therapeutic agents can   result   in false positive results (example, daratumumab). Correlation with   the   patient s therapeutic regimen is required.      Albumin 01/24/2024 3.61  3.35 - 5.55 g/dL Final    Alpha-1 01/24/2024 0.33  0.17 - 0.41 g/dL Final    Alpha-2 01/24/2024 0.76  0.43 - 0.99 g/dL Final    Beta 01/24/2024 0.94  0.50 - 1.10 g/dL Final    Gamma 01/24/2024 1.37  0.67 - 1.58 g/dL Final    IgG 01/24/2024 1486  650 - 1600 mg/dL Final    IgG Cord  Blood Reference Range: 650-1600 mg/dL.    IgA 01/24/2024 337  40 - 350 mg/dL Final    IgA Cord Blood Reference Range: <5 mg/dL.    IgM 01/24/2024 135  50 - 300 mg/dL Final    IgM Cord Blood Reference Range: <25 mg/dL.    Pathologist Interpretation NEYMAR 01/24/2024 REVIEWED   Final    Comment:   Electronically reviewed and signed by:  Quincy Abrams M.D.  Signed on 01/25/24 at 16:34  No monoclonal peaks identified.      Pathologist Interpretation SPE 01/24/2024 REVIEWED   Final    Comment:   Electronically reviewed and signed by:  Quincy Abrams M.D.  Signed on 01/25/24 at 16:34  Normal total protein.  No monoclonal peaks identified.        Assessment and Plan   Microcytic Anemia  Hx of JAZMIN secondary to menorrhagia; no perimenopausal; also had some blood in her stool after straining  Gamma globulin gap 4.1; MM workup showed elevated K/L ratio but non monoclonal peaks; need to r/o light chain disease with urine studies  The anemia and light chain elevation may be due to underlying inflammatory disorder which she is currently having evaluated by rheumatology  Need to collect urine studies  Needs GI follow up  Continue PO iron      Thrombocytosis  Likely reactive in the setting of infection      Cancer Screening  MMG 03/02/2023: BIRADS 1  PAP Smear 02/14/2023: Negative for intraepithelial lesion or malignancy  Colonoscopy 07/23/2021: sigmoid diverticulosis; transverse colon TA      Chronic Medical Conditions  HTN  DJD cervical spine  Vitamin D Deficiency      Med Onc Chart Routing      Follow up with physician 2 months. review urine studies   Follow up with NISHANT    Infusion scheduling note    Injection scheduling note    Labs CBC, CMP, ferritin, iron and TIBC and UPEP   Scheduling:  Preferred lab:  Lab interval:     Imaging    Pharmacy appointment    Other referrals                        The patient was seen, interviewed and examined. Pertinent lab and radiologic studies were reviewed. Pt instructed to call should they develop  concerning signs/symptoms or have further questions.        Portions of the record may have been created with voice recognition software. Occasional wrong-word or sound-a-like substitutions may have occurred due to the inherent limitations of voice recognition software. Read the chart carefully and recognize, using context, where substitutions have occurred.      Valery Hussein MD    Hematology/Oncology

## 2024-01-25 LAB
ALBUMIN SERPL BCP-MCNC: 3.2 G/DL (ref 3.5–5.2)
ALBUMIN SERPL ELPH-MCNC: 3.61 G/DL (ref 3.35–5.55)
ALP SERPL-CCNC: 96 U/L (ref 55–135)
ALPHA1 GLOB SERPL ELPH-MCNC: 0.33 G/DL (ref 0.17–0.41)
ALPHA2 GLOB SERPL ELPH-MCNC: 0.76 G/DL (ref 0.43–0.99)
ALT SERPL W/O P-5'-P-CCNC: 10 U/L (ref 10–44)
ANION GAP SERPL CALC-SCNC: 8 MMOL/L (ref 8–16)
AST SERPL-CCNC: 16 U/L (ref 10–40)
B-GLOBULIN SERPL ELPH-MCNC: 0.94 G/DL (ref 0.5–1.1)
B2 MICROGLOB SERPL-MCNC: 1.8 UG/ML (ref 0–2.5)
BILIRUB SERPL-MCNC: 0.6 MG/DL (ref 0.1–1)
BUN SERPL-MCNC: 17 MG/DL (ref 6–20)
CALCIUM SERPL-MCNC: 9.5 MG/DL (ref 8.7–10.5)
CHLORIDE SERPL-SCNC: 107 MMOL/L (ref 95–110)
CO2 SERPL-SCNC: 26 MMOL/L (ref 23–29)
CREAT SERPL-MCNC: 0.9 MG/DL (ref 0.5–1.4)
EST. GFR  (NO RACE VARIABLE): >60 ML/MIN/1.73 M^2
GAMMA GLOB SERPL ELPH-MCNC: 1.37 G/DL (ref 0.67–1.58)
GLUCOSE SERPL-MCNC: 87 MG/DL (ref 70–110)
IGA SERPL-MCNC: 337 MG/DL (ref 40–350)
IGG SERPL-MCNC: 1486 MG/DL (ref 650–1600)
IGM SERPL-MCNC: 135 MG/DL (ref 50–300)
INTERPRETATION SERPL IFE-IMP: NORMAL
KAPPA LC SER QL IA: 4.72 MG/DL (ref 0.33–1.94)
KAPPA LC/LAMBDA SER IA: 1.83 (ref 0.26–1.65)
LAMBDA LC SER QL IA: 2.58 MG/DL (ref 0.57–2.63)
LDH SERPL L TO P-CCNC: 198 U/L (ref 110–260)
PATHOLOGIST INTERPRETATION IFE: NORMAL
PATHOLOGIST INTERPRETATION SPE: NORMAL
POTASSIUM SERPL-SCNC: 3.7 MMOL/L (ref 3.5–5.1)
PROT SERPL-MCNC: 7 G/DL (ref 6–8.4)
PROT SERPL-MCNC: 7.3 G/DL (ref 6–8.4)
SODIUM SERPL-SCNC: 141 MMOL/L (ref 136–145)

## 2024-01-27 PROBLEM — Z86.2 HISTORY OF THROMBOCYTOSIS: Status: ACTIVE | Noted: 2024-01-27

## 2024-02-02 ENCOUNTER — LAB VISIT (OUTPATIENT)
Dept: LAB | Facility: HOSPITAL | Age: 54
End: 2024-02-02
Attending: STUDENT IN AN ORGANIZED HEALTH CARE EDUCATION/TRAINING PROGRAM
Payer: COMMERCIAL

## 2024-02-02 ENCOUNTER — OFFICE VISIT (OUTPATIENT)
Dept: RHEUMATOLOGY | Facility: CLINIC | Age: 54
End: 2024-02-02
Payer: COMMERCIAL

## 2024-02-02 VITALS
HEIGHT: 64 IN | DIASTOLIC BLOOD PRESSURE: 86 MMHG | BODY MASS INDEX: 28.83 KG/M2 | HEART RATE: 84 BPM | WEIGHT: 168.88 LBS | SYSTOLIC BLOOD PRESSURE: 131 MMHG

## 2024-02-02 DIAGNOSIS — M79.89 SWELLING OF RIGHT HAND: Primary | ICD-10-CM

## 2024-02-02 DIAGNOSIS — M79.89 SWELLING OF RIGHT HAND: ICD-10-CM

## 2024-02-02 DIAGNOSIS — E79.0 HYPERURICEMIA: ICD-10-CM

## 2024-02-02 DIAGNOSIS — R79.89 ELEVATED PROCALCITONIN: ICD-10-CM

## 2024-02-02 LAB
ALBUMIN SERPL BCP-MCNC: 3.3 G/DL (ref 3.5–5.2)
ALP SERPL-CCNC: 88 U/L (ref 55–135)
ALT SERPL W/O P-5'-P-CCNC: 9 U/L (ref 10–44)
ANION GAP SERPL CALC-SCNC: 8 MMOL/L (ref 8–16)
AST SERPL-CCNC: 14 U/L (ref 10–40)
BILIRUB SERPL-MCNC: 0.4 MG/DL (ref 0.1–1)
BUN SERPL-MCNC: 21 MG/DL (ref 6–20)
CALCIUM SERPL-MCNC: 9.5 MG/DL (ref 8.7–10.5)
CHLORIDE SERPL-SCNC: 105 MMOL/L (ref 95–110)
CO2 SERPL-SCNC: 25 MMOL/L (ref 23–29)
CREAT SERPL-MCNC: 1 MG/DL (ref 0.5–1.4)
CRP SERPL-MCNC: 4 MG/L (ref 0–8.2)
ERYTHROCYTE [SEDIMENTATION RATE] IN BLOOD BY PHOTOMETRIC METHOD: 99 MM/HR (ref 0–36)
EST. GFR  (NO RACE VARIABLE): >60 ML/MIN/1.73 M^2
GLUCOSE SERPL-MCNC: 90 MG/DL (ref 70–110)
POTASSIUM SERPL-SCNC: 3.7 MMOL/L (ref 3.5–5.1)
PROCALCITONIN SERPL IA-MCNC: 0.03 NG/ML
PROT SERPL-MCNC: 7.1 G/DL (ref 6–8.4)
SODIUM SERPL-SCNC: 138 MMOL/L (ref 136–145)
URATE SERPL-MCNC: 5.8 MG/DL (ref 2.4–5.7)

## 2024-02-02 PROCEDURE — 81381 HLA I TYPING 1 ALLELE HR: CPT | Performed by: STUDENT IN AN ORGANIZED HEALTH CARE EDUCATION/TRAINING PROGRAM

## 2024-02-02 PROCEDURE — 1159F MED LIST DOCD IN RCRD: CPT | Mod: CPTII,S$GLB,, | Performed by: STUDENT IN AN ORGANIZED HEALTH CARE EDUCATION/TRAINING PROGRAM

## 2024-02-02 PROCEDURE — 84145 PROCALCITONIN (PCT): CPT | Performed by: STUDENT IN AN ORGANIZED HEALTH CARE EDUCATION/TRAINING PROGRAM

## 2024-02-02 PROCEDURE — 3075F SYST BP GE 130 - 139MM HG: CPT | Mod: CPTII,S$GLB,, | Performed by: STUDENT IN AN ORGANIZED HEALTH CARE EDUCATION/TRAINING PROGRAM

## 2024-02-02 PROCEDURE — 3079F DIAST BP 80-89 MM HG: CPT | Mod: CPTII,S$GLB,, | Performed by: STUDENT IN AN ORGANIZED HEALTH CARE EDUCATION/TRAINING PROGRAM

## 2024-02-02 PROCEDURE — 3008F BODY MASS INDEX DOCD: CPT | Mod: CPTII,S$GLB,, | Performed by: STUDENT IN AN ORGANIZED HEALTH CARE EDUCATION/TRAINING PROGRAM

## 2024-02-02 PROCEDURE — 99215 OFFICE O/P EST HI 40 MIN: CPT | Mod: S$GLB,,, | Performed by: STUDENT IN AN ORGANIZED HEALTH CARE EDUCATION/TRAINING PROGRAM

## 2024-02-02 PROCEDURE — 85652 RBC SED RATE AUTOMATED: CPT | Performed by: STUDENT IN AN ORGANIZED HEALTH CARE EDUCATION/TRAINING PROGRAM

## 2024-02-02 PROCEDURE — 99999 PR PBB SHADOW E&M-EST. PATIENT-LVL IV: CPT | Mod: PBBFAC,,, | Performed by: STUDENT IN AN ORGANIZED HEALTH CARE EDUCATION/TRAINING PROGRAM

## 2024-02-02 PROCEDURE — 85025 COMPLETE CBC W/AUTO DIFF WBC: CPT | Performed by: STUDENT IN AN ORGANIZED HEALTH CARE EDUCATION/TRAINING PROGRAM

## 2024-02-02 PROCEDURE — 84550 ASSAY OF BLOOD/URIC ACID: CPT | Performed by: STUDENT IN AN ORGANIZED HEALTH CARE EDUCATION/TRAINING PROGRAM

## 2024-02-02 PROCEDURE — 80053 COMPREHEN METABOLIC PANEL: CPT | Performed by: STUDENT IN AN ORGANIZED HEALTH CARE EDUCATION/TRAINING PROGRAM

## 2024-02-02 PROCEDURE — 86140 C-REACTIVE PROTEIN: CPT | Performed by: STUDENT IN AN ORGANIZED HEALTH CARE EDUCATION/TRAINING PROGRAM

## 2024-02-02 PROCEDURE — 1160F RVW MEDS BY RX/DR IN RCRD: CPT | Mod: CPTII,S$GLB,, | Performed by: STUDENT IN AN ORGANIZED HEALTH CARE EDUCATION/TRAINING PROGRAM

## 2024-02-02 RX ORDER — NAPROXEN 500 MG/1
500 TABLET ORAL 2 TIMES DAILY
Qty: 60 TABLET | Refills: 0 | Status: SHIPPED | OUTPATIENT
Start: 2024-02-02 | End: 2024-05-03

## 2024-02-02 NOTE — PROGRESS NOTES
Subjective:      Patient ID: Dana Winter is a 53 y.o. female.    Chief Complaint: 6 week follow up (On right hand pain and swelling)    HPI:   Interval Hx:  The right hand is about the same as the last time I saw her. She still has pain and swelling in the right 2nd-4th digits and MCPs. She still can't make a fist with the right hand. No other joints have been involved. Since I last saw her, the only new medication is iron supplements. She got off the NSAIDs when she had MARCO last visit. She completed 7 days of antibiotics for PNA in 12/2023. No further steroids were taken since last visit. In the meantime, her platelet count, kidney function, and CRP have returned to normal. ESR still elevated at 63 last week but improved from prior.            Initial Hx:  Patient is a 54 yo F who presents for right hand pain and swelling. Patient is not a great historian so some history taken from the chart. Symptoms started just before 11/29/2023 when she was reaching in the washing machine and felt right back muscle strain. 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 A few days later, she noticed right dorsal hand swelling extending to the right fingers. Associated warmth, erythema, significant pain. She showed me a photo on her phone dated 12/4/2023:      She presented to Internal Medicine on 12/4/23 and was given a toradol IM injection and prednisone taper (40 mg for 2 days and taper by 10 mg every 2 days) for presumed gout. At that time uric acid was 11 and CRP was 424. She reports not much improvement with the prednisone taper though her CRP improved to 234 by 12/13/2023. Then on 12/15/2023, she saw her PCP and got Voltaren gel and lab work was done showing worsened anemia (chronic), platelets >1000 (clotted?), procalcitonin 1.34, Cr 1.7 with potassium 5.9 (also high on  "12/13/23 and last checked in 1/2023 when it was normal). She went to get x-ray of her hand on 12/15/23 and was short of breath in radiology department so a chest x-ray was done and showed some concern for pneumonia. Therefore she was given Augmentin and doxycycline. She reports improvement in the right hand symptoms since then.    During all this, she hasn't taken much for the pain. Naproxen for pain once nightly for some time. Used ibuprofen once or twice.    She endorses unintentional weight loss of 22 lbs in 5 months.    She has never been diagnosed with gout and no family history of gout. No previous joint swelling and pain attacks.  History of kidney stones: no  Family history of gout: no  Previous gout medications: no  Alcohol use: no  High fructose corn syrup use: yes, lemonade, sweet tea, slushies  Low purine diet: no    No skin rashes, malar rash, photosensitivity   No telangiectasias   No calcinosis   No psoriasis   No patchy alopecia   No oral or nasal ulcers   No dry eyes or dry mouth   No pleurisy, chest pain, dyspnea, cough  No dysphagia, diplopia, dysphonia  No muscle weakness   No Raynaud's  No digital ulcers   No cytopenias   No renal issues   No blood clots   No fever, chills, night sweats, or loss of appetite. +weight loss  No new onset headaches   No recurrent conjunctivitis, uveitis, scleritis, or episcleritis   No chronic or bloody diarrhea. No Ulcerative Colitis or Chron's (IBD)  No vaginal or penile and urethral discharge/STDs/ulcers   No unexplained lymphadenopathy  No parotitis   No seizures, strokes, psychosis  No sclerodactyly  No perioral tightness     Social Hx: never smoker, no drug use, no alcohol  Family Hx: No family history of autoimmune disease    Objective:   /86   Pulse 84   Ht 5' 4" (1.626 m)   Wt 76.6 kg (168 lb 14 oz)   BMI 28.99 kg/m²   Physical Exam  Constitutional:       General: She is not in acute distress.     Appearance: Normal appearance.   HENT:      Head: " Normocephalic and atraumatic.      Mouth/Throat:      Mouth: Mucous membranes are moist.      Pharynx: Oropharynx is clear.   Cardiovascular:      Rate and Rhythm: Normal rate and regular rhythm.   Pulmonary:      Effort: Pulmonary effort is normal.      Breath sounds: Normal breath sounds.   Abdominal:      Palpations: Abdomen is soft.      Tenderness: There is no abdominal tenderness.   Musculoskeletal:         General: Swelling and tenderness present.      Cervical back: Normal range of motion. No tenderness.      Comments: Tenosynovitis of right 2nd-4th fingers. No other joint pain or swelling. Can make 20% fist with the right hand and full fist with the left hand.   Skin:     General: Skin is warm and dry.   Neurological:      Mental Status: She is alert and oriented to person, place, and time. Mental status is at baseline.           Assessment:     1. Swelling of right hand    2. Hyperuricemia    3. Elevated procalcitonin          Plan:     Problem List Items Addressed This Visit          Unprioritized    Hyperuricemia    Relevant Orders    CBC Auto Differential    Comprehensive Metabolic Panel    C-Reactive Protein    Sedimentation rate    Uric Acid    Misc Sendout Test, Blood HLA     Procalcitonin    Ambulatory referral/consult to Hand Surgery     Other Visit Diagnoses       Swelling of right hand    -  Primary    Relevant Medications    naproxen (NAPROSYN) 500 MG tablet    Other Relevant Orders    CBC Auto Differential    Comprehensive Metabolic Panel    C-Reactive Protein    Sedimentation rate    Uric Acid    Misc Sendout Test, Blood HLA     Procalcitonin    Ambulatory referral/consult to Hand Surgery    Elevated procalcitonin        Relevant Orders    CBC Auto Differential    Comprehensive Metabolic Panel    C-Reactive Protein    Sedimentation rate    Uric Acid    Misc Sendout Test, Blood HLA     Procalcitonin    Ambulatory referral/consult to Hand Surgery          Patient is a 54 yo F who  presents for follow up for right hand pain and swelling. Symptoms started end of November 2023, a few days after hurting her right hand by dropping a heavy box of mail at work.    MRI hand w/wo contrast 12/21/2023:  1.    Negative for evidence of os myelitis.  Scattered degenerative changes noted.  2.    Exuberant joint effusion fourth MP joint, nonspecific.  Inflammatory arthropathy possible.  Septic arthritis thought less likely.  3.    Extensor and flexor tenosynovitis with exuberant tenosynovitis of the flexor tendons of the index and ring fingers.  Additional exuberant soft tissue edema throughout the index and ring fingers.  No focal fluid collections to suggest abscess.    Not much improvement with prednisone in 12/2023. She also had PNA in 12/2023 and took Augmentin and doxycycline for 7 days and the right hand symptoms improved significantly. At this point she has residual tenosynovitis in the right 2nd-4th fingers and no other joint involvement. Uric acid was 11. ESR and CRP were significantly elevated in 12/2023 and now improving. Negative RF, CCP, MAURI.    Impression: Highest on my differential is tenosynovitis secondary to trauma, infection, or gout. Too early to say this is an autoimmune process since no other joints are involved and no extra-articular symptoms. This might take several more weeks to resolve if it is a gout attack stimulated by trauma. I will not give more steroids since it didn't help and I can't rule out infection. Will check labs today, treat with anti-inflammatory (since kidney function is back to normal), and refer to Hand Surgery to see if they can get a tissue sample or synovial sample that we can test for infection or inflammatory disease.       Plan:  Labs: CBC, CMP, ESR, CRP, uric acid, HLA , procalcitonin  Take naproxen 500 mg BID for 10 days  Referral to Hand Surgery for fluid/tissue sample for cell counts, crystals, gram stain, culture, AFB staining and culture  Follow  up in 3 months with standing labs and will treat for gout or inflammatory arthritis as the disease develops.      Follow up in about 3 months (around 5/2/2024).

## 2024-02-02 NOTE — PATIENT INSTRUCTIONS
Labs today  Take naproxen 500 mg twice a day for 10 days  Referral to Hand Surgery for fluid sample  Follow up in 3 months with standing labs and will treat for gout or inflammatory arthritis as the disease develops.

## 2024-02-03 LAB
BASOPHILS # BLD AUTO: 0.06 K/UL (ref 0–0.2)
BASOPHILS NFR BLD: 1 % (ref 0–1.9)
DIFFERENTIAL METHOD BLD: ABNORMAL
EOSINOPHIL # BLD AUTO: 0.1 K/UL (ref 0–0.5)
EOSINOPHIL NFR BLD: 1.6 % (ref 0–8)
ERYTHROCYTE [DISTWIDTH] IN BLOOD BY AUTOMATED COUNT: 17.7 % (ref 11.5–14.5)
HCT VFR BLD AUTO: 32.4 % (ref 37–48.5)
HGB BLD-MCNC: 10.2 G/DL (ref 12–16)
IMM GRANULOCYTES # BLD AUTO: 0.01 K/UL (ref 0–0.04)
IMM GRANULOCYTES NFR BLD AUTO: 0.2 % (ref 0–0.5)
LYMPHOCYTES # BLD AUTO: 1.9 K/UL (ref 1–4.8)
LYMPHOCYTES NFR BLD: 30.5 % (ref 18–48)
MCH RBC QN AUTO: 26 PG (ref 27–31)
MCHC RBC AUTO-ENTMCNC: 31.5 G/DL (ref 32–36)
MCV RBC AUTO: 83 FL (ref 82–98)
MONOCYTES # BLD AUTO: 0.5 K/UL (ref 0.3–1)
MONOCYTES NFR BLD: 7.8 % (ref 4–15)
NEUTROPHILS # BLD AUTO: 3.7 K/UL (ref 1.8–7.7)
NEUTROPHILS NFR BLD: 58.9 % (ref 38–73)
NRBC BLD-RTO: 0 /100 WBC
PLATELET # BLD AUTO: 345 K/UL (ref 150–450)
PMV BLD AUTO: 9.5 FL (ref 9.2–12.9)
RBC # BLD AUTO: 3.92 M/UL (ref 4–5.4)
WBC # BLD AUTO: 6.19 K/UL (ref 3.9–12.7)

## 2024-02-14 ENCOUNTER — OFFICE VISIT (OUTPATIENT)
Dept: ORTHOPEDICS | Facility: CLINIC | Age: 54
End: 2024-02-14
Payer: COMMERCIAL

## 2024-02-14 VITALS — BODY MASS INDEX: 28.83 KG/M2 | HEIGHT: 64 IN | WEIGHT: 168.88 LBS

## 2024-02-14 DIAGNOSIS — M79.89 SWELLING OF RIGHT HAND: ICD-10-CM

## 2024-02-14 DIAGNOSIS — M25.641 STIFFNESS OF RIGHT HAND JOINT: Primary | ICD-10-CM

## 2024-02-14 DIAGNOSIS — R79.89 ELEVATED PROCALCITONIN: ICD-10-CM

## 2024-02-14 DIAGNOSIS — E79.0 HYPERURICEMIA: ICD-10-CM

## 2024-02-14 PROCEDURE — 1159F MED LIST DOCD IN RCRD: CPT | Mod: CPTII,S$GLB,, | Performed by: STUDENT IN AN ORGANIZED HEALTH CARE EDUCATION/TRAINING PROGRAM

## 2024-02-14 PROCEDURE — 99204 OFFICE O/P NEW MOD 45 MIN: CPT | Mod: S$GLB,,, | Performed by: STUDENT IN AN ORGANIZED HEALTH CARE EDUCATION/TRAINING PROGRAM

## 2024-02-14 PROCEDURE — 1160F RVW MEDS BY RX/DR IN RCRD: CPT | Mod: CPTII,S$GLB,, | Performed by: STUDENT IN AN ORGANIZED HEALTH CARE EDUCATION/TRAINING PROGRAM

## 2024-02-14 PROCEDURE — 99999 PR PBB SHADOW E&M-EST. PATIENT-LVL IV: CPT | Mod: PBBFAC,,, | Performed by: STUDENT IN AN ORGANIZED HEALTH CARE EDUCATION/TRAINING PROGRAM

## 2024-02-14 PROCEDURE — 3008F BODY MASS INDEX DOCD: CPT | Mod: CPTII,S$GLB,, | Performed by: STUDENT IN AN ORGANIZED HEALTH CARE EDUCATION/TRAINING PROGRAM

## 2024-02-16 NOTE — PROGRESS NOTES
Hand Surgery Clinic Note    Chief Complaint  Chief Complaint   Patient presents with    Right Hand - Pain, Swelling    Right Wrist - Pain       History of Present Illness  53-year-old right-hand dominant female who has a  at the post.  His presents for evaluation pain and swelling in the right hand and wrist for approximately 3 months.  She is unsure if this was related to an injury.  There was an incident where tray with falling at work and she tried to catch it.  Shortly following this incident she noticed the hand began to swell.  Her pain level is a 5/10.  She has been previously treated by Rheumatology.  The swelling has been gradually improving but has not resolve completely at this point.  She is developed stiffness in her hand.  She was unable to make a fist.  She was treated with steroids at 1 point which did not help much.  The pain is not localized to any specific area of rather is throughout her entire hand.    Review of Systems  Review of systems negative for chest pain, shortness of breath, fevers, chills, nausea/vomiting.    Past Medical History  Past Medical History:   Diagnosis Date    DJD (degenerative joint disease) of cervical spine     on PT    Hypertension        Past Surgical History  Past Surgical History:   Procedure Laterality Date    APPENDECTOMY      COLONOSCOPY N/A 7/23/2021    Procedure: COLONOSCOPY;  Surgeon: Michelle Harry MD;  Location: [facility];  Service: Endoscopy;  Laterality: N/A;    LAPAROSCOPIC APPENDECTOMY      TUBAL LIGATION         Allergies  Review of patient's allergies indicates:  No Known Allergies    Family History  Family History   Problem Relation Age of Onset    Hypertension Father     Stroke Father     Heart attack Father        Social History  Social History     Socioeconomic History    Marital status:     Number of children: 4   Occupational History    Occupation: [employer]     Employer: [employer]   Tobacco Use    Smoking status: Never     Passive  exposure: Never    Smokeless tobacco: Never   Substance and Sexual Activity    Alcohol use: No    Drug use: No    Sexual activity: Yes     Partners: Male     Birth control/protection: Surgical     Comment: Tubal       Vital Signs  There were no vitals filed for this visit.    Physical Exam  Constitutional: Appears well-developed and well-nourished. No distress.   HENT:   Head: Normocephalic.   Eyes: EOM are normal.   Pulmonary/Chest: Effort normal.   Neurological: Oriented to person, place, and time.   Psychiatric: Normal mood and affect.     Right Upper Extremity:  No abrasions, lacerations, wounds.  No erythema.  No drainage.  Patient was able to extend all her fingers.  She has mild swelling generalized throughout the dorsal hand as well as involving the index, middle, ring, and small fingers.  She does not have any pain with palpation anywhere throughout her hand whether that be along the IP and MCP joints of all 5 fingers or along the flexor sheath or along the extensor tendons or in the palm.  There is no palpable effusion at the wrist, MCP joints, or IP joints.  She has notable stiffness in the index, middle, and ring fingers.  Index finger active motion:  MCP 0-80 degrees, PIP 0-40 degrees, DIP 0-20 degrees.  Middle finger active motion:  MCP 0-40 degrees, PIP 0-80 degrees, DIP 0-30 degrees.  Ring finger active motion:  MCP 0-30 degrees, PIP 0 70°, DIP 0-20 degrees.  Sensation is intact in the median, radial, and ulnar nerve distributions.  Palpable radial pulse.        Imaging  Right-hand x-rays three views were obtained on 12/15/2023 and independently reviewed by myself.  Mild arthritic changes noted at the IP joint of the thumb.  Soft tissue swelling is noted throughout the hand.  No fracture.  No dislocation or subluxation.  No foreign body.    MRI of the right hand with and without contrast was obtained on 12/21/2023.  There is a 4th MCP effusion noted.  There tenosynovitis of the flexor tendons of the  index, middle, and ring fingers noted.  There is circumferential soft tissue edema throughout the index finger.    Assessment and Plan  53-year-old right-hand dominant female presents to clinic with right hand and wrist swelling for 3 months of unknown etiology.  I do think that the swelling, while not completely resolved, is significantly improved compared to previous pictures in the chart.  Additionally on patient's MRI she had findings of a 4th MCP effusion which is no longer present based on my physical exam.  Her main issue today is that she was develop significant stiffness in her hand.  I would like for her to work with hand therapy on improving her range of motion as well as edema control.  I will discuss with rheumatology any further treatment and we will work together to determine how to best proceed.  I discussed with the patient that I will get back to her at some point next week to further discuss a plan.    Genevieve Garcia MD  Orthopaedic Hand Surgery

## 2024-02-21 ENCOUNTER — CLINICAL SUPPORT (OUTPATIENT)
Dept: REHABILITATION | Facility: HOSPITAL | Age: 54
End: 2024-02-21
Attending: STUDENT IN AN ORGANIZED HEALTH CARE EDUCATION/TRAINING PROGRAM
Payer: COMMERCIAL

## 2024-02-21 DIAGNOSIS — M79.641 PAIN OF RIGHT HAND: Primary | ICD-10-CM

## 2024-02-21 DIAGNOSIS — M25.641 STIFFNESS OF RIGHT HAND JOINT: ICD-10-CM

## 2024-02-21 DIAGNOSIS — M79.89 SWELLING OF RIGHT HAND: ICD-10-CM

## 2024-02-21 DIAGNOSIS — M25.641 STIFFNESS OF FINGER JOINT OF RIGHT HAND: ICD-10-CM

## 2024-02-21 PROCEDURE — 97166 OT EVAL MOD COMPLEX 45 MIN: CPT

## 2024-02-21 PROCEDURE — 97110 THERAPEUTIC EXERCISES: CPT

## 2024-02-21 PROCEDURE — 97140 MANUAL THERAPY 1/> REGIONS: CPT

## 2024-02-21 NOTE — PLAN OF CARE
[facility]  Occupational Therapy Initial Evaluation      Name: Dana Winter  Clinic Number: [id number]    Therapy Diagnosis:   Encounter Diagnoses   Name Primary?    Swelling of right hand     Stiffness of right hand joint     Pain of right hand Yes    Stiffness of finger joint of right hand      Physician: Genevieve Garcia MD    Physician Orders: Patient with stiffness and swelling int he right hand  Eval and treat  Work on improving range of motion  Edema control  Modalities PRN  Medical Diagnosis: M79.89 (ICD-10-CM) - Swelling of right hand M25.641 (ICD-10-CM) - Stiffness of right hand joint  Surgical Procedure and Date: None  Evaluation Date: 2/21/2024  Insurance Authorization Period Expiration: 02/14/2025  Plan of Care Certification Period: 02/21/24 to 04/02/24  Progress Note due: 03/20/24  Date of Return to MD: 3/20/24    Visit # / Visits authorized: 1 / pending  FOTO: 1/ 3  Initial=50% / Goal=66%        Precautions:  Standard    Time In: 2:00   Time Out: 3:00  Total Billable Time: 60 minutes    Subjective      Date of Onset: 12/23    History of Current Condition/Mechanism of Injury: Dana reports she was at work and attempted to catch a falling tray of mail and inadvertently injured her R hand.  Since the incident she has had swelling, pain and stiffness in her R hand.  She had a PCP visit with X-rays and an MRI was completed. No abnormal findings were noted.  She completed a follow up with Hand Ortho MD and now is being referred to OT to address deficits.      Falls: [] Yes [x] No Comments:    Involved Side: [x] Right [] Left [] Bilateral  Dominant Side: Right    Mechanism of Injury: [x] Acute Trauma [x] Cumulative/Repetitive Strain Injury [] Congenital   [x] Degenerative Condition   [] ______  Imaging: MRI studies: 1.    Negative for evidence of os myelitis.  Scattered degenerative changes noted. 2.    Exuberant joint effusion fourth MP joint, nonspecific.  Inflammatory  arthropathy possible.  Septic arthritis thought less likely.  3.    Extensor and flexor tenosynovitis with exuberant tenosynovitis of the flexor tendons of the index and ring fingers.  Additional exuberant soft tissue edema throughout the index and ring fingers.  No focal fluid collections to suggest abscess., X-rays: No fracture or dislocation.  Mild arthritic change involving the interphalangeal joint of the thumb.  Soft tissue swelling involving the index finger.  There are some erosive changes involving the second PIP joint uncertain etiology.    Prior Therapy: [] Yes [x] No Comments:    Pain:  Functional Pain Scale Rating 0-10:   3/10 on average  2/10 at best  7/10 at worst  Location: R wrist/hand/fingers  Description: Tingling, Numb, Sharp, and Shooting  Aggravating Factors: Night Time, Lifting, and any resistive activities with R hand, including gripping  Easing Factors: massage, pain medication, rest, elevation, and shaking hands    Occupation:   at the post office  Working presently: employed full-time  Duties: processing mail (lifting, pulling, pushing varioys sized and weighted objects/mail;   Home management tasks, patient is responsible for: [x] Cooking   [x] Cleaning [x] Laundry [] Yard work  [x] Shopping [x] Child/Elderly Care    Functional Limitations/Social History:    Previous functional status includes: Independent with all ADLs.     Current Functional Status   Home/Living environment: lives with their spouse    - [x] One  [] Two story home, 0 steps to enter    - DME: [x] None [] Wheelchair [] Walker [] Bedside Commode      []Shower/Tub Chair [] Cane [] Adaptive Equipment      Limitation of Functional Status as follows:   ADLs/IADLs:     - Feeding: []No []Minimal []Moderate[x]Significant difficulty/pain cutting foods    - Bathing: []No [x]Minimal []Moderate[]Significant difficulty     - Dressing/Grooming: []No []Minimal [x]Moderate[]Significant difficulty with fasteners/laces/accessories    -  "Driving: mostly using L hand; has []No []Minimal [x]Moderate[x]Significant difficulty/discomfort while using R hand     Patient's Goals for Therapy: "get hand working again"    Past Medical History/Physical Systems Review:   Dana Winter  has a past medical history of DJD (degenerative joint disease) of cervical spine and Hypertension.    Dana Winter  has a past surgical history that includes Tubal ligation; Laparoscopic appendectomy; Appendectomy; and Colonoscopy (N/A, 7/23/2021).    Dana has a current medication list which includes the following prescription(s): acetaminophen, diclofenac sodium, ergocalciferol, iron-vitamin c 100-250 mg (icar-c), losartan-hydrochlorothiazide 100-25 mg, and naproxen.    Review of patient's allergies indicates:  No Known Allergies     Objective      Observation/Appearance:  [x] Skin intact; shiny  []Sutures intact [] Surgical incision Healing [] No Signs of Infection [x] Hypertrophic scarring of flexor tendons of the R IF & RF [] Hypersensitivity to touch [x]Edema present in R hand fingers, as noted below  [x] Deformities noted: Mild extension lag of PIP joints, but is correctable with passive motion  [x] Myofascial tightness noted: Mild in R hand intrinsic muscles    Edema: (Measured in centimeters)   2/21/2024 2/21/2024.    Left Right   Wrist Crease 15.8 16.1   Mid palm 18.5 18.7     Edema: (Measured in centimeters)   2/21/2024 2/21/2024    Left Right   Index:       P1 6.2 7.0   Long:       P1 5.9 6.2   Ring:       P1            5.8 6.3   Small:        P1           5.3 5.6     Physical Performance Assessment: (ROM measured in degrees)   Right       AROM  MMT  Pain/Dysfunction with Movement   Wrist      Extension 65 3/5 [x] Pain [] Abnormal Movement   Flexion 70 3/5 [x] Pain [] Abnormal Movement   Radial Deviation 20 3/5 [] Pain [] Abnormal Movement   Ulnar Deviation 35 3/5 [] Pain [] Abnormal Movement     Range Of Motion: (Measured in degrees)  Date 2/21/2024   Right " AROM   Index: MP  0/84              PIP     0/42              DIP 0/19               CII=808   Long:  MP 0/74              PIP 0/87              DIP 0/39               FMG=313   Ring:   MP 0/52              PIP 0/72              DIP 0/20               UDZ=625   Small:  MP 0/45               PIP 0/90               DIP 0/40               PEU=846   Thumb: MP WFL                IP WFL       Rad ADD/ABD WFL       Pal ADD/ABD WFL          Opposition To SF DPC      Sensation:  Patient reports numbness & tingling in B hand/fingers at this time. Light touch, temperature, sharp and dull are grossly intact in R wrist/hand       Strength (Dyanmometer) and Pinch Strength (Pinch Gauge)  Measured in pounds and psi.    2/21/2024 2/21/2024    Left Right   Rung II 50 9, 16, 19   Key Pinch 14 11   3pt Pinch 11.5 8   2pt Pinch 11 6        Intake Outcome Measure for FOTO Hand Survey    Therapist reviewed FOTO scores for Dana Winter on 2/21/2024.   FOTO report - see Media section or FOTO account episode details.    Intake Score: 50%       Treatment      Total Treatment time (time-based codes) separate from Evaluation: 35 minutes    Dana received the treatments listed below:      supervised modalities after being cleared for contradictions: Paraffin to R wrist/hand for 8 min, pre-tx to decrease pain & increase tissue extensibility     manual therapy techniques: Myofacial release, Manual Lymphatic Drainage, and Soft tissue Mobilization were applied to the: R forearm/wrist/hand for 15 minutes, including:  Use of hand techniques, cupping and IASTM tools to increase blood flow/circulation, improve soft tissue pliability and decrease pain.     therapeutic exercises to develop ROM for 12 minutes including:  Performed Initial Home Exercise Program    Patient Education and Home Exercises      Education provided:   - Role of OT, goals for OT, scheduling/cancellations, insurance limitations with patient.  - Established initial Home  Exercise Program, see Patient Instructions for details  - Discussed activity restrictions/limitations, basic joint protection principles and repetitive strain injury prevention in ADL/IADL's    Written Home Exercises Provided: yes.  Exercises were reviewed and Dana was able to demonstrate them prior to the end of the session.    Dana demonstrated good  understanding of the education provided.     Pt was advised to perform these exercises free of pain, and to stop performing them if pain occurs.    See EMR under Patient Instructions for exercises provided 2/21/2024.    Assessment      Dana Winter is a 53 y.o. female referred to outpatient occupational therapy and presents with a medical diagnosis of M79.89 (ICD-10-CM) - Swelling of right hand M25.641 (ICD-10-CM) - Stiffness of right hand joint.    Following medical record review it is determined that pt will benefit from occupational therapy services in order to maximize pain free and/or functional use of right hand. The following goals were discussed with the patient and patient is in agreement with them as to be addressed in the treatment plan. The patient's rehab potential is Good for goals set.     Anticipated barriers to occupational therapy: comorbid diagnosis listed above, compliance with HEP and attendance to therapy as recommended     Plan of care discussed with patient: Yes  Patient's spiritual, cultural and educational needs considered and patient is agreeable to the plan of care and goals as stated below:     Medical Necessity is demonstrated by the following  Occupational Profile/History  Co-morbidities and personal factors that may impact the plan of care [] LOW: Brief chart review  [x] MODERATE: Expanded chart review   [] HIGH: Extensive chart review    Moderate / High Support Documentation: multiple diagnostic reports and physician's notes reviewed     Examination  Performance deficits relating to physical, cognitive or psychosocial skills  that result in activity limitations and/or participation restrictions  [] LOW: addressing 1-3 Performance deficits  [] MODERATE: 3-5 Performance deficits  [x] HIGH: 5+ Performance deficits (please support below)    Moderate / High Support Documentation:    Physical:  Joint Mobility  Muscle Power/Strength  Muscle Endurance  Skin Integrity/Scar Formation  Edema   Strength  Pinch Strength  Fine Motor Coordination  Tactile Functions    Cognitive:  No Deficits    Psychosocial:    No Deficits     Treatment Options [] LOW: Limited options  [x] MODERATE: Several options  [] HIGH: Multiple options      Decision Making/ Complexity Score: moderate       The following goals were discussed with the patient and patient is in agreement with them as to be addressed in the treatment plan.     Goals:    Short Term Goals (to be met in 3 weeks) Goal Status:   1) Establish HEP with patient independent in performing accurately [] Goal Met  [] Part Met   2) Patient will report Pain of 2-3 levels lower than initial measures on the pain scale for worst pain in affected hand  [] Goal Met  [] Part Met   3) Patient will increase AROM in SIDDIQUI's of affected fingers, by at least 5-8 degrees for improved performance with ADL's [] Goal Met  [] Part Met   4) Assess or Increase  Strength of affected hand by 3-5 psi to improve functioning in ADL/IADL's [] Goal Met  [] Part Met   5) Edema in affected wrist/hand is at least .2-.3 cm less than initial measures at evaluation [] Goal Met  [] Part Met   6) Patient will be able to achieve less than or equal to a 10-15% improvement on FOTO survey demonstrating overall improved functional ability with upper extremity.  [] Goal Met  [] Part Met       Long Term Goals (to be met by Discharge) Goal Status:   1) Pain in affected hand will be no greater than 1-2/10 while performing ADL's/IADL's [] Goal Met  [] Part Met   2) Patient will demonstrate WFL AROM in SIDDIQUI's of affected hand/fingers, in all functional  planes of mobility, for improved functioning in ADL/IADL's  [] Goal Met  [] Part Met   3) Increase /pinch Strength in affected hand by at least 15-20% of initial measures for improved performance in ADL/IADL's [] Goal Met  [] Part Met   4) Edema in affected hand/finger is trace to none [] Goal Met  [] Part Met   5) Patient will be able to achieve less than or equal to 66% on Hand FOTO survey demonstrating overall improved functional ability with upper extremity.  [] Goal Met  [] Part Met        Plan     Plan of Care Certification: 02/21/24 to 04/02/24.     Outpatient Occupational Therapy 2-3 times weekly for 6 weeks to include the following interventions: Paraffin, Fluidotherapy, Manual therapy/joint mobilizations, Modalities for pain management, US 3 mhz, Therapeutic exercises/activities., Strengthening, Orthotic Fabrication/Fit/Training, Edema Control, Scar Management, Electrical Modalities, and Joint Protection.    Ravi Brooks OT    Physician's Signature: _________________________________________ Date: ________________

## 2024-02-21 NOTE — PATIENT INSTRUCTIONS
"[facility] - OCCUPATIONAL THERAPY  HOME EXERCISE PROGRAM     Complete the following massages for 3-5 minutes each, 2-3x/day.                       Edema massage  Scar massage    Complete the following exercises with 5-10 repetitions each, 2-3x/day.     AROM: DIP Flexion / Extension  Pinch middle knuckle to prevent bending. Bend end knuckle until stretch is felt.   Hold 3 seconds. Relax. Straighten finger as far as possible.    AROM: PIP Flexion / Extension  Pinch bottom knuckle  to prevent bending. Actively bend middle knuckle until stretch is felt.   Hold 3 seconds. Relax. Straighten finger as far as possible.      AROM: Isolated MCP Flexion / Extension ("Wave")   Bend only your large, bottom knuckles. Hold 3 seconds. Keep the tips of your fingers straight. Straighten fingers.    AROM: Isolated IPJ Flexion / Extension ("Hook")  Bend only your middle and end knuckles. Hold 3 seconds.   Straighten your fingers.     AROM: MCP and PIP Flexion / Extension ("Straight Fist")  Bend your bottom and middle knuckles, keeping the tips of your fingers straight. Try to touch the pads of your fingers on your palm. Hold 3 seconds. Straighten your fingers.     AROM: Composite Flexion / Extension ("Full Fist")  Bend every joint in your hand into a fist. Hold 3 seconds. Straighten your fingers.     AROM: Composte Extension ("Finger Lifts")  Lift your finger off of the table one at a time. Hold 3 seconds. Relax your finger.    AROM: Abduction / Adduction  With hand flat on table, spread all fingers apart, then bring them together as close as possible.    Copyright © I. All rights reserved.     Therapist: Ravi Brooks OT     "

## 2024-02-22 ENCOUNTER — TELEPHONE (OUTPATIENT)
Dept: ORTHOPEDICS | Facility: CLINIC | Age: 54
End: 2024-02-22
Payer: COMMERCIAL

## 2024-02-22 NOTE — TELEPHONE ENCOUNTER
I discussed patient with Dr. Valera. Her swelling has improved significantly over the last few weeks. I would like to have her work with therapy which she recently started. I would like to hold off on biopsy for now. Will have patient return to clinic in 6 weeks for reevaluation. If swelling continues to be an issue, would likely obtain a repeat MRI to reevaluate as her physical exam last week was very different from what the MRI showed back in December.    Called patient two times yesterday and one time today to discuss plan with no answer. Patient does not have voicemail set up on her phone.    Genevieve Garcia MD  Orthopaedic Hand Surgery

## 2024-02-23 LAB
B5801 INTERPRETATION: NORMAL
HLA-B27 RELATED AG QL: NEGATIVE
HLA-B27 RELATED AG QL: NORMAL

## 2024-03-04 ENCOUNTER — CLINICAL SUPPORT (OUTPATIENT)
Dept: REHABILITATION | Facility: HOSPITAL | Age: 54
End: 2024-03-04
Payer: COMMERCIAL

## 2024-03-04 DIAGNOSIS — M25.641 STIFFNESS OF FINGER JOINT OF RIGHT HAND: ICD-10-CM

## 2024-03-04 DIAGNOSIS — M79.89 SWELLING OF RIGHT HAND: ICD-10-CM

## 2024-03-04 DIAGNOSIS — M79.641 PAIN OF RIGHT HAND: Primary | ICD-10-CM

## 2024-03-04 PROCEDURE — 97110 THERAPEUTIC EXERCISES: CPT

## 2024-03-04 PROCEDURE — 97018 PARAFFIN BATH THERAPY: CPT | Mod: 59

## 2024-03-04 PROCEDURE — 97112 NEUROMUSCULAR REEDUCATION: CPT

## 2024-03-04 PROCEDURE — 97140 MANUAL THERAPY 1/> REGIONS: CPT

## 2024-03-04 NOTE — PROGRESS NOTES
[facility]  Occupational Therapy Treatment Note     Date: 3/4/2024  Name: Dana Winter  Clinic Number: [id number]    Therapy Diagnosis:   Encounter Diagnoses   Name Primary?    Pain of right hand Yes    Swelling of right hand     Stiffness of finger joint of right hand      Physician: Genevieve Garcia MD    Physician Orders: Patient with stiffness and swelling int he right hand  Eval and treat  Work on improving range of motion  Edema control  Modalities PRN  Medical Diagnosis: M79.89 (ICD-10-CM) - Swelling of right hand M25.641 (ICD-10-CM) - Stiffness of right hand joint  Surgical Procedure and Date: None  Evaluation Date: 2/21/2024  Insurance Authorization Period Expiration: 02/14/2025  Plan of Care Certification Period: 02/21/24 to 04/02/24  Progress Note due: 03/20/24  Date of Return to MD: 3/20/24     Visit # / Visits authorized: 1 / 20  FOTO: 1/ 3  Initial=50% / Goal=66%          Precautions:  Standard     Time In: 9:35 am   Time Out: 10:35 am  Total Billable Time: 60 minutes     (billing reflects skilled 1:1 time)      Subjective     Patient reports: no problems & good compliance w/ initial HEP. She does report a history of her fingers locking up at times in her B hand   She was compliant with home exercise program given last session.   Response to previous treatment:No adverse reactions noted or reported   Functional change: None reported at first treatment after initial evaluation     Pain: 3-4/10  Location: right hand      Objective     Objective Measures updated at progress report unless specified.    Treatment     Dana received the treatments listed below:      Supervised modalities after being cleared for contraindications: Paraffin w/ MHP to R wrist/hand hand for 8 min, pre-tx to decrease pain & increase tissue extensibility     Manual therapy techniques: Myofacial release, Manual Lymphatic Drainage, Soft tissue Mobilization, and scar management were applied to  the: R forearm/wrist/hand/fingers for 18 minutes, including:   use of hand techniques, cupping and IASTM tools to increase blood flow/circulation, improve soft tissue pliability and decrease pain.     Therapeutic exercises to develop strength, endurance, and ROM for 25 minutes, including:  A/AAROM     -TGE's (wave, hook, fist) 5 reps each   -Finger Extension lifts-IF-RF 5 reps each   -Isolated IF PIP & DIP-Ext/Flex 5 reps each   -Finger Abd/Add 5 reps     T-putty Yellow/red   -Grasp/Manipulation 3 reps, 5 sec hold    -log rolls w/ tripod & lateral pinch 1 log each   -pancake & extension blooms 2 trials     Neuromuscular re-education activities to improve Coordination and Proprioception for 9 minutes. The following activities were included:  Isospheres  3 min   9 Peg  2 trials         T-putty Yellow/green mix   -molding 3 min      Patient Education and Home Exercises     Education provided:   - Reviewed HEP  - Progress towards goals     Written Home Exercises Provided: Patient instructed to cont prior HEP.  Exercises were reviewed and Dana was able to demonstrate them prior to the end of the session.  Dana demonstrated good  understanding of the home exercise program provided. See electronic medical record under Patient Instructions for exercises provided during therapy sessions.       Assessment     Pain and edema in R hand/fingers is decreasing since initial evaluation.  AROM of composite fist continues to be limited, but has shown some improvement in range since initial evaluation as well.  Patient was able to perform all above listed therapeutic exercises without increased pain or difficulty today.     Dana is progressing well towards her goals and there are no updates to goals at this time. Pt prognosis is Good.     Patient will continue to benefit from skilled outpatient occupational therapy to address the deficits listed in the problem list on initial evaluation provide patient/family education and to  maximize patient's level of independence in the home and community environment.     Patient's spiritual, cultural and educational needs considered and patient agreeable to plan of care and goals.    Anticipated barriers to occupational therapy: comorbid diagnosis, compliance with HEP and attendance to therapy as recommended     Goals:  Short Term Goals (to be met in 3 weeks) Goal Status:   1) Establish HEP with patient independent in performing accurately [] Goal Met  [] Part Met   2) Patient will report Pain of 2-3 levels lower than initial measures on the pain scale for worst pain in affected hand  [] Goal Met  [] Part Met   3) Patient will increase AROM in SIDDIQUI's of affected fingers, by at least 5-8 degrees for improved performance with ADL's [] Goal Met  [] Part Met   4) Assess or Increase  Strength of affected hand by 3-5 psi to improve functioning in ADL/IADL's [] Goal Met  [] Part Met   5) Edema in affected wrist/hand is at least .2-.3 cm less than initial measures at evaluation [] Goal Met  [] Part Met   6) Patient will be able to achieve less than or equal to a 10-15% improvement on FOTO survey demonstrating overall improved functional ability with upper extremity.  [] Goal Met  [] Part Met         Long Term Goals (to be met by Discharge) Goal Status:   1) Pain in affected hand will be no greater than 1-2/10 while performing ADL's/IADL's [] Goal Met  [] Part Met   2) Patient will demonstrate WFL AROM in SIDDIQUI's of affected hand/fingers, in all functional planes of mobility, for improved functioning in ADL/IADL's  [] Goal Met  [] Part Met   3) Increase /pinch Strength in affected hand by at least 15-20% of initial measures for improved performance in ADL/IADL's [] Goal Met  [] Part Met   4) Edema in affected hand/finger is trace to none [] Goal Met  [] Part Met   5) Patient will be able to achieve less than or equal to 66% on Hand FOTO survey demonstrating overall improved functional ability with upper  extremity.  [] Goal Met  [] Part Met         Plan     Plan of Care Certification: 02/21/24 to 04/02/24.      Outpatient Occupational Therapy 2-3 times weekly for 6 weeks to include the following interventions: Paraffin, Fluidotherapy, Manual therapy/joint mobilizations, Modalities for pain management, US 3 mhz, Therapeutic exercises/activities., Strengthening, Orthotic Fabrication/Fit/Training, Edema Control, Scar Management, Electrical Modalities, and Joint Protection.     Updates/Grading for next session: progress ROM, as tolerated    Ravi Brooks, MAGDA   3/4/2024

## 2024-03-06 DIAGNOSIS — Z12.31 OTHER SCREENING MAMMOGRAM: ICD-10-CM

## 2024-03-11 ENCOUNTER — CLINICAL SUPPORT (OUTPATIENT)
Dept: REHABILITATION | Facility: HOSPITAL | Age: 54
End: 2024-03-11
Payer: COMMERCIAL

## 2024-03-11 DIAGNOSIS — M79.89 SWELLING OF RIGHT HAND: ICD-10-CM

## 2024-03-11 DIAGNOSIS — M79.641 PAIN OF RIGHT HAND: Primary | ICD-10-CM

## 2024-03-11 DIAGNOSIS — M25.641 STIFFNESS OF FINGER JOINT OF RIGHT HAND: ICD-10-CM

## 2024-03-11 PROCEDURE — 97035 APP MDLTY 1+ULTRASOUND EA 15: CPT

## 2024-03-11 PROCEDURE — 97140 MANUAL THERAPY 1/> REGIONS: CPT

## 2024-03-11 PROCEDURE — 97110 THERAPEUTIC EXERCISES: CPT

## 2024-03-11 NOTE — PROGRESS NOTES
[facility]  Occupational Therapy Treatment Note     Date: 3/11/2024  Name: Dana Winter  Clinic Number: [id number]    Therapy Diagnosis:   Encounter Diagnoses   Name Primary?    Pain of right hand Yes    Swelling of right hand     Stiffness of finger joint of right hand      Physician: Genevieve Garcia MD    Physician Orders: Patient with stiffness and swelling int he right hand  Eval and treat  Work on improving range of motion  Edema control  Modalities PRN  Medical Diagnosis: M79.89 (ICD-10-CM) - Swelling of right hand M25.641 (ICD-10-CM) - Stiffness of right hand joint  Surgical Procedure and Date: None  Evaluation Date: 2/21/2024  Insurance Authorization Period Expiration: 02/14/2025  Plan of Care Certification Period: 02/21/24 to 04/02/24  Progress Note due: 03/20/24  Date of Return to MD: 3/20/24     Visit # / Visits authorized: 2 / 20  FOTO: 1/ 3  Initial=50% / Goal=66%          Precautions:  Standard     Time In: 9:30 am   Time Out: 10:30 am  Total Billable Time: 60 minutes     (billing reflects skilled 1:1 time)      Subjective     Patient reports: she feels her fingers are bending a little better now.    She was compliant with home exercise program given last session.   Response to previous treatment:No adverse reactions noted or reported   Functional change: None reported at first treatment after initial evaluation     Pain: 3-4/10  Location: right hand      Objective     Objective Measures updated at progress report unless specified.    Treatment     Dana received the treatments listed below:      Direct Contact modalities after being cleared for contraindications: 3.3 MHz, .8 W/cm2, 50% pulsed, 10 min to R IF MCP to PIP, dorsal to volar aspect with time equally split, to decrease pain & edema, increase circulation and tissue extensibility          Manual therapy techniques: Myofacial release, Manual Lymphatic Drainage, Soft tissue Mobilization, and scar management  were applied to the: R forearm/wrist/hand/fingers for 25 minutes, including:   use of hand techniques, cupping and IASTM tools to increase blood flow/circulation, improve soft tissue pliability and decrease pain. Focus placed on sclerotic tissue along the R IF & RF flexor tendon    Therapeutic exercises to develop strength, endurance, and ROM for 25 minutes, including:  A/AAROM     -TGE's (wave, hook, fist) 5 reps each   -Isolated IF PIP & DIP-Ext/Flex 5 reps each       Strengthening:    Digi-flex 3/10 reps, red   PHG 10 reps, setting 3 (29#)   Resistive clothespin w/ tripod & lateral pinch 10 reps each pinch, green       T-putty Yellow/red   -Molding    -Grasp/Manipulation 3 reps, 5 sec hold    -log rolls w/ tripod & lateral pinch 1 log each   -pancake & extension blooms 2 trials   -dowel digs 2 minutes            Patient Education and Home Exercises     Education provided:   - Reviewed HEP  - Added T-putty to HEP, see Patient Instructions for details; - Patient was provided with a supply of yellow/red T-putty today for personal use in therapy and in HEP   - Progress towards goals     Written Home Exercises Provided: Patient instructed to cont prior HEP.  Exercises were reviewed and Dana was able to demonstrate them prior to the end of the session.  Dana demonstrated good  understanding of the home exercise program provided. See electronic medical record under Patient Instructions for exercises provided during therapy sessions.       Assessment     Pain is about the same as last OT visit, but edema in R hand/fingers continues to decrease.  AROM in composite finger flexion is improving with patient able to make a gross fist, but unable to make DIP to galeas contact with R IF.  All other digits (LF-SF are making galeas contact at this time.  Patient was able to perform all above listed therapeutic exercises without increased pain or difficulty today.     Dana is progressing well towards her goals and there are no  updates to goals at this time. Pt prognosis is Good.     Patient will continue to benefit from skilled outpatient occupational therapy to address the deficits listed in the problem list on initial evaluation provide patient/family education and to maximize patient's level of independence in the home and community environment.     Patient's spiritual, cultural and educational needs considered and patient agreeable to plan of care and goals.    Anticipated barriers to occupational therapy: comorbid diagnosis, compliance with HEP and attendance to therapy as recommended     Goals:  Short Term Goals (to be met in 3 weeks) Goal Status:   1) Establish HEP with patient independent in performing accurately [] Goal Met  [] Part Met   2) Patient will report Pain of 2-3 levels lower than initial measures on the pain scale for worst pain in affected hand  [] Goal Met  [] Part Met   3) Patient will increase AROM in SIDDIQUI's of affected fingers, by at least 5-8 degrees for improved performance with ADL's [] Goal Met  [] Part Met   4) Assess or Increase  Strength of affected hand by 3-5 psi to improve functioning in ADL/IADL's [] Goal Met  [] Part Met   5) Edema in affected wrist/hand is at least .2-.3 cm less than initial measures at evaluation [] Goal Met  [] Part Met   6) Patient will be able to achieve less than or equal to a 10-15% improvement on FOTO survey demonstrating overall improved functional ability with upper extremity.  [] Goal Met  [] Part Met         Long Term Goals (to be met by Discharge) Goal Status:   1) Pain in affected hand will be no greater than 1-2/10 while performing ADL's/IADL's [] Goal Met  [] Part Met   2) Patient will demonstrate WFL AROM in SIDDIQUI's of affected hand/fingers, in all functional planes of mobility, for improved functioning in ADL/IADL's  [] Goal Met  [] Part Met   3) Increase /pinch Strength in affected hand by at least 15-20% of initial measures for improved performance in  ADL/IADL's [] Goal Met  [] Part Met   4) Edema in affected hand/finger is trace to none [] Goal Met  [] Part Met   5) Patient will be able to achieve less than or equal to 66% on Hand FOTO survey demonstrating overall improved functional ability with upper extremity.  [] Goal Met  [] Part Met         Plan     Plan of Care Certification: 02/21/24 to 04/02/24.      Outpatient Occupational Therapy 2-3 times weekly for 6 weeks to include the following interventions: Paraffin, Fluidotherapy, Manual therapy/joint mobilizations, Modalities for pain management, US 3 mhz, Therapeutic exercises/activities., Strengthening, Orthotic Fabrication/Fit/Training, Edema Control, Scar Management, Electrical Modalities, and Joint Protection.     Updates/Grading for next session: progress ROM, as tolerated    Ravi Brooks OT   3/11/2024

## 2024-03-11 NOTE — PATIENT INSTRUCTIONS
[facility]  OCCUPATIONAL THERAPY  HOME EXERCISE PROGRAM     Complete the following strengthening program 1x/day.                       _             Ravi Brooks OTL  Occupational Therapist

## 2024-03-13 ENCOUNTER — CLINICAL SUPPORT (OUTPATIENT)
Dept: REHABILITATION | Facility: HOSPITAL | Age: 54
End: 2024-03-13
Payer: COMMERCIAL

## 2024-03-13 DIAGNOSIS — M79.641 PAIN OF RIGHT HAND: Primary | ICD-10-CM

## 2024-03-13 DIAGNOSIS — M25.641 STIFFNESS OF FINGER JOINT OF RIGHT HAND: ICD-10-CM

## 2024-03-13 DIAGNOSIS — M79.89 SWELLING OF RIGHT HAND: ICD-10-CM

## 2024-03-13 PROCEDURE — 97022 WHIRLPOOL THERAPY: CPT

## 2024-03-13 PROCEDURE — 97110 THERAPEUTIC EXERCISES: CPT

## 2024-03-13 PROCEDURE — 97140 MANUAL THERAPY 1/> REGIONS: CPT

## 2024-03-13 NOTE — PROGRESS NOTES
[facility]  Occupational Therapy Treatment Note     Date: 3/11/2024  Name: Dana Winter  Clinic Number: [id number]    Therapy Diagnosis:   Encounter Diagnoses   Name Primary?    Pain of right hand Yes    Swelling of right hand     Stiffness of finger joint of right hand      Physician: Genevieve Garcia MD    Physician Orders: Patient with stiffness and swelling int he right hand  Eval and treat  Work on improving range of motion  Edema control  Modalities PRN  Medical Diagnosis: M79.89 (ICD-10-CM) - Swelling of right hand M25.641 (ICD-10-CM) - Stiffness of right hand joint  Surgical Procedure and Date: None  Evaluation Date: 2/21/2024  Insurance Authorization Period Expiration: 02/14/2025  Plan of Care Certification Period: 02/21/24 to 04/02/24  Progress Note due: 03/20/24  Date of Return to MD: 3/20/24     Visit # / Visits authorized: 3 / 20  FOTO: 1/ 3  Initial=50% / Goal=66%          Precautions:  Standard     Time In: 9:00 am   Time Out: 10:00 am  Total Billable Time: 60 minutes     (billing reflects skilled 1:1 time)      Subjective     Patient reports: no problems or difficulty with newly added T-putty HEP.     She was compliant with home exercise program given last session.   Response to previous treatment:No adverse reactions noted or reported   Functional change: None reported at first treatment after initial evaluation     Pain: 3-4/10  Location: right hand      Objective     Objective Measures updated at progress report unless specified.    Treatment     Dana received the treatments listed below:      Direct Contact modalities after being cleared for contraindications: 3.3 MHz, .8 W/cm2, 50% pulsed, 10 min to R IF MCP to PIP, dorsal to volar aspect with time equally split, to decrease pain & edema, increase circulation and tissue extensibility     Supervised modalities after being cleared for contraindications: Paraffin w/ MHP to R wrist/hand w/ fingers wrapped in  coban in composite finger flexion for 5 min, pre-tx to decrease pain & increase tissue extensibility     Manual therapy techniques: Myofacial release, Manual Lymphatic Drainage, Soft tissue Mobilization, and scar management were applied to the: R forearm/wrist/hand/fingers for 20 minutes, including:   use of hand techniques, cupping and IASTM tools to increase blood flow/circulation, improve soft tissue pliability and decrease pain. Focus placed on sclerotic tissue along the R IF & RF flexor tendon    Therapeutic exercises to develop strength, endurance, and ROM for 25 minutes, including:  A/AAROM     -TGE's (wave, hook, fist) 5 reps each   -Isolated IF PIP & DIP-Ext/Flex 5 reps each       Strengthening:    Digi-flex 3/10 reps, green   PHG 2/10 reps, setting 3 (29#)       T-putty Yellow/red   -dowel digs 3 minutes   -Remove marbles 10 marbles        Patient Education and Home Exercises     Education provided:   - Reviewed ROM & T-putty HEP   - Progress towards goals     Written Home Exercises Provided: Patient instructed to cont prior HEP.  Exercises were reviewed and Dana was able to demonstrate them prior to the end of the session.  Dana demonstrated good  understanding of the home exercise program provided. See electronic medical record under Patient Instructions for exercises provided during therapy sessions.       Assessment     Pain remains the same as last OT visit, but edema in R hand/fingers continues to decrease.  AROM continues to improve with each OT treatment, but limitations continue.  Patient presents with significant weakness in her hand.  Patient was able to perform all above listed therapeutic exercises without increased pain or difficulty today.     Dana is progressing well towards her goals and there are no updates to goals at this time. Pt prognosis is Good.     Patient will continue to benefit from skilled outpatient occupational therapy to address the deficits listed in the problem list on  initial evaluation provide patient/family education and to maximize patient's level of independence in the home and community environment.     Patient's spiritual, cultural and educational needs considered and patient agreeable to plan of care and goals.    Anticipated barriers to occupational therapy: comorbid diagnosis, compliance with HEP and attendance to therapy as recommended     Goals:  Short Term Goals (to be met in 3 weeks) Goal Status:   1) Establish HEP with patient independent in performing accurately [] Goal Met  [] Part Met   2) Patient will report Pain of 2-3 levels lower than initial measures on the pain scale for worst pain in affected hand  [] Goal Met  [] Part Met   3) Patient will increase AROM in SIDDIQUI's of affected fingers, by at least 5-8 degrees for improved performance with ADL's [] Goal Met  [] Part Met   4) Assess or Increase  Strength of affected hand by 3-5 psi to improve functioning in ADL/IADL's [] Goal Met  [] Part Met   5) Edema in affected wrist/hand is at least .2-.3 cm less than initial measures at evaluation [] Goal Met  [] Part Met   6) Patient will be able to achieve less than or equal to a 10-15% improvement on FOTO survey demonstrating overall improved functional ability with upper extremity.  [] Goal Met  [] Part Met         Long Term Goals (to be met by Discharge) Goal Status:   1) Pain in affected hand will be no greater than 1-2/10 while performing ADL's/IADL's [] Goal Met  [] Part Met   2) Patient will demonstrate WFL AROM in SIDDIQUI's of affected hand/fingers, in all functional planes of mobility, for improved functioning in ADL/IADL's  [] Goal Met  [] Part Met   3) Increase /pinch Strength in affected hand by at least 15-20% of initial measures for improved performance in ADL/IADL's [] Goal Met  [] Part Met   4) Edema in affected hand/finger is trace to none [] Goal Met  [] Part Met   5) Patient will be able to achieve less than or equal to 66% on Hand FOTO survey  demonstrating overall improved functional ability with upper extremity.  [] Goal Met  [] Part Met         Plan     Plan of Care Certification: 02/21/24 to 04/02/24.      Outpatient Occupational Therapy 2-3 times weekly for 6 weeks to include the following interventions: Paraffin, Fluidotherapy, Manual therapy/joint mobilizations, Modalities for pain management, US 3 mhz, Therapeutic exercises/activities., Strengthening, Orthotic Fabrication/Fit/Training, Edema Control, Scar Management, Electrical Modalities, and Joint Protection.     Updates/Grading for next session: progress ROM, as tolerated    Ravi Brooks OT   3/11/2024

## 2024-03-25 ENCOUNTER — TELEPHONE (OUTPATIENT)
Dept: INFUSION THERAPY | Facility: HOSPITAL | Age: 54
End: 2024-03-25
Payer: COMMERCIAL

## 2024-03-26 RX ORDER — ERGOCALCIFEROL 1.25 MG/1
50000 CAPSULE ORAL
Qty: 4 CAPSULE | Refills: 3 | Status: SHIPPED | OUTPATIENT
Start: 2024-03-26

## 2024-03-28 ENCOUNTER — OFFICE VISIT (OUTPATIENT)
Dept: INTERNAL MEDICINE | Facility: CLINIC | Age: 54
End: 2024-03-28
Payer: COMMERCIAL

## 2024-03-28 VITALS
DIASTOLIC BLOOD PRESSURE: 80 MMHG | SYSTOLIC BLOOD PRESSURE: 139 MMHG | BODY MASS INDEX: 29.43 KG/M2 | HEIGHT: 64 IN | RESPIRATION RATE: 97 BRPM | HEART RATE: 71 BPM | WEIGHT: 172.38 LBS

## 2024-03-28 DIAGNOSIS — E55.9 VITAMIN D DEFICIENCY: ICD-10-CM

## 2024-03-28 DIAGNOSIS — I10 ESSENTIAL HYPERTENSION: Chronic | ICD-10-CM

## 2024-03-28 DIAGNOSIS — R05.8 PRODUCTIVE COUGH: Primary | ICD-10-CM

## 2024-03-28 PROBLEM — M79.641 PAIN OF RIGHT HAND: Status: RESOLVED | Noted: 2024-02-21 | Resolved: 2024-03-28

## 2024-03-28 PROBLEM — M79.89 SWELLING OF RIGHT HAND: Status: RESOLVED | Noted: 2024-02-21 | Resolved: 2024-03-28

## 2024-03-28 PROCEDURE — 1160F RVW MEDS BY RX/DR IN RCRD: CPT | Mod: CPTII,S$GLB,, | Performed by: FAMILY MEDICINE

## 2024-03-28 PROCEDURE — 3008F BODY MASS INDEX DOCD: CPT | Mod: CPTII,S$GLB,, | Performed by: FAMILY MEDICINE

## 2024-03-28 PROCEDURE — 1159F MED LIST DOCD IN RCRD: CPT | Mod: CPTII,S$GLB,, | Performed by: FAMILY MEDICINE

## 2024-03-28 PROCEDURE — 99214 OFFICE O/P EST MOD 30 MIN: CPT | Mod: S$GLB,,, | Performed by: FAMILY MEDICINE

## 2024-03-28 PROCEDURE — 3075F SYST BP GE 130 - 139MM HG: CPT | Mod: CPTII,S$GLB,, | Performed by: FAMILY MEDICINE

## 2024-03-28 PROCEDURE — 99999 PR PBB SHADOW E&M-EST. PATIENT-LVL III: CPT | Mod: PBBFAC,,, | Performed by: FAMILY MEDICINE

## 2024-03-28 PROCEDURE — 3079F DIAST BP 80-89 MM HG: CPT | Mod: CPTII,S$GLB,, | Performed by: FAMILY MEDICINE

## 2024-03-28 RX ORDER — PROMETHAZINE HYDROCHLORIDE AND DEXTROMETHORPHAN HYDROBROMIDE 6.25; 15 MG/5ML; MG/5ML
5 SYRUP ORAL NIGHTLY PRN
Qty: 118 ML | Refills: 0 | Status: SHIPPED | OUTPATIENT
Start: 2024-03-28

## 2024-03-28 RX ORDER — LOSARTAN POTASSIUM AND HYDROCHLOROTHIAZIDE 25; 100 MG/1; MG/1
1 TABLET ORAL DAILY
Qty: 90 TABLET | Refills: 3 | Status: SHIPPED | OUTPATIENT
Start: 2024-03-28 | End: 2025-03-28

## 2024-03-28 RX ORDER — METHYLPREDNISOLONE 4 MG/1
TABLET ORAL
Qty: 1 EACH | Refills: 0 | Status: SHIPPED | OUTPATIENT
Start: 2024-03-28 | End: 2024-05-03

## 2024-03-28 NOTE — PROGRESS NOTES
"Subjective:       Patient ID: Dana Winter is a 53 y.o. female.    Chief Complaint: Cough    53-year-old  female patient with Patient Active Problem List:     Essential hypertension     DJD (degenerative joint disease) of cervical spine     Iron deficiency anemia due to chronic blood loss     Vitamin D deficiency     Overweight (BMI 25.0-29.9)     Hyperuricemia     History of thrombocytosis     Stiffness of finger joint of right hand  Here with productive cough for the past 1 week but denies any fever with chills, Has been having nasal congestion  Patient has tried taking over-the-counter cough medication with no relief  Denies of any wheezing but has been having raspy cough      Review of Systems   Constitutional:  Negative for chills, fatigue and fever.   HENT:  Positive for congestion and postnasal drip.    Eyes:  Negative for visual disturbance.   Respiratory:  Positive for cough. Negative for chest tightness, shortness of breath and wheezing.    Cardiovascular:  Negative for chest pain and leg swelling.   Gastrointestinal:  Negative for abdominal pain, nausea and vomiting.   Musculoskeletal:  Negative for myalgias.   Skin:  Negative for rash.   Neurological:  Negative for light-headedness and headaches.   Psychiatric/Behavioral:  Negative for sleep disturbance.          /80 (BP Location: Left arm, Patient Position: Sitting, BP Method: Large (Manual))   Pulse 71   Resp (!) 97   Ht 5' 4.02" (1.626 m)   Wt 78.2 kg (172 lb 6.4 oz)   BMI 29.58 kg/m²   Objective:      Physical Exam  Constitutional:       Appearance: She is well-developed.   HENT:      Head: Normocephalic and atraumatic.      Nose: Congestion present.      Mouth/Throat:      Mouth: Mucous membranes are moist.      Pharynx: No oropharyngeal exudate.      Comments: Noted postnasal drip  Cardiovascular:      Rate and Rhythm: Normal rate and regular rhythm.      Heart sounds: Normal heart sounds. No murmur " heard.  Pulmonary:      Effort: Pulmonary effort is normal. No respiratory distress.      Breath sounds: Normal breath sounds. No wheezing.   Chest:      Chest wall: No tenderness.   Abdominal:      General: Bowel sounds are normal.      Palpations: Abdomen is soft.      Tenderness: There is no abdominal tenderness.   Skin:     General: Skin is warm and dry.      Findings: No rash.   Neurological:      Mental Status: She is alert and oriented to person, place, and time.   Psychiatric:         Mood and Affect: Mood normal.           Assessment/Plan:   1. Productive cough  -     promethazine-dextromethorphan (PROMETHAZINE-DM) 6.25-15 mg/5 mL Syrp; Take 5 mLs by mouth nightly as needed (cough).  Dispense: 118 mL; Refill: 0  -     methylPREDNISolone (MEDROL DOSEPACK) 4 mg tablet; use as directed  Dispense: 1 each; Refill: 0  Medrol Dosepak and cough syrup has been sent to the pharmacy  Take it as prescribed and drink adequate fluids    2. Essential hypertension  -     losartan-hydrochlorothiazide 100-25 mg (HYZAAR) 100-25 mg per tablet; Take 1 tablet by mouth once daily.  Dispense: 90 tablet; Refill: 3  Blood pressure stable currently on losartan hydrochlorothiazide 100/25 mg, refill given today    3. Vitamin D deficiency  Encouraged to take vitamin-D supplements    Follow-up in 4 weeks for physicals

## 2024-04-05 ENCOUNTER — PATIENT MESSAGE (OUTPATIENT)
Dept: HEMATOLOGY/ONCOLOGY | Facility: CLINIC | Age: 54
End: 2024-04-05
Payer: COMMERCIAL

## 2024-04-05 ENCOUNTER — TELEPHONE (OUTPATIENT)
Dept: HEMATOLOGY/ONCOLOGY | Facility: CLINIC | Age: 54
End: 2024-04-05
Payer: COMMERCIAL

## 2024-04-05 NOTE — TELEPHONE ENCOUNTER
Nurse attempted to contact patient regarding virtual appt. Patient no-showed lab appointment and did not complete 24 hour urine container. Per Dr. Dunbar, reschedule appt. Unable to leave voicemail.

## 2024-05-03 ENCOUNTER — OFFICE VISIT (OUTPATIENT)
Dept: RHEUMATOLOGY | Facility: CLINIC | Age: 54
End: 2024-05-03
Payer: COMMERCIAL

## 2024-05-03 ENCOUNTER — LAB VISIT (OUTPATIENT)
Dept: LAB | Facility: HOSPITAL | Age: 54
End: 2024-05-03
Attending: STUDENT IN AN ORGANIZED HEALTH CARE EDUCATION/TRAINING PROGRAM
Payer: COMMERCIAL

## 2024-05-03 VITALS
WEIGHT: 170.88 LBS | DIASTOLIC BLOOD PRESSURE: 88 MMHG | SYSTOLIC BLOOD PRESSURE: 150 MMHG | HEIGHT: 64 IN | HEART RATE: 68 BPM | BODY MASS INDEX: 29.17 KG/M2

## 2024-05-03 DIAGNOSIS — M79.89 SWELLING OF RIGHT HAND: ICD-10-CM

## 2024-05-03 DIAGNOSIS — K21.9 GASTROESOPHAGEAL REFLUX DISEASE WITHOUT ESOPHAGITIS: ICD-10-CM

## 2024-05-03 DIAGNOSIS — E79.0 HYPERURICEMIA: ICD-10-CM

## 2024-05-03 DIAGNOSIS — M1A.4410 OTHER SECONDARY CHRONIC GOUT OF RIGHT HAND WITHOUT TOPHUS: Primary | ICD-10-CM

## 2024-05-03 LAB
ALBUMIN SERPL BCP-MCNC: 3.1 G/DL (ref 3.5–5.2)
ALP SERPL-CCNC: 93 U/L (ref 55–135)
ALT SERPL W/O P-5'-P-CCNC: 11 U/L (ref 10–44)
ANION GAP SERPL CALC-SCNC: 6 MMOL/L (ref 8–16)
AST SERPL-CCNC: 17 U/L (ref 10–40)
BASOPHILS # BLD AUTO: 0.05 K/UL (ref 0–0.2)
BASOPHILS NFR BLD: 1.1 % (ref 0–1.9)
BILIRUB SERPL-MCNC: 0.3 MG/DL (ref 0.1–1)
BUN SERPL-MCNC: 14 MG/DL (ref 6–20)
CALCIUM SERPL-MCNC: 9.5 MG/DL (ref 8.7–10.5)
CHLORIDE SERPL-SCNC: 108 MMOL/L (ref 95–110)
CO2 SERPL-SCNC: 25 MMOL/L (ref 23–29)
CREAT SERPL-MCNC: 0.9 MG/DL (ref 0.5–1.4)
CRP SERPL-MCNC: 2.3 MG/L (ref 0–8.2)
DIFFERENTIAL METHOD BLD: ABNORMAL
EOSINOPHIL # BLD AUTO: 0.1 K/UL (ref 0–0.5)
EOSINOPHIL NFR BLD: 2.3 % (ref 0–8)
ERYTHROCYTE [DISTWIDTH] IN BLOOD BY AUTOMATED COUNT: 13.7 % (ref 11.5–14.5)
ERYTHROCYTE [SEDIMENTATION RATE] IN BLOOD BY PHOTOMETRIC METHOD: 39 MM/HR (ref 0–36)
EST. GFR  (NO RACE VARIABLE): >60 ML/MIN/1.73 M^2
GLUCOSE SERPL-MCNC: 78 MG/DL (ref 70–110)
HCT VFR BLD AUTO: 37.1 % (ref 37–48.5)
HGB BLD-MCNC: 11.8 G/DL (ref 12–16)
IMM GRANULOCYTES # BLD AUTO: 0.01 K/UL (ref 0–0.04)
IMM GRANULOCYTES NFR BLD AUTO: 0.2 % (ref 0–0.5)
LYMPHOCYTES # BLD AUTO: 1.4 K/UL (ref 1–4.8)
LYMPHOCYTES NFR BLD: 32.4 % (ref 18–48)
MCH RBC QN AUTO: 26.4 PG (ref 27–31)
MCHC RBC AUTO-ENTMCNC: 31.8 G/DL (ref 32–36)
MCV RBC AUTO: 83 FL (ref 82–98)
MONOCYTES # BLD AUTO: 0.3 K/UL (ref 0.3–1)
MONOCYTES NFR BLD: 7.7 % (ref 4–15)
NEUTROPHILS # BLD AUTO: 2.5 K/UL (ref 1.8–7.7)
NEUTROPHILS NFR BLD: 56.3 % (ref 38–73)
NRBC BLD-RTO: 0 /100 WBC
PLATELET # BLD AUTO: 375 K/UL (ref 150–450)
PMV BLD AUTO: 9.9 FL (ref 9.2–12.9)
POTASSIUM SERPL-SCNC: 4.1 MMOL/L (ref 3.5–5.1)
PROT SERPL-MCNC: 7.1 G/DL (ref 6–8.4)
RBC # BLD AUTO: 4.47 M/UL (ref 4–5.4)
SODIUM SERPL-SCNC: 139 MMOL/L (ref 136–145)
URATE SERPL-MCNC: 4.6 MG/DL (ref 2.4–5.7)
WBC # BLD AUTO: 4.42 K/UL (ref 3.9–12.7)

## 2024-05-03 PROCEDURE — 3077F SYST BP >= 140 MM HG: CPT | Mod: CPTII,S$GLB,, | Performed by: STUDENT IN AN ORGANIZED HEALTH CARE EDUCATION/TRAINING PROGRAM

## 2024-05-03 PROCEDURE — 84550 ASSAY OF BLOOD/URIC ACID: CPT | Performed by: STUDENT IN AN ORGANIZED HEALTH CARE EDUCATION/TRAINING PROGRAM

## 2024-05-03 PROCEDURE — 80053 COMPREHEN METABOLIC PANEL: CPT | Performed by: STUDENT IN AN ORGANIZED HEALTH CARE EDUCATION/TRAINING PROGRAM

## 2024-05-03 PROCEDURE — 85025 COMPLETE CBC W/AUTO DIFF WBC: CPT | Performed by: STUDENT IN AN ORGANIZED HEALTH CARE EDUCATION/TRAINING PROGRAM

## 2024-05-03 PROCEDURE — 99215 OFFICE O/P EST HI 40 MIN: CPT | Mod: S$GLB,,, | Performed by: STUDENT IN AN ORGANIZED HEALTH CARE EDUCATION/TRAINING PROGRAM

## 2024-05-03 PROCEDURE — 86140 C-REACTIVE PROTEIN: CPT | Performed by: STUDENT IN AN ORGANIZED HEALTH CARE EDUCATION/TRAINING PROGRAM

## 2024-05-03 PROCEDURE — 99999 PR PBB SHADOW E&M-EST. PATIENT-LVL III: CPT | Mod: PBBFAC,,, | Performed by: STUDENT IN AN ORGANIZED HEALTH CARE EDUCATION/TRAINING PROGRAM

## 2024-05-03 PROCEDURE — 1160F RVW MEDS BY RX/DR IN RCRD: CPT | Mod: CPTII,S$GLB,, | Performed by: STUDENT IN AN ORGANIZED HEALTH CARE EDUCATION/TRAINING PROGRAM

## 2024-05-03 PROCEDURE — 85652 RBC SED RATE AUTOMATED: CPT | Performed by: STUDENT IN AN ORGANIZED HEALTH CARE EDUCATION/TRAINING PROGRAM

## 2024-05-03 PROCEDURE — 36415 COLL VENOUS BLD VENIPUNCTURE: CPT | Performed by: STUDENT IN AN ORGANIZED HEALTH CARE EDUCATION/TRAINING PROGRAM

## 2024-05-03 PROCEDURE — 3008F BODY MASS INDEX DOCD: CPT | Mod: CPTII,S$GLB,, | Performed by: STUDENT IN AN ORGANIZED HEALTH CARE EDUCATION/TRAINING PROGRAM

## 2024-05-03 PROCEDURE — 3079F DIAST BP 80-89 MM HG: CPT | Mod: CPTII,S$GLB,, | Performed by: STUDENT IN AN ORGANIZED HEALTH CARE EDUCATION/TRAINING PROGRAM

## 2024-05-03 PROCEDURE — 1159F MED LIST DOCD IN RCRD: CPT | Mod: CPTII,S$GLB,, | Performed by: STUDENT IN AN ORGANIZED HEALTH CARE EDUCATION/TRAINING PROGRAM

## 2024-05-03 RX ORDER — PANTOPRAZOLE SODIUM 40 MG/1
40 TABLET, DELAYED RELEASE ORAL DAILY
Qty: 90 TABLET | Refills: 3 | Status: SHIPPED | OUTPATIENT
Start: 2024-05-03 | End: 2025-05-03

## 2024-05-03 RX ORDER — PREDNISONE 5 MG/1
TABLET ORAL
Qty: 61 TABLET | Refills: 0 | Status: SHIPPED | OUTPATIENT
Start: 2024-05-03 | End: 2024-05-17

## 2024-05-03 NOTE — PATIENT INSTRUCTIONS
Prednisone 40 mg daily for 5 days, then 20 mg daily for 3 days, then 10 mg daily for 3 days, then 5 mg daily for 3 days, then stop.  Start Protonix daily for acid reflux  Let me know if you are not doing better after the prednisone course and I will order the MRI.

## 2024-05-03 NOTE — PROGRESS NOTES
Subjective:      Patient ID: Dana Winter is a 54 y.o. female.    Chief Complaint: Swelling (Right hand )    HPI:   Interval Hx:  Since last time I saw her, she went to Hand Surgery and her hand was looking much better already. There was nothing to aspirate or biopsy. I discussed with the Hand Surgeon, Dr. Garcia, and we decided to treat conservatively with occupational therapy and repeat an MRI if there was no improvement. Today she is here for follow up. The swelling has greatly improved. She has completed OT for the right hand and continues to do the exercises at home. She still has some tenosynovitis in the right 2nd and less so in the 4th digits which is preventing her from making a fist. Still with some pain in those digits as well. She has the naproxen and uses it sparingly for pain. She took a Medrol dosepack for a URI in March but didn't pay attention to whether that helped the right hand symptoms. She has been coughing at night when she lies down flat. Denies orthopnea.      Initial Hx:  Patient is a 52 yo F who presents for right hand pain and swelling. Patient is not a great historian so some history taken from the chart. Symptoms started just before 11/29/2023 when she was reaching in the washing machine and felt right back muscle strain. 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 A few days later, she noticed right dorsal hand swelling extending to the right fingers. Associated warmth, erythema, significant pain. She showed me a photo on her phone dated 12/4/2023:      She presented to Internal Medicine on 12/4/23 and was given a toradol IM injection and prednisone taper (40 mg for 2 days and taper by 10 mg every 2 days) for presumed gout. At that time uric acid was 11 and CRP was 424. She reports not much improvement with the prednisone taper though  her CRP improved to 234 by 12/13/2023. Then on 12/15/2023, she saw her PCP and got Voltaren gel and lab work was done showing worsened anemia (chronic), platelets >1000 (clotted?), procalcitonin 1.34, Cr 1.7 with potassium 5.9 (also high on 12/13/23 and last checked in 1/2023 when it was normal). She went to get x-ray of her hand on 12/15/23 and was short of breath in radiology department so a chest x-ray was done and showed some concern for pneumonia. Therefore she was given Augmentin and doxycycline. She reports improvement in the right hand symptoms since then.    During all this, she hasn't taken much for the pain. Naproxen for pain once nightly for some time. Used ibuprofen once or twice.    She endorses unintentional weight loss of 22 lbs in 5 months.    She has never been diagnosed with gout and no family history of gout. No previous joint swelling and pain attacks.  History of kidney stones: no  Family history of gout: no  Previous gout medications: no  Alcohol use: no  High fructose corn syrup use: yes, lemonade, sweet tea, slushies  Low purine diet: no    No skin rashes, malar rash, photosensitivity   No telangiectasias   No calcinosis   No psoriasis   No patchy alopecia   No oral or nasal ulcers   No dry eyes or dry mouth   No pleurisy, chest pain, dyspnea, cough  No dysphagia, diplopia, dysphonia  No muscle weakness   No Raynaud's  No digital ulcers   No cytopenias   No renal issues   No blood clots   No fever, chills, night sweats, or loss of appetite. +weight loss  No new onset headaches   No recurrent conjunctivitis, uveitis, scleritis, or episcleritis   No chronic or bloody diarrhea. No Ulcerative Colitis or Chron's (IBD)  No vaginal or penile and urethral discharge/STDs/ulcers   No unexplained lymphadenopathy  No parotitis   No seizures, strokes, psychosis  No sclerodactyly  No perioral tightness     Social Hx: never smoker, no drug use, no alcohol  Family Hx: No family history of autoimmune  "disease    Objective:   BP (!) 150/88   Pulse 68   Ht 5' 4" (1.626 m)   Wt 77.5 kg (170 lb 13.7 oz)   BMI 29.33 kg/m²   Physical Exam  Constitutional:       General: She is not in acute distress.     Appearance: Normal appearance.   HENT:      Head: Normocephalic and atraumatic.      Mouth/Throat:      Mouth: Mucous membranes are moist.      Pharynx: Oropharynx is clear.   Cardiovascular:      Rate and Rhythm: Normal rate and regular rhythm.   Pulmonary:      Effort: Pulmonary effort is normal.      Breath sounds: Normal breath sounds.   Abdominal:      Palpations: Abdomen is soft.      Tenderness: There is no abdominal tenderness.   Musculoskeletal:      Cervical back: Normal range of motion. No tenderness.      Comments: Extensor tenosynovitis in right 2nd finger and less so in right 4th finger. Can make 60% fist due to the swelling in these 2 fingers. Overall much improved since last visit.   Skin:     General: Skin is warm and dry.   Neurological:      Mental Status: She is alert and oriented to person, place, and time. Mental status is at baseline.           Assessment:     1. 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 Hyperuricemia    3. Swelling of right hand    4. 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 Symptoms started end of " November 2023, a few days after hurting her right hand by dropping a heavy box of mail at work.    MRI hand w/wo contrast 12/21/2023:  1.    Negative for evidence of os myelitis.  Scattered degenerative changes noted.  2.    Exuberant joint effusion fourth MP joint, nonspecific.  Inflammatory arthropathy possible.  Septic arthritis thought less likely.  3.    Extensor and flexor tenosynovitis with exuberant tenosynovitis of the flexor tendons of the index and ring fingers.  Additional exuberant soft tissue edema throughout the index and ring fingers.  No focal fluid collections to suggest abscess.    Not much improvement with prednisone in 12/2023. She also had PNA in 12/2023 and took Augmentin and doxycycline for 7 days and the right hand symptoms improved significantly. At this point she has residual tenosynovitis in the right 2nd-4th fingers and no other joint involvement. Uric acid was 11. ESR and CRP were significantly elevated in 12/2023 and now improving. Negative RF, CCP, MAURI.    Maybe the right hand tenosynovitis was all due to a chronic gout attack precipitated by trauma. Overall symptoms have improved a lot with naproxen, OT, and time. Will give a steroid course for the remaining inflammation. Advised her to let me know if the steroids don't resolve the issue and we will get a repeat MRI. Suspect her coughing when lying flat at night is due to GERD since she's been taking NSAIDs. Will give Protonix.      Plan:  Pending labs today  Prednisone 40 mg daily for 5 days, then 20 mg daily for 3 days, then 10 mg daily for 3 days, then 5 mg daily for 3 days, then stop.  Start Protonix daily for acid reflux    Follow up in about 2 months (around 7/3/2024).

## 2024-05-23 ENCOUNTER — HOSPITAL ENCOUNTER (OUTPATIENT)
Dept: RADIOLOGY | Facility: HOSPITAL | Age: 54
Discharge: HOME | End: 2024-05-23
Attending: FAMILY MEDICINE
Payer: COMMERCIAL

## 2024-05-23 VITALS — WEIGHT: 170.88 LBS | BODY MASS INDEX: 29.17 KG/M2 | HEIGHT: 64 IN

## 2024-05-23 DIAGNOSIS — Z12.31 OTHER SCREENING MAMMOGRAM: ICD-10-CM

## 2024-05-23 PROCEDURE — 77067 SCR MAMMO BI INCL CAD: CPT | Mod: 26,,, | Performed by: RADIOLOGY

## 2024-05-23 PROCEDURE — 77063 BREAST TOMOSYNTHESIS BI: CPT | Mod: 26,,, | Performed by: RADIOLOGY

## 2024-05-23 PROCEDURE — 77063 BREAST TOMOSYNTHESIS BI: CPT | Mod: TC

## 2024-08-09 ENCOUNTER — LAB VISIT (OUTPATIENT)
Dept: LAB | Facility: HOSPITAL | Age: 54
End: 2024-08-09
Attending: STUDENT IN AN ORGANIZED HEALTH CARE EDUCATION/TRAINING PROGRAM
Payer: COMMERCIAL

## 2024-08-09 ENCOUNTER — OFFICE VISIT (OUTPATIENT)
Dept: RHEUMATOLOGY | Facility: CLINIC | Age: 54
End: 2024-08-09
Payer: COMMERCIAL

## 2024-08-09 VITALS
DIASTOLIC BLOOD PRESSURE: 92 MMHG | HEART RATE: 77 BPM | WEIGHT: 176.81 LBS | BODY MASS INDEX: 30.35 KG/M2 | SYSTOLIC BLOOD PRESSURE: 149 MMHG

## 2024-08-09 DIAGNOSIS — M1A.4410 OTHER SECONDARY CHRONIC GOUT OF RIGHT HAND WITHOUT TOPHUS: ICD-10-CM

## 2024-08-09 DIAGNOSIS — E79.0 HYPERURICEMIA: ICD-10-CM

## 2024-08-09 DIAGNOSIS — M79.89 SWELLING OF RIGHT HAND: ICD-10-CM

## 2024-08-09 DIAGNOSIS — M79.89 SWELLING OF RIGHT HAND: Primary | ICD-10-CM

## 2024-08-09 DIAGNOSIS — M25.432 PAIN AND SWELLING OF LEFT WRIST: ICD-10-CM

## 2024-08-09 DIAGNOSIS — M25.532 PAIN AND SWELLING OF LEFT WRIST: ICD-10-CM

## 2024-08-09 LAB
ALBUMIN SERPL BCP-MCNC: 3.4 G/DL (ref 3.5–5.2)
ALP SERPL-CCNC: 87 U/L (ref 55–135)
ALT SERPL W/O P-5'-P-CCNC: 12 U/L (ref 10–44)
ANION GAP SERPL CALC-SCNC: 8 MMOL/L (ref 8–16)
AST SERPL-CCNC: 17 U/L (ref 10–40)
BASOPHILS # BLD AUTO: 0.05 K/UL (ref 0–0.2)
BASOPHILS NFR BLD: 0.9 % (ref 0–1.9)
BILIRUB SERPL-MCNC: 0.3 MG/DL (ref 0.1–1)
BUN SERPL-MCNC: 14 MG/DL (ref 6–20)
CALCIUM SERPL-MCNC: 9.1 MG/DL (ref 8.7–10.5)
CHLORIDE SERPL-SCNC: 107 MMOL/L (ref 95–110)
CO2 SERPL-SCNC: 23 MMOL/L (ref 23–29)
CREAT SERPL-MCNC: 1 MG/DL (ref 0.5–1.4)
CRP SERPL-MCNC: 3.1 MG/L (ref 0–8.2)
DIFFERENTIAL METHOD BLD: ABNORMAL
EOSINOPHIL # BLD AUTO: 0.1 K/UL (ref 0–0.5)
EOSINOPHIL NFR BLD: 1.6 % (ref 0–8)
ERYTHROCYTE [DISTWIDTH] IN BLOOD BY AUTOMATED COUNT: 14.2 % (ref 11.5–14.5)
ERYTHROCYTE [SEDIMENTATION RATE] IN BLOOD BY PHOTOMETRIC METHOD: 77 MM/HR (ref 0–36)
EST. GFR  (NO RACE VARIABLE): >60 ML/MIN/1.73 M^2
GLUCOSE SERPL-MCNC: 84 MG/DL (ref 70–110)
HCT VFR BLD AUTO: 36.7 % (ref 37–48.5)
HGB BLD-MCNC: 11.5 G/DL (ref 12–16)
IMM GRANULOCYTES # BLD AUTO: 0.02 K/UL (ref 0–0.04)
IMM GRANULOCYTES NFR BLD AUTO: 0.4 % (ref 0–0.5)
LYMPHOCYTES # BLD AUTO: 1.5 K/UL (ref 1–4.8)
LYMPHOCYTES NFR BLD: 26.2 % (ref 18–48)
MCH RBC QN AUTO: 26.7 PG (ref 27–31)
MCHC RBC AUTO-ENTMCNC: 31.3 G/DL (ref 32–36)
MCV RBC AUTO: 85 FL (ref 82–98)
MONOCYTES # BLD AUTO: 0.5 K/UL (ref 0.3–1)
MONOCYTES NFR BLD: 9.7 % (ref 4–15)
NEUTROPHILS # BLD AUTO: 3.4 K/UL (ref 1.8–7.7)
NEUTROPHILS NFR BLD: 61.2 % (ref 38–73)
NRBC BLD-RTO: 0 /100 WBC
PLATELET # BLD AUTO: 401 K/UL (ref 150–450)
PMV BLD AUTO: 9.9 FL (ref 9.2–12.9)
POTASSIUM SERPL-SCNC: 4.6 MMOL/L (ref 3.5–5.1)
PROT SERPL-MCNC: 6.9 G/DL (ref 6–8.4)
RBC # BLD AUTO: 4.31 M/UL (ref 4–5.4)
SODIUM SERPL-SCNC: 138 MMOL/L (ref 136–145)
URATE SERPL-MCNC: 4.9 MG/DL (ref 2.4–5.7)
WBC # BLD AUTO: 5.54 K/UL (ref 3.9–12.7)

## 2024-08-09 PROCEDURE — 36415 COLL VENOUS BLD VENIPUNCTURE: CPT | Performed by: STUDENT IN AN ORGANIZED HEALTH CARE EDUCATION/TRAINING PROGRAM

## 2024-08-09 PROCEDURE — 86140 C-REACTIVE PROTEIN: CPT | Performed by: STUDENT IN AN ORGANIZED HEALTH CARE EDUCATION/TRAINING PROGRAM

## 2024-08-09 PROCEDURE — 85025 COMPLETE CBC W/AUTO DIFF WBC: CPT | Performed by: STUDENT IN AN ORGANIZED HEALTH CARE EDUCATION/TRAINING PROGRAM

## 2024-08-09 PROCEDURE — 80053 COMPREHEN METABOLIC PANEL: CPT | Performed by: STUDENT IN AN ORGANIZED HEALTH CARE EDUCATION/TRAINING PROGRAM

## 2024-08-09 PROCEDURE — 99999 PR PBB SHADOW E&M-EST. PATIENT-LVL IV: CPT | Mod: PBBFAC,,, | Performed by: STUDENT IN AN ORGANIZED HEALTH CARE EDUCATION/TRAINING PROGRAM

## 2024-08-09 PROCEDURE — 84550 ASSAY OF BLOOD/URIC ACID: CPT | Performed by: STUDENT IN AN ORGANIZED HEALTH CARE EDUCATION/TRAINING PROGRAM

## 2024-08-09 PROCEDURE — 85652 RBC SED RATE AUTOMATED: CPT | Performed by: STUDENT IN AN ORGANIZED HEALTH CARE EDUCATION/TRAINING PROGRAM

## 2024-08-09 RX ORDER — PREDNISONE 10 MG/1
TABLET ORAL
Qty: 35 TABLET | Refills: 0 | Status: SHIPPED | OUTPATIENT
Start: 2024-08-09 | End: 2024-08-24

## 2024-08-12 ENCOUNTER — CLINICAL SUPPORT (OUTPATIENT)
Dept: REHABILITATION | Facility: HOSPITAL | Age: 54
End: 2024-08-12
Attending: STUDENT IN AN ORGANIZED HEALTH CARE EDUCATION/TRAINING PROGRAM
Payer: COMMERCIAL

## 2024-08-12 DIAGNOSIS — M25.432 PAIN AND SWELLING OF LEFT WRIST: ICD-10-CM

## 2024-08-12 DIAGNOSIS — R29.898 HAND WEAKNESS: ICD-10-CM

## 2024-08-12 DIAGNOSIS — M25.532 PAIN AND SWELLING OF LEFT WRIST: ICD-10-CM

## 2024-08-12 DIAGNOSIS — M25.532 LEFT WRIST PAIN: Primary | ICD-10-CM

## 2024-08-12 DIAGNOSIS — M79.89 SWELLING OF RIGHT HAND: ICD-10-CM

## 2024-08-12 PROBLEM — M79.644 PAIN OF FINGER OF RIGHT HAND: Status: ACTIVE | Noted: 2024-02-21

## 2024-08-12 PROCEDURE — 97166 OT EVAL MOD COMPLEX 45 MIN: CPT

## 2024-08-12 PROCEDURE — 97110 THERAPEUTIC EXERCISES: CPT

## 2024-08-12 PROCEDURE — 97140 MANUAL THERAPY 1/> REGIONS: CPT

## 2024-08-12 NOTE — PLAN OF CARE
[facility]  Occupational Therapy Initial Evaluation      Name: Dana Winter  Clinic Number: [id number]    Therapy Diagnosis:   Encounter Diagnoses   Name Primary?    Swelling of right hand     Pain and swelling of left wrist     Left wrist pain Yes    Hand weakness      Physician: Kash Valera MD    Physician Orders: Eval and Treat  Medical Diagnosis: M79.89 (ICD-10-CM) - Swelling of right hand M25.532,M25.432 (ICD-10-CM) - Pain and swelling of left wrist  Surgical Procedure and Date: N/A  Evaluation Date: 8/12/2024  Insurance Authorization Period Expiration: 12/31/2024  Plan of Care Certification Period: 08/12/24 to 10/07/24  Progress Note due: 09/11/24  Date of Return to MD: 09/20/24    Visit # / Visits authorized: 1 / pending  FOTO: 1/ 3  Initial=TBA% / Goal=TBA%    Precautions:  Standard and blood thinners    Time In: 2:00  Time Out: 3:00  Total Billable Time: 60 minutes    Subjective      Date of Onset: approximately 1 week ago for the L wrist; recent flare up of R IF about a couple months ago    History of Current Condition/Mechanism of Injury: Dana reports symptoms gradually and progressively worsened. She is currently on oral cortisone for inflammation.      Falls: [] Yes [x] No Comments:    Involved Side: [] Right [] Left [x] Bilateral  Dominant Side: Right    Mechanism of Injury: [] Acute Trauma []  Surgical  [x] Cumulative/Repetitive Strain Injury [] Congenital   [x] Degenerative Condition   [] ______  Imaging:  None recently    Prior Therapy: [] Yes [x] No Comments:    Pain:  Functional Pain Scale Rating 0-10:   0/10 on average  0/10 at best  7/10 at worst  Location: L wrist and R IF  Description: Throbbing, Sharp, and pressure  Aggravating Factors: Night Time and any impact to the finger  Easing Factors: massage and rest    Occupation:  works in the  for the [employer]  Working presently: employed  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     Current Functional Status   Home/Living environment: lives with their spouse    - [x] One  [] Two story home, 0 steps to enter    - DME: [x] None [] Wheelchair [] Walker [] Bedside Commode      []Shower/Tub Chair [] Cane [] Adaptive Equipment      Limitation of Functional Status as follows:   ADLs/IADLs:     - Feeding: []No []Minimal [x]Moderate[]Significant difficulty/pain cutting foods    - Bathing: [x]No []Minimal []Moderate[]Significant difficulty     - Dressing/Grooming: []No [x]Minimal []Moderate[]Significant difficulty with fasteners/laces/accessories    - Driving: mostly using B hand; has []No [x]Minimal []Moderate[]Significant difficulty/discomfort while using B hand for short distances    Patient's Goals for Therapy: "be able to bend my finger better and have less pain"    Past Medical History/Physical Systems Review:   Dana Winter  has a past medical history of DJD (degenerative joint disease) of cervical spine and Hypertension.    Dana Winter  has a past surgical history that includes Tubal ligation; Laparoscopic appendectomy; Appendectomy; and Colonoscopy (N/A, 7/23/2021).    Dana has a current medication list which includes the following prescription(s): acetaminophen, diclofenac sodium, ergocalciferol, iron-vitamin c 100-250 mg (icar-c), losartan-hydrochlorothiazide 100-25 mg, pantoprazole, prednisone, and promethazine-dextromethorphan.    Review of patient's allergies indicates:  No Known Allergies     Objective      Observation/Appearance:  [x] Skin intact []Sutures intact [] Surgical incision Healing [] No Signs of Infection [x] Hypertrophic scarring [] Hypersensitivity to touch [x]Edema present moderate in R IF and minimal in L wrist  [x] Deformities " "noted: R IF PIP Extension contracture  [x] Myofascial tightness noted: mild hand intrinsic tightness    Edema: (Measured in centimeters)   8/12/2024 8/12/2024.    Left Right   Wrist Crease 15.3 15.5     Edema: (Measured in centimeters)   8/12/2024 8/12/2024    Left Right   Index:       P1 6.0 6.8    PIP 6.2 6.7   P2 5.5 5.6    DIP 5.2 5.3     Physical Performance Assessment: (ROM measured in degrees)   Right  Left       AROM  MMT  AROM  MMT  Pain/Dysfunction with Movement   Wrist        Extension WNL 5/5 73 4/5 [] Pain [] Abnormal Movement   Flexion V 5/5 70 4/5 [x] Pain [] Abnormal Movement   Radial Deviation WNL 5/5 20 4/5 [] Pain [] Abnormal Movement   Ulnar Deviation WNL 5/5 42 4/5 [] Pain [] Abnormal Movement     Range Of Motion: (Measured in degrees)  Date 8/12/2024    Right   Index: MP  0/95              PIP     -7/35              DIP 0/32               BXA=687   Long:  MP 0/85              PIP 0/90              DIP 0/40               RCI=346   Ring:   MP 0/80              PIP 0/90              DIP 0/35               AWR=033   Small:  MP 0/81               PIP 0/85               DIP 0/45               WDH=215   Thumb: MP WNL                IP "       Rad ADD/ABD "       Pal ADD/ABD "          Opposition To SF MCP      Sensation:  Patient denies numbness & tingling in B wrist/hands at this time. Light touch, temperature, sharp and dull are grossly intact in B wrist/hand       Strength (Dyanmometer) and Pinch Strength (Pinch Gauge)  Measured in pounds and psi.    8/12/2024 8/12/2024    Left Right   Rung II 65, 56, 60 35, 38, 30   Cristina Pinch 13 * 12   3pt Pinch 14 7   2pt Pinch 12 5.5        Intake Outcome Measure for FOTO Hand Survey    Therapist reviewed FOTO scores for Dana Winter on 8/12/2024.   FOTO report - see Media section or FOTO account episode details.    Intake Score: TBD%       Treatment      Total Treatment time (time-based codes) separate from Evaluation: 30 minutes    Dana received the " treatments listed below:      supervised modalities after being cleared for contradictions: Paraffin to R hand wrist/hand for 5 min, pre-tx to decrease pain & increase tissue extensibility     manual therapy techniques: Manual Lymphatic Drainage and Soft tissue Mobilization were applied to the: R hand/fingers for 15 minutes, including:  Use of hand techniques, cupping and IASTM tools to increase blood flow/circulation, improve soft tissue pliability and decrease pain.     therapeutic exercises to develop ROM for 15 minutes including:  Performed Initial Home Exercise Program    Patient Education and Home Exercises      Education provided:   - Role of OT, goals for OT, scheduling/cancellations, insurance limitations with patient.  - Established initial Home Exercise Program, see Patient Instructions for details  - Discussed activity restrictions/limitations, basic joint protection principles and repetitive strain injury prevention in ADL/IADL's    Written Home Exercises Provided: yes.  Exercises were reviewed and Dana was able to demonstrate them prior to the end of the session.    Dana demonstrated good  understanding of the education provided.     Pt was advised to perform these exercises free of pain, and to stop performing them if pain occurs.    See EMR under Patient Instructions for exercises provided 8/12/2024.    Assessment      Dana Winter is a 54 y.o. female referred to outpatient occupational therapy and presents with a medical diagnosis of M79.89 (ICD-10-CM) - Swelling of right hand M25.532,M25.432 (ICD-10-CM) - Pain and swelling of left wrist.  Following medical record review it is determined that pt will benefit from occupational therapy services in order to maximize pain free and/or functional use of bilateral hand. The following goals were discussed with the patient and patient is in agreement with them as to be addressed in the treatment plan. The patient's rehab potential is Good for goals  set.     Anticipated barriers to occupational therapy: comorbid diagnosis listed above, compliance with HEP and attendance to therapy as recommended     Plan of care discussed with patient: Yes  Patient's spiritual, cultural and educational needs considered and patient is agreeable to the plan of care and goals as stated below:     Medical Necessity is demonstrated by the following  Occupational Profile/History  Co-morbidities and personal factors that may impact the plan of care [] LOW: Brief chart review  [x] MODERATE: Expanded chart review   [] HIGH: Extensive chart review    Moderate / High Support Documentation: multiple diagnostic reports and physician's notes reviewed     Examination  Performance deficits relating to physical, cognitive or psychosocial skills that result in activity limitations and/or participation restrictions  [] LOW: addressing 1-3 Performance deficits  [] MODERATE: 3-5 Performance deficits  [x] HIGH: 5+ Performance deficits (please support below)    Moderate / High Support Documentation:    Physical:  Joint Mobility  Muscle Power/Strength  Skin Integrity/Scar Formation  Edema   Strength  Pinch Strength  Fine Motor Coordination  Pain    Cognitive:  No Deficits    Psychosocial:    No Deficits     Treatment Options [] LOW: Limited options  [x] MODERATE: Several options  [] HIGH: Multiple options      Decision Making/ Complexity Score: moderate       The following goals were discussed with the patient and patient is in agreement with them as to be addressed in the treatment plan.     Goals:    Short Term Goals (to be met in 3 weeks) Goal Status:   1) Establish HEP with patient independent in performing accurately [] Goal Met  [] Part Met   2) Patient will report Pain of 2-3 levels lower than initial measures on the pain scale for worst pain in affected hand  [] Goal Met  [] Part Met   3) Patient will increase AROM in SIDDIQUI's of affected fingers, by at least 5-8 degrees for improved  performance with ADL's [] Goal Met  [] Part Met   4) Assess or Increase  Strength of affected hand by 3-5 psi to improve functioning in ADL/IADL's [] Goal Met  [] Part Met   5) Edema in affected wrist/hand is at least .2-.3 cm less than initial measures at evaluation [] Goal Met  [] Part Met   6) Patient will be able to achieve less than or equal to a 10-15% improvement on FOTO survey demonstrating overall improved functional ability with upper extremity.  [] Goal Met  [] Part Met       Long Term Goals (to be met by Discharge) Goal Status:   1) Pain in affected hand will be no greater than 1-2/10 while performing ADL's/IADL's [] Goal Met  [] Part Met   2) Patient will demonstrate WFL AROM in SIDDIQUI's of affected hand/fingers, in all functional planes of mobility, for improved functioning in ADL/IADL's  [] Goal Met  [] Part Met   3) Increase /pinch Strength in affected hand by at least 15-20% of initial measures for improved performance in ADL/IADL's [] Goal Met  [] Part Met   4) Edema in affected hand/finger is trace to none [] Goal Met  [] Part Met   5) Patient will be able to achieve less than or equal to TBD% on Hand FOTO survey demonstrating overall improved functional ability with upper extremity.  [] Goal Met  [] Part Met        Plan     Plan of Care Certification: 08/12/24 to 10/07/24.     Outpatient Occupational Therapy 2-3 times weekly for 8 weeks to include the following interventions: Paraffin, Fluidotherapy, Manual therapy/joint mobilizations, Modalities for pain management, US 3 mhz, Therapeutic exercises/activities., Strengthening, Orthotic Fabrication/Fit/Training, Edema Control, Scar Management, Electrical Modalities, and Joint Protection.    Ravi Brooks OT    Physician's Signature: _________________________________________ Date: ________________

## 2024-08-12 NOTE — PATIENT INSTRUCTIONS
"[facility] - OCCUPATIONAL THERAPY  HOME EXERCISE PROGRAM     Complete the following massages for 3-5 minutes each, 2-3x/day.                       Edema massage  Scar massage    Complete the following exercises with 10 repetitions each, 4-6x/day.     AROM: DIP Flexion / Extension  Pinch middle knuckle to prevent bending. Bend end knuckle until stretch is felt.   Hold 3 seconds. Relax. Straighten finger as far as possible.    AROM: PIP Flexion / Extension  Pinch bottom knuckle  to prevent bending. Actively bend middle knuckle until stretch is felt.   Hold 3 seconds. Relax. Straighten finger as far as possible.      AROM: Isolated MCP Flexion / Extension ("Wave")   Bend only your large, bottom knuckles. Hold 3 seconds. Keep the tips of your fingers straight. Straighten fingers.    AROM: Isolated IPJ Flexion / Extension ("Hook")  Bend only your middle and end knuckles. Hold 3 seconds.   Straighten your fingers.     AROM: MCP and PIP Flexion / Extension ("Straight Fist")  Bend your bottom and middle knuckles, keeping the tips of your fingers straight. Try to touch the pads of your fingers on your palm. Hold 3 seconds. Straighten your fingers.     AROM: Composite Flexion / Extension ("Full Fist")  Bend every joint in your hand into a fist. Hold 3 seconds. Straighten your fingers.     Copyright © [employer]. All rights reserved.     Therapist: Ravi Brooks OT     "

## 2024-08-14 ENCOUNTER — CLINICAL SUPPORT (OUTPATIENT)
Dept: REHABILITATION | Facility: HOSPITAL | Age: 54
End: 2024-08-14
Payer: COMMERCIAL

## 2024-08-14 DIAGNOSIS — M25.532 LEFT WRIST PAIN: Primary | ICD-10-CM

## 2024-08-14 DIAGNOSIS — R29.898 HAND WEAKNESS: ICD-10-CM

## 2024-08-14 PROCEDURE — 97035 APP MDLTY 1+ULTRASOUND EA 15: CPT

## 2024-08-14 PROCEDURE — 97112 NEUROMUSCULAR REEDUCATION: CPT

## 2024-08-14 PROCEDURE — 97110 THERAPEUTIC EXERCISES: CPT

## 2024-08-14 PROCEDURE — 97140 MANUAL THERAPY 1/> REGIONS: CPT

## 2024-08-14 PROCEDURE — 97018 PARAFFIN BATH THERAPY: CPT

## 2024-08-14 NOTE — PROGRESS NOTES
[facility]  Occupational Therapy Treatment Note     Date: 8/14/2024  Name: Dana Winter  Clinic Number: [id number]    Therapy Diagnosis:   Encounter Diagnoses   Name Primary?    Left wrist pain Yes    Hand weakness      Physician: Kash Valera MD    Physician Orders: Eval and Treat  Medical Diagnosis: M79.89 (ICD-10-CM) - Swelling of right hand M25.532,M25.432 (ICD-10-CM) - Pain and swelling of left wrist  Surgical Procedure and Date: N/A  Evaluation Date: 8/12/2024  Insurance Authorization Period Expiration: 12/31/2024  Plan of Care Certification Period: 08/12/24 to 10/07/24  Progress Note due: 09/11/24  Date of Return to MD: 09/20/24     Visit # / Visits authorized: 1 / 12  FOTO: 1/ 3  Initial=TBA% / Goal=TBA%    Precautions:  Standard and blood thinners     Time In: 2:00  Time Out: 3:00  Total Billable Time: 60 minutes  (billing reflects skilled 1:1 time)      Subjective     Patient reports: no problems & good compliance w/ initial HEP   She was compliant with home exercise program given last session.   Response to previous treatment:No adverse reactions noted or reported   Functional change: None reported at first treatment after initial evaluation     Pain: 3-4/10  Location: right IF; L wrist/hand     Objective     Objective Measures updated at progress report unless specified.    Treatment     Dana received the treatments listed below:      Modalities performed as follows: Ultrasound performed on the R IF according to parameters below, while L wrist/hand received paraffin    Direct contact modalities after being cleared for contraindications: 3.3 MHz, 1.0 W/cm2, continuous, 10 min to volar aspect of R IF, MCP-PIP, to decrease pain & edema, increase circulation and tissue extensibility    Supervised modalities after being cleared for contraindications:   - Paraffin w/ MHP to L hand, pre-tx to decrease pain & increase tissue extensibility, performed during Ultrasound to R  IF    Manual therapy techniques: Myofacial release, Manual Lymphatic Drainage, Soft tissue Mobilization, and scar management were applied to the: R hand/IF & L wrist/hand for 25 minutes, including:     - use of hand techniques, cupping and IASTM tools to increase blood flow/circulation, improve soft tissue pliability and decrease pain.  Focus was placed on the R IF trigger finger and sclerotic tissue.   - Kinesiotaping: single spiral wrap applied to R IF for pain, edema and soft tissue management. Patient instructed on purpose, wear, care, precautions to monitor and removal of KT. Patient verbalized understanding of all instructions provided.      Therapeutic exercises to develop strength, endurance, and ROM for 13 minutes, including:  AROM     -Wrist E/F, RD/UD 5 reps each, L wrist   -TGE's (wave, hook, fist) 5 reps each, R hand   -Finger Extension lifts-IF-RF 5 reps each, R hand   -Isolated IF PIP & DIP 10 reps each, R hand   -Finger Abd/Add 10 reps, R hand       Digi-flex 2/10 reps, yellow, B'ly   PHG 2 10 reps, setting 2 (22#), B'ly       Add Next Visit:    T-putty yellow   -Grasp/Manipulation 3 reps, 5 sec hold    -log rolls w/ tripod & lateral pinch 1 log each     Neuromuscular re-education activities to improve Coordination and Proprioception for 12 minutes. The following activities were included:  Activities: B hands   Dexterciser 3 min   Isospheres  2 min       Add Next Visit:      T-putty Yellow/green mix   -molding 2 min      Patient Education and Home Exercises     Education provided:   - Reviewed HEP  - Progress towards goals     Written Home Exercises Provided: Patient instructed to cont prior HEP.  Exercises were reviewed and Dana was able to demonstrate them prior to the end of the session.  Dana demonstrated good  understanding of the home exercise program provided. See electronic medical record under Patient Instructions for exercises provided during therapy sessions.       Assessment     Pain in  R IF and L wrist/hand is minimal to moderate today.  Edema in R IF is moderate to significant, but decreased some by the end of OT treatment today.  Patient also had a slight increase in AROM of R IF by the end of OT treatment today.  Patient was able to perform all above listed therapeutic exercises without increased pain or difficulty today.     Dana is progressing well towards her goals and there are no updates to goals at this time. Pt prognosis is Good.     Patient will continue to benefit from skilled outpatient occupational therapy to address the deficits listed in the problem list on initial evaluation provide patient/family education and to maximize patient's level of independence in the home and community environment.     Patient's spiritual, cultural and educational needs considered and patient agreeable to plan of care and goals.    Anticipated barriers to occupational therapy: comorbid diagnosis, compliance with HEP and attendance to therapy as recommended     Goals:  Short Term Goals (to be met in 3 weeks) Goal Status:   1) Establish HEP with patient independent in performing accurately [] Goal Met  [] Part Met   2) Patient will report Pain of 2-3 levels lower than initial measures on the pain scale for worst pain in affected hand  [] Goal Met  [] Part Met   3) Patient will increase AROM in SIDDIQUI's of affected fingers, by at least 5-8 degrees for improved performance with ADL's [] Goal Met  [] Part Met   4) Assess or Increase  Strength of affected hand by 3-5 psi to improve functioning in ADL/IADL's [] Goal Met  [] Part Met   5) Edema in affected wrist/hand is at least .2-.3 cm less than initial measures at evaluation [] Goal Met  [] Part Met   6) Patient will be able to achieve less than or equal to a 10-15% improvement on FOTO survey demonstrating overall improved functional ability with upper extremity.  [] Goal Met  [] Part Met         Long Term Goals (to be met by Discharge) Goal Status:   1)  Pain in affected hand will be no greater than 1-2/10 while performing ADL's/IADL's [] Goal Met  [] Part Met   2) Patient will demonstrate WFL AROM in SIDDIQUI's of affected hand/fingers, in all functional planes of mobility, for improved functioning in ADL/IADL's  [] Goal Met  [] Part Met   3) Increase /pinch Strength in affected hand by at least 15-20% of initial measures for improved performance in ADL/IADL's [] Goal Met  [] Part Met   4) Edema in affected hand/finger is trace to none [] Goal Met  [] Part Met   5) Patient will be able to achieve less than or equal to TBD% on Hand FOTO survey demonstrating overall improved functional ability with upper extremity.  [] Goal Met  [] Part Met         Plan     Plan of Care Certification: 08/12/24 to 10/07/24.      Outpatient Occupational Therapy 2-3 times weekly for 8 weeks to include the following interventions: Paraffin, Fluidotherapy, Manual therapy/joint mobilizations, Modalities for pain management, US 3 mhz, Therapeutic exercises/activities., Strengthening, Orthotic Fabrication/Fit/Training, Edema Control, Scar Management, Electrical Modalities, and Joint Protection.  Updates/Grading for next session: progress ROM, as tolerated    Ravi Brooks OT   8/14/2024

## 2024-08-19 ENCOUNTER — CLINICAL SUPPORT (OUTPATIENT)
Dept: REHABILITATION | Facility: HOSPITAL | Age: 54
End: 2024-08-19
Payer: COMMERCIAL

## 2024-08-19 DIAGNOSIS — M25.532 LEFT WRIST PAIN: Primary | ICD-10-CM

## 2024-08-19 DIAGNOSIS — R29.898 HAND WEAKNESS: ICD-10-CM

## 2024-08-19 PROCEDURE — 97018 PARAFFIN BATH THERAPY: CPT

## 2024-08-19 PROCEDURE — 97035 APP MDLTY 1+ULTRASOUND EA 15: CPT

## 2024-08-19 PROCEDURE — 97110 THERAPEUTIC EXERCISES: CPT

## 2024-08-19 PROCEDURE — 97140 MANUAL THERAPY 1/> REGIONS: CPT

## 2024-08-19 PROCEDURE — 97112 NEUROMUSCULAR REEDUCATION: CPT

## 2024-08-19 NOTE — PROGRESS NOTES
[facility]  Occupational Therapy Treatment Note     Date: 8/19/2024  Name: Dana Winter  Clinic Number: [id number]    Therapy Diagnosis:   Encounter Diagnoses   Name Primary?    Left wrist pain Yes    Hand weakness      Physician: Kash Valera MD    Physician Orders: Eval and Treat  Medical Diagnosis: M79.89 (ICD-10-CM) - Swelling of right hand M25.532,M25.432 (ICD-10-CM) - Pain and swelling of left wrist  Surgical Procedure and Date: N/A  Evaluation Date: 8/12/2024  Insurance Authorization Period Expiration: 12/31/2024  Plan of Care Certification Period: 08/12/24 to 10/07/24  Progress Note due: 09/11/24  Date of Return to MD: 09/20/24     Visit # / Visits authorized: 2 / 12  FOTO: 1/ 3  Initial=48% / Goal=63%    Precautions:  Standard and blood thinners     Time In: 2:00  Time Out: 3:00  Total Billable Time: 60 minutes  (billing reflects skilled 1:1 time)      Subjective     Patient reports: the R IF is bending a little more since starting therapy   She was compliant with home exercise program given last session.   Response to previous treatment:No adverse reactions noted or reported   Functional change: None reported at first treatment after initial evaluation     Pain: 2-3/10  Location: right IF; L wrist/hand     Objective     Objective Measures updated at progress report unless specified.    Treatment     Dana received the treatments listed below:      Modalities performed as follows: Ultrasound performed on the R IF according to parameters below, while L wrist/hand received paraffin    Direct contact modalities after being cleared for contraindications: 3.3 MHz, 1.0 W/cm2, continuous, 10 min to volar aspect of R IF, MCP-PIP, to decrease pain & edema, increase circulation and tissue extensibility    Supervised modalities after being cleared for contraindications:   - Paraffin w/ MHP to L hand, pre-tx to decrease pain & increase tissue extensibility, performed during  Ultrasound to R IF    Manual therapy techniques: Myofacial release, Manual Lymphatic Drainage, Soft tissue Mobilization, and scar management were applied to the: R hand/IF & L wrist/hand for 25 minutes, including:     - use of hand techniques, cupping and IASTM tools to increase blood flow/circulation, improve soft tissue pliability and decrease pain.  Focus was placed on the R IF trigger finger and sclerotic tissue.     Therapeutic exercises to develop strength, endurance, and ROM for 13 minutes, including:  AROM     -TGE's (wave, hook, fist) 5 reps each, R hand   -Finger Extension lifts-IF-RF 5 reps each, R hand   -Isolated IF PIP & DIP 10 reps each, R hand   -Composite fist place & hold 10 reps, 5 sec hold   PROM:    -Isolated IF PIP flexion stretch 10 reps, 15 sec hold   -Composite IF flexion stretch 10 reps, 15 sec hold       Digi-flex 2/10 reps, red, B'ly   PHG 2 10 reps, setting 2 (22#), B'ly       Add Next Visit:    T-putty yellow   -Grasp/Manipulation 3 reps, 5 sec hold    -log rolls w/ tripod & lateral pinch 1 log each     Neuromuscular re-education activities to improve Coordination and Proprioception for 12 minutes. The following activities were included:  Activities: B hands   Dexterciser 3 min   Isospheres  3 min       Add Next Visit:      T-putty Yellow/green mix   -molding 2 min      Patient Education and Home Exercises     Education provided:   - Reviewed HEP  - Progress towards goals     Written Home Exercises Provided: Patient instructed to cont prior HEP.  Exercises were reviewed and Dana was able to demonstrate them prior to the end of the session.  Dana demonstrated good  understanding of the home exercise program provided. See electronic medical record under Patient Instructions for exercises provided during therapy sessions.       Assessment     Pain in R IF and L wrist/hand is minimal today.  Edema in R IF is moderate to minimal.  Increased A/PROM of R IF noted today by the end of  Occupational Therapy treatment.  Patient has good potential for further improvement with continued Occupational Therapy services.   Patient was able to perform all above listed therapeutic exercises without increased pain or difficulty today.     Dana is progressing well towards her goals and there are no updates to goals at this time. Pt prognosis is Good.     Patient will continue to benefit from skilled outpatient occupational therapy to address the deficits listed in the problem list on initial evaluation provide patient/family education and to maximize patient's level of independence in the home and community environment.     Patient's spiritual, cultural and educational needs considered and patient agreeable to plan of care and goals.    Anticipated barriers to occupational therapy: comorbid diagnosis, compliance with HEP and attendance to therapy as recommended     Goals:  Short Term Goals (to be met in 3 weeks) Goal Status:   1) Establish HEP with patient independent in performing accurately [] Goal Met  [] Part Met   2) Patient will report Pain of 2-3 levels lower than initial measures on the pain scale for worst pain in affected hand  [] Goal Met  [] Part Met   3) Patient will increase AROM in SIDDIQUI's of affected fingers, by at least 5-8 degrees for improved performance with ADL's [] Goal Met  [] Part Met   4) Assess or Increase  Strength of affected hand by 3-5 psi to improve functioning in ADL/IADL's [] Goal Met  [] Part Met   5) Edema in affected wrist/hand is at least .2-.3 cm less than initial measures at evaluation [] Goal Met  [] Part Met   6) Patient will be able to achieve less than or equal to a 10-15% improvement on FOTO survey demonstrating overall improved functional ability with upper extremity.  [] Goal Met  [] Part Met         Long Term Goals (to be met by Discharge) Goal Status:   1) Pain in affected hand will be no greater than 1-2/10 while performing ADL's/IADL's [] Goal Met  [] Part  Met   2) Patient will demonstrate WFL AROM in SIDDIQUI's of affected hand/fingers, in all functional planes of mobility, for improved functioning in ADL/IADL's  [] Goal Met  [] Part Met   3) Increase /pinch Strength in affected hand by at least 15-20% of initial measures for improved performance in ADL/IADL's [] Goal Met  [] Part Met   4) Edema in affected hand/finger is trace to none [] Goal Met  [] Part Met   5) Patient will be able to achieve less than or equal to 63% on Hand FOTO survey demonstrating overall improved functional ability with upper extremity.  [] Goal Met  [] Part Met         Plan     Plan of Care Certification: 08/12/24 to 10/07/24.      Outpatient Occupational Therapy 2-3 times weekly for 8 weeks to include the following interventions: Paraffin, Fluidotherapy, Manual therapy/joint mobilizations, Modalities for pain management, US 3 mhz, Therapeutic exercises/activities., Strengthening, Orthotic Fabrication/Fit/Training, Edema Control, Scar Management, Electrical Modalities, and Joint Protection.  Updates/Grading for next session: progress ROM, as tolerated    Ravi Brooks OT   8/19/2024

## 2024-08-28 ENCOUNTER — CLINICAL SUPPORT (OUTPATIENT)
Dept: REHABILITATION | Facility: HOSPITAL | Age: 54
End: 2024-08-28
Payer: COMMERCIAL

## 2024-08-28 DIAGNOSIS — M25.532 LEFT WRIST PAIN: Primary | ICD-10-CM

## 2024-08-28 DIAGNOSIS — R29.898 HAND WEAKNESS: ICD-10-CM

## 2024-08-28 PROCEDURE — 97140 MANUAL THERAPY 1/> REGIONS: CPT

## 2024-08-28 PROCEDURE — 97035 APP MDLTY 1+ULTRASOUND EA 15: CPT

## 2024-08-28 PROCEDURE — 97112 NEUROMUSCULAR REEDUCATION: CPT

## 2024-08-28 PROCEDURE — 97110 THERAPEUTIC EXERCISES: CPT

## 2024-08-28 PROCEDURE — 97018 PARAFFIN BATH THERAPY: CPT

## 2024-08-28 NOTE — PROGRESS NOTES
[facility]  Occupational Therapy Treatment Note     Date: 8/28/2024  Name: Dana Winter  Clinic Number: [id number]    Therapy Diagnosis:   Encounter Diagnoses   Name Primary?    Left wrist pain Yes    Hand weakness      Physician: Kash Valera MD    Physician Orders: Eval and Treat  Medical Diagnosis: M79.89 (ICD-10-CM) - Swelling of right hand M25.532,M25.432 (ICD-10-CM) - Pain and swelling of left wrist  Surgical Procedure and Date: N/A  Evaluation Date: 8/12/2024  Insurance Authorization Period Expiration: 12/31/2024  Plan of Care Certification Period: 08/12/24 to 10/07/24  Progress Note due: 09/11/24  Date of Return to MD: 09/20/24     Visit # / Visits authorized: 3 / 12  FOTO: 1/ 3  Initial=48% / Goal=63%    Precautions:  Standard and blood thinners     Time In: 1:00  Time Out: 2:00  Total Billable Time: 60 minutes  (billing reflects skilled 1:1 time)      Subjective     Patient reports: the R IF is bending more with each therapy visit.   She was compliant with home exercise program given last session.   Response to previous treatment: Less edema and increased A/PROM of R IF  Functional change: able to hold and manipulate light objects using R hand/IF with less pain and difficulty.       Pain: 2/10  Location: right IF; L wrist/hand     Objective     Objective Measures updated at progress report unless specified.    Treatment     Dana received the treatments listed below:      Modalities performed as follows: Ultrasound performed on the R IF according to parameters below, while L wrist/hand received paraffin    Direct contact modalities after being cleared for contraindications: 3.3 MHz, 1.0 W/cm2, continuous, 10 min to volar aspect of R IF, MCP-PIP, to decrease pain & edema, increase circulation and tissue extensibility    Supervised modalities after being cleared for contraindications:   - Paraffin w/ MHP to R hand while coban wrapped in composite finger flexion, 5 minutes  for a low load long duration stretch with heat, to decrease pain & increase tissue extensibility    Manual therapy techniques: Myofacial release, Manual Lymphatic Drainage, Soft tissue Mobilization, and scar management were applied to the: R hand/IF & L wrist/hand for 20 minutes, including:     - use of hand techniques, cupping and IASTM tools to increase blood flow/circulation, improve soft tissue pliability and decrease pain.  Focus was placed on the R IF trigger finger and sclerotic tissue.     Therapeutic exercises to develop strength, endurance, and ROM for 13 minutes, including:  AROM     -TGE's (wave, hook, fist) 5 reps each, R hand   -Finger Extension lifts-IF-RF 5 reps each, R hand   -Isolated IF PIP & DIP 10 reps each, R hand   -Composite fist place & hold 10 reps, 5 sec hold   PROM:    -Isolated IF PIP flexion stretch 10 reps, 15 sec hold   -Composite IF flexion stretch 10 reps, 15 sec hold       Digi-flex 2/10 reps, red, B'ly   PHG 2 10 reps, setting 2 (22#), B'ly       T-putty yellow   -Grasp/Manipulation 3 reps, 5 sec hold      Neuromuscular re-education activities to improve Coordination and Proprioception for 12 minutes. The following activities were included:  Activities: B hands   Dexterciser 3 min   Isospheres  3 min       Add Next Visit:      T-putty Yellow/green mix   -molding 2 min      Patient Education and Home Exercises     Education provided:   - Reviewed HEP  - Progress towards goals     Written Home Exercises Provided: Patient instructed to cont prior HEP.  Exercises were reviewed and Dana was able to demonstrate them prior to the end of the session.  Dana demonstrated good  understanding of the home exercise program provided. See electronic medical record under Patient Instructions for exercises provided during therapy sessions.       Assessment     Pain in R IF and L wrist/hand remains minimal today.  Edema in R IF is minimal.  Continued improvement in A/PROM of R IF noted.  Patient  has good potential for further improvement with continued Occupational Therapy services.   Patient was able to perform all above listed therapeutic exercises without increased pain or difficulty today.     Dana is progressing well towards her goals and there are no updates to goals at this time. Pt prognosis is Good.     Patient will continue to benefit from skilled outpatient occupational therapy to address the deficits listed in the problem list on initial evaluation provide patient/family education and to maximize patient's level of independence in the home and community environment.     Patient's spiritual, cultural and educational needs considered and patient agreeable to plan of care and goals.    Anticipated barriers to occupational therapy: comorbid diagnosis, compliance with HEP and attendance to therapy as recommended     Goals:  Short Term Goals (to be met in 3 weeks) Goal Status:   1) Establish HEP with patient independent in performing accurately [] Goal Met  [] Part Met   2) Patient will report Pain of 2-3 levels lower than initial measures on the pain scale for worst pain in affected hand  [] Goal Met  [] Part Met   3) Patient will increase AROM in SIDDIQUI's of affected fingers, by at least 5-8 degrees for improved performance with ADL's [] Goal Met  [] Part Met   4) Assess or Increase  Strength of affected hand by 3-5 psi to improve functioning in ADL/IADL's [] Goal Met  [] Part Met   5) Edema in affected wrist/hand is at least .2-.3 cm less than initial measures at evaluation [] Goal Met  [] Part Met   6) Patient will be able to achieve less than or equal to a 10-15% improvement on FOTO survey demonstrating overall improved functional ability with upper extremity.  [] Goal Met  [] Part Met         Long Term Goals (to be met by Discharge) Goal Status:   1) Pain in affected hand will be no greater than 1-2/10 while performing ADL's/IADL's [] Goal Met  [] Part Met   2) Patient will demonstrate WFL  AROM in SIDDIQUI's of affected hand/fingers, in all functional planes of mobility, for improved functioning in ADL/IADL's  [] Goal Met  [] Part Met   3) Increase /pinch Strength in affected hand by at least 15-20% of initial measures for improved performance in ADL/IADL's [] Goal Met  [] Part Met   4) Edema in affected hand/finger is trace to none [] Goal Met  [] Part Met   5) Patient will be able to achieve less than or equal to 63% on Hand FOTO survey demonstrating overall improved functional ability with upper extremity.  [] Goal Met  [] Part Met         Plan     Plan of Care Certification: 08/12/24 to 10/07/24.      Outpatient Occupational Therapy 2-3 times weekly for 8 weeks to include the following interventions: Paraffin, Fluidotherapy, Manual therapy/joint mobilizations, Modalities for pain management, US 3 mhz, Therapeutic exercises/activities., Strengthening, Orthotic Fabrication/Fit/Training, Edema Control, Scar Management, Electrical Modalities, and Joint Protection.  Updates/Grading for next session: progress ROM, as tolerated    Ravi Brooks OT   8/28/2024

## 2024-09-04 ENCOUNTER — CLINICAL SUPPORT (OUTPATIENT)
Dept: REHABILITATION | Facility: HOSPITAL | Age: 54
End: 2024-09-04
Payer: COMMERCIAL

## 2024-09-04 DIAGNOSIS — R29.898 HAND WEAKNESS: ICD-10-CM

## 2024-09-04 DIAGNOSIS — M25.532 LEFT WRIST PAIN: Primary | ICD-10-CM

## 2024-09-04 PROCEDURE — 97110 THERAPEUTIC EXERCISES: CPT

## 2024-09-04 PROCEDURE — 97035 APP MDLTY 1+ULTRASOUND EA 15: CPT

## 2024-09-04 PROCEDURE — 97018 PARAFFIN BATH THERAPY: CPT

## 2024-09-04 PROCEDURE — 97140 MANUAL THERAPY 1/> REGIONS: CPT

## 2024-09-04 PROCEDURE — 97112 NEUROMUSCULAR REEDUCATION: CPT

## 2024-09-04 NOTE — PROGRESS NOTES
[facility]  Occupational Therapy Treatment Note     Date: 9/4/2024  Name: Dana Winter  Clinic Number: [id number]    Therapy Diagnosis:   Encounter Diagnoses   Name Primary?    Left wrist pain Yes    Hand weakness      Physician: Kash Valera MD    Physician Orders: Eval and Treat  Medical Diagnosis: M79.89 (ICD-10-CM) - Swelling of right hand M25.532,M25.432 (ICD-10-CM) - Pain and swelling of left wrist  Surgical Procedure and Date: N/A  Evaluation Date: 8/12/2024  Insurance Authorization Period Expiration: 12/31/2024  Plan of Care Certification Period: 08/12/24 to 10/07/24  Progress Note due: 09/11/24  Date of Return to MD: 09/20/24     Visit # / Visits authorized: 4 / 12  FOTO: 1/ 3  Initial=48% / Goal=63%    Precautions:  Standard and blood thinners     Time In: 1:00  Time Out: 2:00  Total Billable Time: 60 minutes  (billing reflects skilled 1:1 time)      Subjective     Patient reports: she continues with stiffness in her R RF but she is seeing some progress.  She also continues to report occasional pain in her L wrist.    She was compliant with home exercise program given last session.   Response to previous treatment: Less edema and increased A/PROM of R IF  Functional change: able to hold and manipulate light objects using R hand/IF with less pain and difficulty.       Pain: 2/10  Location: right IF; L wrist/hand     Objective     Objective Measures updated at progress report unless specified.    Range Of Motion: (Measured in degrees)  Date 8/12/2024 09/04/24     Right Right   Index: MP  0/95 95              PIP     -7/35 -6/53              DIP 0/32 35               CPJ=031 177   Long:  MP 0/85               PIP 0/90               DIP 0/40                YGI=441    Ring:   MP 0/80               PIP 0/90               DIP 0/35                UEG=115    Small:  MP 0/81                PIP 0/85                DIP 0/45                MRB=[id number]        Treatment     Dana received  the treatments listed below:      Modalities performed as follows: Ultrasound performed on the R IF according to parameters below, while L wrist/hand received paraffin    Direct contact modalities after being cleared for contraindications: 3.3 MHz, 1.0 W/cm2, continuous, 10 min to volar aspect of R IF, MCP-PIP, to decrease pain & edema, increase circulation and tissue extensibility    Supervised modalities after being cleared for contraindications:   - Paraffin w/ MHP to R hand while coban wrapped in composite finger flexion, 5 minutes for a low load long duration stretch with heat, to decrease pain & increase tissue extensibility    Manual therapy techniques: Myofacial release, Manual Lymphatic Drainage, Soft tissue Mobilization, and scar management were applied to the: R hand/IF & L wrist/hand for 20 minutes, including:     - use of hand techniques, cupping and IASTM tools to increase blood flow/circulation, improve soft tissue pliability and decrease pain.  Focus was placed on the R IF trigger finger and sclerotic tissue.    Kinesiotaping: single spiral wrap applied to R IF for pain, edema and soft tissue management. Patient instructed on purpose, wear, care, precautions to monitor and removal of KT. Patient verbalized understanding of all instructions provided.      Therapeutic exercises to develop strength, endurance, and ROM for 13 minutes, including:  AROM     -TGE's (wave, hook, fist) 5 reps each, R hand   -Finger Extension lifts-IF-RF 5 reps each, R hand   -Isolated IF PIP & DIP 10 reps each, R hand   -Composite fist place & hold 10 reps, 5 sec hold   PROM:    -Isolated IF PIP flexion stretch 10 reps, 15 sec hold   -Composite IF flexion stretch 10 reps, 15 sec hold       Digi-flex 2/10 reps, red, B'ly   PHG 2 10 reps, setting 2 (22#), B'ly   IF exerciser (PIP flexion) 3/10 reps, w/ 2 rubber bands   T-putty yellow   -Grasp/Manipulation 3 reps, 5 sec hold      Neuromuscular re-education activities to improve  Coordination and Proprioception for 12 minutes. The following activities were included:  Activities: B hands   Dexterciser 3 min   Isospheres  3 min       Add Next Visit:      T-putty Yellow/green mix   -molding 2 min      Patient Education and Home Exercises     Education provided:   - Reviewed HEP  - Progress towards goals     Written Home Exercises Provided: Patient instructed to cont prior HEP.  Exercises were reviewed and Dana was able to demonstrate them prior to the end of the session.  Dana demonstrated good  understanding of the home exercise program provided. See electronic medical record under Patient Instructions for exercises provided during therapy sessions.       Assessment     Pain in R IF and L wrist/hand continues at minimal today.  Edema in R IF also remains minimal.  AROM of R IF TAMs have improved from initial evaluation, as noted above.  Patient has good potential for further improvement with continued Occupational Therapy services.   Patient was able to perform all above listed therapeutic exercises without increased pain or difficulty today.     Dana is progressing well towards her goals and there are no updates to goals at this time. Pt prognosis is Good.     Patient will continue to benefit from skilled outpatient occupational therapy to address the deficits listed in the problem list on initial evaluation provide patient/family education and to maximize patient's level of independence in the home and community environment.     Patient's spiritual, cultural and educational needs considered and patient agreeable to plan of care and goals.    Anticipated barriers to occupational therapy: comorbid diagnosis, compliance with HEP and attendance to therapy as recommended     Goals:  Short Term Goals (to be met in 3 weeks) Goal Status:   1) Establish HEP with patient independent in performing accurately [] Goal Met  [] Part Met   2) Patient will report Pain of 2-3 levels lower than initial  measures on the pain scale for worst pain in affected hand  [] Goal Met  [] Part Met   3) Patient will increase AROM in SIDDIQUI's of affected fingers, by at least 5-8 degrees for improved performance with ADL's [] Goal Met  [] Part Met   4) Assess or Increase  Strength of affected hand by 3-5 psi to improve functioning in ADL/IADL's [] Goal Met  [] Part Met   5) Edema in affected wrist/hand is at least .2-.3 cm less than initial measures at evaluation [] Goal Met  [] Part Met   6) Patient will be able to achieve less than or equal to a 10-15% improvement on FOTO survey demonstrating overall improved functional ability with upper extremity.  [] Goal Met  [] Part Met         Long Term Goals (to be met by Discharge) Goal Status:   1) Pain in affected hand will be no greater than 1-2/10 while performing ADL's/IADL's [] Goal Met  [] Part Met   2) Patient will demonstrate WFL AROM in SIDDIQUI's of affected hand/fingers, in all functional planes of mobility, for improved functioning in ADL/IADL's  [] Goal Met  [] Part Met   3) Increase /pinch Strength in affected hand by at least 15-20% of initial measures for improved performance in ADL/IADL's [] Goal Met  [] Part Met   4) Edema in affected hand/finger is trace to none [] Goal Met  [] Part Met   5) Patient will be able to achieve less than or equal to 63% on Hand FOTO survey demonstrating overall improved functional ability with upper extremity.  [] Goal Met  [] Part Met         Plan     Plan of Care Certification: 08/12/24 to 10/07/24.      Outpatient Occupational Therapy 2-3 times weekly for 8 weeks to include the following interventions: Paraffin, Fluidotherapy, Manual therapy/joint mobilizations, Modalities for pain management, US 3 mhz, Therapeutic exercises/activities., Strengthening, Orthotic Fabrication/Fit/Training, Edema Control, Scar Management, Electrical Modalities, and Joint Protection.  Updates/Grading for next session: progress ROM, as tolerated    Ravi  , OT   9/4/2024

## 2024-09-19 ENCOUNTER — CLINICAL SUPPORT (OUTPATIENT)
Dept: REHABILITATION | Facility: HOSPITAL | Age: 54
End: 2024-09-19
Payer: COMMERCIAL

## 2024-09-19 DIAGNOSIS — R29.898 HAND WEAKNESS: ICD-10-CM

## 2024-09-19 DIAGNOSIS — M25.532 LEFT WRIST PAIN: Primary | ICD-10-CM

## 2024-09-19 PROCEDURE — 97110 THERAPEUTIC EXERCISES: CPT

## 2024-09-19 PROCEDURE — 97112 NEUROMUSCULAR REEDUCATION: CPT

## 2024-09-19 PROCEDURE — 97018 PARAFFIN BATH THERAPY: CPT

## 2024-09-19 PROCEDURE — 97140 MANUAL THERAPY 1/> REGIONS: CPT

## 2024-09-19 NOTE — PROGRESS NOTES
[facility]  Occupational Therapy Treatment Note     Date: 9/19/2024  Name: Dana Winter  Clinic Number: [id number]    Therapy Diagnosis:   Encounter Diagnoses   Name Primary?    Left wrist pain Yes    Hand weakness      Physician: Kash Valera MD    Physician Orders: Eval and Treat  Medical Diagnosis: M79.89 (ICD-10-CM) - Swelling of right hand M25.532,M25.432 (ICD-10-CM) - Pain and swelling of left wrist  Surgical Procedure and Date: N/A  Evaluation Date: 8/12/2024  Insurance Authorization Period Expiration: 12/31/2024  Plan of Care Certification Period: 08/12/24 to 10/07/24  Progress Note due: 09/11/24  Date of Return to MD: 09/20/24     Visit # / Visits authorized: 5 / 12  FOTO: 1/ 3  Initial=48% / Goal=63%    Precautions:  Standard and blood thinners     Time In: 1:00  Time Out: 2:00  Total Billable Time: 60 minutes  (billing reflects skilled 1:1 time)      Subjective     Patient reports: she was unable to attend last 2 scheduled Occupational Therapy treatment sessions due to weather and work schedule conflicts.    She was compliant with home exercise program given last session.   Response to previous treatment: Less edema and increased A/PROM of R IF  Functional change: able to hold and manipulate light objects using R hand/IF with less pain and difficulty.       Pain: 2/10  Location: right IF; L wrist/hand     Objective     Objective Measures updated at progress report unless specified.    Range Of Motion: (Measured in degrees)  Date 8/12/2024 09/04/24     Right Right   Index: MP  0/95 95              PIP     -7/35 -6/53              DIP 0/32 35               VYF=614 177   Long:  MP 0/85               PIP 0/90               DIP 0/40                NCH=203    Ring:   MP 0/80               PIP 0/90               DIP 0/35                WVJ=800    Small:  MP 0/81                PIP 0/85                DIP 0/45                VSS=417        Treatment     Dana received the  treatments listed below:      Modalities performed as follows: Ultrasound performed on the R IF according to parameters below, while L wrist/hand received paraffin      Supervised modalities after being cleared for contraindications:   - Paraffin w/ MHP to B hand while coban wrapped in composite finger flexion, 10 minutes for a low load long duration stretch with heat, to decrease pain & increase tissue extensibility    Manual therapy techniques: Myofacial release, Manual Lymphatic Drainage, Soft tissue Mobilization, and scar management were applied to the: R hand/IF & L wrist/hand for 20 minutes, including:     - use of hand techniques, cupping and IASTM tools to increase blood flow/circulation, improve soft tissue pliability and decrease pain.  Focus was placed on the R IF trigger finger and sclerotic tissue.   - Kinesiotaping: single spiral wrap applied to R IF for pain, edema and soft tissue management. Patient instructed on purpose, wear, care, precautions to monitor and removal of KT. Patient verbalized understanding of all instructions provided.      Therapeutic exercises to develop strength, endurance, and ROM for 13 minutes, including:  AROM     -TGE's (wave, hook, fist) 5 reps each, R hand   -Finger Extension lifts-IF-RF 5 reps each, R hand   -Isolated IF PIP & DIP 10 reps each, R hand   -Composite fist place & hold 10 reps, 5 sec hold   PROM:    -Isolated IF PIP flexion stretch 10 reps, 15 sec hold   -Composite IF flexion stretch 10 reps, 15 sec hold       Digi-flex 3/10 reps, red, B'ly   PHG 3/10 reps, setting 2 (22#), B'ly   IF exerciser (PIP flexion) 3/10 reps, w/ 2 rubber bands   T-putty yellow   -Grasp/Manipulation 3 reps, 5 sec hold      Neuromuscular re-education activities to improve Coordination and Proprioception for 12 minutes. The following activities were included:  Activities: B hands   Dexterciser 3 min   Isospheres  3 min          Patient Education and Home Exercises     Education  provided:   - Reviewed HEP  - Progress towards goals     Written Home Exercises Provided: Patient instructed to cont prior HEP.  Exercises were reviewed and Dana was able to demonstrate them prior to the end of the session.  Dana demonstrated good  understanding of the home exercise program provided. See electronic medical record under Patient Instructions for exercises provided during therapy sessions.       Assessment     Pain in R IF and L wrist/hand remains at minimal.   Edema is localized to R IF PIP, but remains minimal.  R IF stiffness continues, however, limited progress since last visit may be due to missed visits over the past week.   Patient was able to perform all above listed therapeutic exercises without increased pain or difficulty today.     Dana is progressing well towards her goals and there are no updates to goals at this time. Pt prognosis is Good.     Patient will continue to benefit from skilled outpatient occupational therapy to address the deficits listed in the problem list on initial evaluation provide patient/family education and to maximize patient's level of independence in the home and community environment.     Patient's spiritual, cultural and educational needs considered and patient agreeable to plan of care and goals.    Anticipated barriers to occupational therapy: comorbid diagnosis, compliance with HEP and attendance to therapy as recommended     Goals:  Short Term Goals (to be met in 3 weeks) Goal Status:   1) Establish HEP with patient independent in performing accurately [] Goal Met  [] Part Met   2) Patient will report Pain of 2-3 levels lower than initial measures on the pain scale for worst pain in affected hand  [] Goal Met  [] Part Met   3) Patient will increase AROM in SIDDIQUI's of affected fingers, by at least 5-8 degrees for improved performance with ADL's [] Goal Met  [] Part Met   4) Assess or Increase  Strength of affected hand by 3-5 psi to improve functioning  in ADL/IADL's [] Goal Met  [] Part Met   5) Edema in affected wrist/hand is at least .2-.3 cm less than initial measures at evaluation [] Goal Met  [] Part Met   6) Patient will be able to achieve less than or equal to a 10-15% improvement on FOTO survey demonstrating overall improved functional ability with upper extremity.  [] Goal Met  [] Part Met         Long Term Goals (to be met by Discharge) Goal Status:   1) Pain in affected hand will be no greater than 1-2/10 while performing ADL's/IADL's [] Goal Met  [] Part Met   2) Patient will demonstrate WFL AROM in SIDDIQUI's of affected hand/fingers, in all functional planes of mobility, for improved functioning in ADL/IADL's  [] Goal Met  [] Part Met   3) Increase /pinch Strength in affected hand by at least 15-20% of initial measures for improved performance in ADL/IADL's [] Goal Met  [] Part Met   4) Edema in affected hand/finger is trace to none [] Goal Met  [] Part Met   5) Patient will be able to achieve less than or equal to 63% on Hand FOTO survey demonstrating overall improved functional ability with upper extremity.  [] Goal Met  [] Part Met         Plan     Plan of Care Certification: 08/12/24 to 10/07/24.      Outpatient Occupational Therapy 2-3 times weekly for 8 weeks to include the following interventions: Paraffin, Fluidotherapy, Manual therapy/joint mobilizations, Modalities for pain management, US 3 mhz, Therapeutic exercises/activities., Strengthening, Orthotic Fabrication/Fit/Training, Edema Control, Scar Management, Electrical Modalities, and Joint Protection.  Updates/Grading for next session: progress ROM, as tolerated    Ravi Brooks OT   9/19/2024

## 2024-09-20 ENCOUNTER — OFFICE VISIT (OUTPATIENT)
Dept: RHEUMATOLOGY | Facility: CLINIC | Age: 54
End: 2024-09-20
Payer: COMMERCIAL

## 2024-09-20 VITALS
SYSTOLIC BLOOD PRESSURE: 176 MMHG | BODY MASS INDEX: 30.75 KG/M2 | DIASTOLIC BLOOD PRESSURE: 95 MMHG | WEIGHT: 180.13 LBS | HEART RATE: 81 BPM | HEIGHT: 64 IN

## 2024-09-20 DIAGNOSIS — M1A.4410 OTHER SECONDARY CHRONIC GOUT OF RIGHT HAND WITHOUT TOPHUS: ICD-10-CM

## 2024-09-20 DIAGNOSIS — M79.89 SWELLING OF RIGHT HAND: ICD-10-CM

## 2024-09-20 DIAGNOSIS — E79.0 HYPERURICEMIA: Primary | ICD-10-CM

## 2024-09-20 PROCEDURE — 99999 PR PBB SHADOW E&M-EST. PATIENT-LVL IV: CPT | Mod: PBBFAC,,, | Performed by: STUDENT IN AN ORGANIZED HEALTH CARE EDUCATION/TRAINING PROGRAM

## 2024-09-20 NOTE — PROGRESS NOTES
Subjective:      Patient ID: Dana Winter is a 54 y.o. female.    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    HPI:   Interval Hx:  The right 2nd tenosynovitis is better but still limiting flexion of the finger and causing some PIP flexion deformity. The remainder of the right hand has resolved. No swelling in the left hand but she is having ongoing pain in the left volar wrist, same as last visit. The remainder of the joint exam is negative. Finished prednisone taper. Continues with OT which is helping. Rheumatology ROS is otherwise negative.            Initial Hx:  Patient is a 54 yo F who presents for right hand pain and swelling. Patient is not a great historian so some history taken from the chart. Symptoms started just before 11/29/2023 when she was reaching in the washing machine and felt right back muscle strain. 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 A few days later, she noticed right dorsal hand swelling extending to the right fingers. Associated warmth, erythema, significant pain. She showed me a photo on her phone dated 12/4/2023:      She presented to Internal Medicine on 12/4/23 and was given a toradol IM injection and prednisone taper (40 mg for 2 days and taper by 10 mg every 2 days) for presumed gout. At that time uric acid was 11 and CRP was 424. She reports not much improvement with the prednisone taper though her CRP improved to 234 by 12/13/2023. Then on 12/15/2023, she saw her PCP and got Voltaren gel and lab work was done showing worsened anemia (chronic), platelets >1000 (clotted?), procalcitonin 1.34, Cr 1.7 with potassium 5.9 (also high on 12/13/23 and last checked in 1/2023 when it was normal). She went to get x-ray of her hand on 12/15/23 and was short of breath in radiology department  "so a chest x-ray was done and showed some concern for pneumonia. Therefore she was given Augmentin and doxycycline. She reports improvement in the right hand symptoms since then.    During all this, she hasn't taken much for the pain. Naproxen for pain once nightly for some time. Used ibuprofen once or twice.    She endorses unintentional weight loss of 22 lbs in 5 months.    She has never been diagnosed with gout and no family history of gout. No previous joint swelling and pain attacks.  History of kidney stones: no  Family history of gout: no  Previous gout medications: no  Alcohol use: no  High fructose corn syrup use: yes, lemonade, sweet tea, slushies  Low purine diet: no    No skin rashes, malar rash, photosensitivity   No telangiectasias   No calcinosis   No psoriasis   No patchy alopecia   No oral or nasal ulcers   No dry eyes or dry mouth   No pleurisy, chest pain, dyspnea, cough  No dysphagia, diplopia, dysphonia  No muscle weakness   No Raynaud's  No digital ulcers   No cytopenias   No renal issues   No blood clots   No fever, chills, night sweats, or loss of appetite. +weight loss  No new onset headaches   No recurrent conjunctivitis, uveitis, scleritis, or episcleritis   No chronic or bloody diarrhea. No Ulcerative Colitis or Chron's (IBD)  No vaginal or penile and urethral discharge/STDs/ulcers   No unexplained lymphadenopathy  No parotitis   No seizures, strokes, psychosis  No sclerodactyly  No perioral tightness     Social Hx: never smoker, no drug use, no alcohol  Family Hx: No family history of autoimmune disease    Objective:   BP (!) 176/95 (BP Location: Left arm, Patient Position: Sitting, BP Method: Large (Automatic))   Pulse 81   Ht 5' 4" (1.626 m)   Wt 81.7 kg (180 lb 1.9 oz)   BMI 30.92 kg/m²   Physical Exam  Constitutional:       General: She is not in acute distress.     Appearance: Normal appearance.   HENT:      Head: Normocephalic and atraumatic.      Mouth/Throat:      Mouth: " Mucous membranes are moist.      Pharynx: Oropharynx is clear.   Cardiovascular:      Rate and Rhythm: Normal rate and regular rhythm.   Pulmonary:      Effort: Pulmonary effort is normal.      Breath sounds: Normal breath sounds.   Abdominal:      Palpations: Abdomen is soft.      Tenderness: There is no abdominal tenderness.   Musculoskeletal:      Cervical back: Normal range of motion. No tenderness.      Comments: Extensor tenosynovitis in right 2nd finger, slightly improved since last visit. Can make 80-90% fist due to the swelling, improved since last visit. Tenderness of the left volar wrist on the radial side.    Skin:     General: Skin is warm and dry.   Neurological:      Mental Status: She is alert and oriented to person, place, and time. Mental status is at baseline.           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 Symptoms started end of November 2023, a few days after hurting her right hand by dropping a heavy box of mail at work.    Negative MAURI, RF, CCP.  In 12/2023 during the initial episode, ESR >120, , platelets >1000. Now platelets and CRP normalized but ESR still high.  SPEP, NEYMAR without monoclonal peaks.  FLC shows high kappa with high K:L. Pending Hematology evaluation.  Uric acid was 11.9 in 12/2023, now 4.9. Has not been on any ULT. Uric acid of 11.9 also coincided with MARCO.    XR hands 12/2023 reviewed by me:  Uniform joint space narrowing of right 2nd PIP with a marginal rat-bite appearing erosion as well as uniform joint space narrowing of 4th MCP.    MRI hand w/wo contrast 12/21/2023:  1.    Negative for evidence of os myelitis.  Scattered degenerative changes noted.  2.    Exuberant joint effusion fourth MP joint, nonspecific.  Inflammatory arthropathy possible.  Septic arthritis thought less likely.  3.    Extensor and flexor tenosynovitis with exuberant tenosynovitis of the flexor tendons of the index and ring fingers.  Additional exuberant soft tissue edema throughout the index and ring fingers.  No focal fluid collections to suggest abscess.    Suspect the right hand tenosynovitis was due to a chronic gout attack precipitated by trauma. Overall she is improving but surprisingly slowly (9 months now). Would have expected a gout attack to have resolved a long time ago. Maybe overuse trauma from her job is responsible for the slow recovery. She has been gradually improving with OT and a few courses of prednisone.      Plan:  Start colchicine 0.6 mg twice daily. If you get diarrhea, reduce to once daily.  Continue with Occupational Therapy.  Follow up with Dr. Garcia. Consider steroid injection for tenosynovitis if deemed appropriate.  Make a new patient appointment and get labs with Hematology.  Labs, hand XR, and follow up in 2 months. If attack has resolved, consider starting ULT. Negative HLA-.      Follow up in about 2 months (around 11/20/2024).         I spent a total of 40 minutes on the day of the visit.  This includes face to face time and non-face to face time preparing to see the patient (eg, review of tests), obtaining and/or reviewing separately obtained history, documenting clinical  information in the electronic or other health record, independently interpreting results and communicating results to the patient/family/caregiver, or care coordinator.

## 2024-09-20 NOTE — PATIENT INSTRUCTIONS
Start colchicine 0.6 mg twice daily. If you get diarrhea, reduce to once daily.  Continue with Occupational Therapy.  Follow up with Dr. Garcia.  Make a new patient appointment and get labs with Hematology.  Labs, hand XRay, and follow up in 2 months.

## 2024-09-25 ENCOUNTER — CLINICAL SUPPORT (OUTPATIENT)
Dept: REHABILITATION | Facility: HOSPITAL | Age: 54
End: 2024-09-25
Payer: COMMERCIAL

## 2024-09-25 DIAGNOSIS — R29.898 HAND WEAKNESS: ICD-10-CM

## 2024-09-25 DIAGNOSIS — M25.532 LEFT WRIST PAIN: Primary | ICD-10-CM

## 2024-09-25 PROCEDURE — 97140 MANUAL THERAPY 1/> REGIONS: CPT

## 2024-09-25 PROCEDURE — 97018 PARAFFIN BATH THERAPY: CPT

## 2024-09-25 PROCEDURE — 97110 THERAPEUTIC EXERCISES: CPT

## 2024-09-25 PROCEDURE — 97035 APP MDLTY 1+ULTRASOUND EA 15: CPT

## 2024-09-25 NOTE — PROGRESS NOTES
[facility]  Occupational Therapy Treatment Note     Date: 9/25/2024  Name: Dana Winter  Clinic Number: [id number]    Therapy Diagnosis:   Encounter Diagnoses   Name Primary?    Left wrist pain Yes    Hand weakness      Physician: Kash Valera MD    Physician Orders: Eval and Treat  Medical Diagnosis: M79.89 (ICD-10-CM) - Swelling of right hand M25.532,M25.432 (ICD-10-CM) - Pain and swelling of left wrist  Surgical Procedure and Date: N/A  Evaluation Date: 8/12/2024  Insurance Authorization Period Expiration: 12/31/2024  Plan of Care Certification Period: 08/12/24 to 10/07/24  Progress Note due: 09/11/24  Date of Return to MD: 09/30/24 with Dr. Garcia?     Visit # / Visits authorized: 6 / 12  FOTO: 1/ 3  Initial=48% / Goal=63%    Precautions:  Standard and blood thinners     Time In: 1:05  Time Out: 2:05  Total Billable Time: 60 minutes  (billing reflects skilled 1:1 time)      Subjective     Patient reports: she missed the last scheduled Occupational Therapy treatment sessions due to over sleeping.  Patient does work nights at the [employer]  She was compliant with home exercise program given last session.   Response to previous treatment: Less edema and increased A/PROM of R IF  Functional change: able to hold and manipulate light objects using R hand/IF with less pain and difficulty.       Pain: 3/10  Location: right IF; L wrist/hand     Objective     Objective Measures updated at progress report unless specified.    Range Of Motion: (Measured in degrees)  Date 8/12/2024 09/04/24     Right Right   Index: MP  0/95 0/95              PIP     -7/35 -6/53              DIP 0/32 0/35               JRW=[id number]   Long:  MP 0/85               PIP 0/90               DIP 0/40                POB=208    Ring:   MP 0/80               PIP 0/90               DIP 0/35                KDX=497    Small:  MP 0/81                PIP 0/85                DIP 0/45                ANK=032         Treatment     Dana received the treatments listed below:      Modalities performed as follows: Ultrasound performed on the R IF according to parameters below, while L wrist/hand received paraffin    Direct contact modalities after being cleared for contraindications: 3.3 MHz, 1.0 W/cm2, continuous, 10 min to dorsal aspect of R IF, MCP-PIP, to decrease pain & edema, increase circulation and tissue extensibility    Supervised modalities after being cleared for contraindications:   - Paraffin w/ MHP to L hand, during ultrasound of R hand/IF,  to decrease pain & increase tissue extensibility    Manual therapy techniques: Myofacial release, Manual Lymphatic Drainage, Soft tissue Mobilization, and scar management were applied to the: R hand/IF & L wrist/hand for 20 minutes, including:     - use of hand techniques, cupping and IASTM tools to increase blood flow/circulation, improve soft tissue pliability and decrease pain.  Focus was placed on the R IF trigger finger and sclerotic tissue.     Therapeutic exercises to develop strength, endurance, and ROM for 24 minutes, including:  AROM     -TGE's (wave, hook, fist) 5 reps each, R hand   -Isolated IF PIP & DIP 10 reps each, R hand   PROM:    -Isolated IF PIP flexion stretch 10 reps, 15 sec hold   -Composite IF flexion stretch 10 reps, 15 sec hold       Digi-flex 3/10 reps, red, B'ly   PHG 3/10 reps, setting 2 (22#), B'ly   IF exerciser (PIP flexion) 3/10 reps, w/ 2 rubber bands     Neuromuscular re-education activities to improve Coordination and Proprioception for 6 minutes. The following activities were included:  Activities: B hands   Dexterciser 3 min   Isospheres  3 min          Patient Education and Home Exercises     Education provided:   - Reviewed HEP  - Progress towards goals     Written Home Exercises Provided: Patient instructed to cont prior HEP.  Exercises were reviewed and Dana was able to demonstrate them prior to the end of the session.  Dana  demonstrated good  understanding of the home exercise program provided. See electronic medical record under Patient Instructions for exercises provided during therapy sessions.       Assessment     Patient continues to have fluctuations in pain in R IF and L wrist/hand but remains at minimal.   Edema is localized to R IF PIP, but remains minimal.  R IF stiffness continues, however, limited progress since last visit may be due to missed visits over the past week.   Patient was able to perform all above listed therapeutic exercises without increased pain or difficulty today.  Recommend patient to follow up with Ortho MD for unresolved sclerotic tissue in the R IF and L wrist.     Dana is progressing well towards her goals and there are no updates to goals at this time. Pt prognosis is Good.     Patient will continue to benefit from skilled outpatient occupational therapy to address the deficits listed in the problem list on initial evaluation provide patient/family education and to maximize patient's level of independence in the home and community environment.     Patient's spiritual, cultural and educational needs considered and patient agreeable to plan of care and goals.    Anticipated barriers to occupational therapy: comorbid diagnosis, compliance with HEP and attendance to therapy as recommended     Goals:  Short Term Goals (to be met in 3 weeks) Goal Status:   1) Establish HEP with patient independent in performing accurately [] Goal Met  [] Part Met   2) Patient will report Pain of 2-3 levels lower than initial measures on the pain scale for worst pain in affected hand  [] Goal Met  [] Part Met   3) Patient will increase AROM in SIDDIQUI's of affected fingers, by at least 5-8 degrees for improved performance with ADL's [] Goal Met  [] Part Met   4) Assess or Increase  Strength of affected hand by 3-5 psi to improve functioning in ADL/IADL's [] Goal Met  [] Part Met   5) Edema in affected wrist/hand is at least  .2-.3 cm less than initial measures at evaluation [] Goal Met  [] Part Met   6) Patient will be able to achieve less than or equal to a 10-15% improvement on FOTO survey demonstrating overall improved functional ability with upper extremity.  [] Goal Met  [] Part Met         Long Term Goals (to be met by Discharge) Goal Status:   1) Pain in affected hand will be no greater than 1-2/10 while performing ADL's/IADL's [] Goal Met  [] Part Met   2) Patient will demonstrate WFL AROM in SIDDIQUI's of affected hand/fingers, in all functional planes of mobility, for improved functioning in ADL/IADL's  [] Goal Met  [] Part Met   3) Increase /pinch Strength in affected hand by at least 15-20% of initial measures for improved performance in ADL/IADL's [] Goal Met  [] Part Met   4) Edema in affected hand/finger is trace to none [] Goal Met  [] Part Met   5) Patient will be able to achieve less than or equal to 63% on Hand FOTO survey demonstrating overall improved functional ability with upper extremity.  [] Goal Met  [] Part Met         Plan     Plan of Care Certification: 08/12/24 to 10/07/24.      Outpatient Occupational Therapy 2-3 times weekly for 8 weeks to include the following interventions: Paraffin, Fluidotherapy, Manual therapy/joint mobilizations, Modalities for pain management, US 3 mhz, Therapeutic exercises/activities., Strengthening, Orthotic Fabrication/Fit/Training, Edema Control, Scar Management, Electrical Modalities, and Joint Protection.  Updates/Grading for next session: progress ROM, as tolerated    Ravi Brooks OT   9/25/2024

## 2024-09-30 ENCOUNTER — CLINICAL SUPPORT (OUTPATIENT)
Dept: REHABILITATION | Facility: HOSPITAL | Age: 54
End: 2024-09-30
Payer: COMMERCIAL

## 2024-09-30 DIAGNOSIS — M25.532 LEFT WRIST PAIN: Primary | ICD-10-CM

## 2024-09-30 DIAGNOSIS — R29.898 HAND WEAKNESS: ICD-10-CM

## 2024-09-30 PROCEDURE — 97035 APP MDLTY 1+ULTRASOUND EA 15: CPT

## 2024-09-30 PROCEDURE — 97018 PARAFFIN BATH THERAPY: CPT

## 2024-09-30 PROCEDURE — 97140 MANUAL THERAPY 1/> REGIONS: CPT

## 2024-09-30 PROCEDURE — 97110 THERAPEUTIC EXERCISES: CPT

## 2024-09-30 PROCEDURE — 97112 NEUROMUSCULAR REEDUCATION: CPT

## 2024-09-30 NOTE — PROGRESS NOTES
[facility]  Occupational Therapy Treatment Note     Date: 9/30/2024  Name: Dana Winter  Clinic Number: [id number]    Therapy Diagnosis:   Encounter Diagnoses   Name Primary?    Left wrist pain Yes    Hand weakness      Physician: Kash Valera MD    Physician Orders: Eval and Treat  Medical Diagnosis: M79.89 (ICD-10-CM) - Swelling of right hand M25.532,M25.432 (ICD-10-CM) - Pain and swelling of left wrist  Surgical Procedure and Date: N/A  Evaluation Date: 8/12/2024  Insurance Authorization Period Expiration: 12/31/2024  Plan of Care Certification Period: 08/12/24 to 10/07/24  Progress Note due: 09/11/24  Date of Return to MD: 09/30/24 with Dr. Garcia?       Visit # / Visits authorized: 7 / 12  FOTO: 1/ 3  Initial=48% / Goal=63%    Precautions:  Standard and blood thinners     Time In: 1:30  Time Out: 2:30  Total Billable Time: 60 minutes  (billing reflects skilled 1:1 time)      Subjective     Patient reports: she has scheduled a follow up appointment with Dr. Garcia to discuss further treatment options for her L wrist and R IF  She was compliant with home exercise program given last session.   Response to previous treatment: Less edema and increased A/PROM of R IF  Functional change: able to hold and manipulate light objects using R hand/IF with less pain and difficulty.       Pain: 3/10  Location: right IF; L wrist/hand     Objective     Objective Measures updated at progress report unless specified.    Range Of Motion: (Measured in degrees)  Date 8/12/2024 09/04/24     Right Right   Index: MP  0/95 0/95              PIP     -7/35 -6/53              DIP 0/32 0/35               OMP=155 177   Long:  MP 0/85               PIP 0/90               DIP 0/40                BOR=743    Ring:   MP 0/80               PIP 0/90               DIP 0/35                RCO=032    Small:  MP 0/81                PIP 0/85                DIP 0/45                UIQ=931        Treatment     Dana  received the treatments listed below:      Modalities performed as follows: Ultrasound performed on the R IF according to parameters below, while L wrist/hand received paraffin    Direct contact modalities after being cleared for contraindications: 3.3 MHz, 1.0 W/cm2, continuous, 10 min to L volar wrist, to decrease pain & edema, increase circulation and tissue extensibility    Supervised modalities after being cleared for contraindications:   - Paraffin w/ MHP to R hand, during ultrasound of L wrist,  to decrease pain & increase tissue extensibility    Manual therapy techniques: Myofacial release, Manual Lymphatic Drainage, Soft tissue Mobilization, and scar management were applied to the: R hand/IF & L wrist/hand for 20 minutes, including:     - use of hand techniques, cupping and IASTM tools to increase blood flow/circulation, improve soft tissue pliability and decrease pain.  Focus was placed on the R IF trigger finger and sclerotic tissue.     Therapeutic exercises to develop strength, endurance, and ROM for 21 minutes, including:  AROM     -TGE's (wave, hook, fist) 5 reps each, R hand   -Isolated IF PIP & DIP 10 reps each, R hand   PROM:    -Isolated IF PIP flexion stretch 10 reps, 15 sec hold   -Composite IF flexion stretch 10 reps, 15 sec hold       Digi-flex 15 reps, green, B'ly   PHG 3/10 reps, setting 2 (22#), B'ly   IF exerciser (PIP flexion) 3/10 reps, w/ 2 rubber bands     Neuromuscular re-education activities to improve Coordination and Proprioception for 9 minutes. The following activities were included:  Activities: B hands   Dexterciser 3 min, L wrist only   Isospheres  3 min, B hands          Patient Education and Home Exercises     Education provided:   - Reviewed HEP  - Progress towards goals     Written Home Exercises Provided: Patient instructed to cont prior HEP.  Exercises were reviewed and Dana was able to demonstrate them prior to the end of the session.  Dana demonstrated good   understanding of the home exercise program provided. See electronic medical record under Patient Instructions for exercises provided during therapy sessions.       Assessment     Pain in R IF and L wrist/hand remains at minimal, but increases with increased use of B hands in ADL/IADL's.  Edema has decreased slightly in R I PIP, but remains minimal.  Patient continues to have stiffness in R IF, but improvements have been made since initial evaluation.  Patient presents with possible ganglion cyst in her L wrist and in her R IF at the PIP.  Patient was able to perform all above listed therapeutic exercises without increased pain or difficulty today.  Recommend patient to follow up with Ortho MD for unresolved sclerotic tissue in the R IF and L wrist.     Dana is progressing well towards her goals and there are no updates to goals at this time. Pt prognosis is Good.     Patient will continue to benefit from skilled outpatient occupational therapy to address the deficits listed in the problem list on initial evaluation provide patient/family education and to maximize patient's level of independence in the home and community environment.     Patient's spiritual, cultural and educational needs considered and patient agreeable to plan of care and goals.    Anticipated barriers to occupational therapy: comorbid diagnosis, compliance with HEP and attendance to therapy as recommended     Goals:  Short Term Goals (to be met in 3 weeks) Goal Status:   1) Establish HEP with patient independent in performing accurately [] Goal Met  [] Part Met   2) Patient will report Pain of 2-3 levels lower than initial measures on the pain scale for worst pain in affected hand  [] Goal Met  [] Part Met   3) Patient will increase AROM in SIDDIQUI's of affected fingers, by at least 5-8 degrees for improved performance with ADL's [] Goal Met  [] Part Met   4) Assess or Increase  Strength of affected hand by 3-5 psi to improve functioning in  ADL/IADL's [] Goal Met  [] Part Met   5) Edema in affected wrist/hand is at least .2-.3 cm less than initial measures at evaluation [] Goal Met  [] Part Met   6) Patient will be able to achieve less than or equal to a 10-15% improvement on FOTO survey demonstrating overall improved functional ability with upper extremity.  [] Goal Met  [] Part Met         Long Term Goals (to be met by Discharge) Goal Status:   1) Pain in affected hand will be no greater than 1-2/10 while performing ADL's/IADL's [] Goal Met  [] Part Met   2) Patient will demonstrate WFL AROM in SIDDIQUI's of affected hand/fingers, in all functional planes of mobility, for improved functioning in ADL/IADL's  [] Goal Met  [] Part Met   3) Increase /pinch Strength in affected hand by at least 15-20% of initial measures for improved performance in ADL/IADL's [] Goal Met  [] Part Met   4) Edema in affected hand/finger is trace to none [] Goal Met  [] Part Met   5) Patient will be able to achieve less than or equal to 63% on Hand FOTO survey demonstrating overall improved functional ability with upper extremity.  [] Goal Met  [] Part Met         Plan     Plan of Care Certification: 08/12/24 to 10/07/24.      Outpatient Occupational Therapy 2-3 times weekly for 8 weeks to include the following interventions: Paraffin, Fluidotherapy, Manual therapy/joint mobilizations, Modalities for pain management, US 3 mhz, Therapeutic exercises/activities., Strengthening, Orthotic Fabrication/Fit/Training, Edema Control, Scar Management, Electrical Modalities, and Joint Protection.  Updates/Grading for next session: progress ROM, as tolerated    Ravi Brooks OT   9/30/2024

## 2024-10-02 ENCOUNTER — CLINICAL SUPPORT (OUTPATIENT)
Dept: REHABILITATION | Facility: HOSPITAL | Age: 54
End: 2024-10-02
Payer: COMMERCIAL

## 2024-10-02 ENCOUNTER — OFFICE VISIT (OUTPATIENT)
Dept: ORTHOPEDICS | Facility: CLINIC | Age: 54
End: 2024-10-02
Payer: COMMERCIAL

## 2024-10-02 ENCOUNTER — HOSPITAL ENCOUNTER (OUTPATIENT)
Dept: RADIOLOGY | Facility: HOSPITAL | Age: 54
Discharge: HOME | End: 2024-10-02
Attending: STUDENT IN AN ORGANIZED HEALTH CARE EDUCATION/TRAINING PROGRAM
Payer: COMMERCIAL

## 2024-10-02 VITALS — HEIGHT: 64 IN | WEIGHT: 180.13 LBS | BODY MASS INDEX: 30.75 KG/M2

## 2024-10-02 DIAGNOSIS — M79.89 SWELLING OF RIGHT HAND: ICD-10-CM

## 2024-10-02 DIAGNOSIS — M25.641 FINGER STIFFNESS, RIGHT: Primary | ICD-10-CM

## 2024-10-02 DIAGNOSIS — M1A.4410 OTHER SECONDARY CHRONIC GOUT OF RIGHT HAND WITHOUT TOPHUS: ICD-10-CM

## 2024-10-02 DIAGNOSIS — E79.0 HYPERURICEMIA: ICD-10-CM

## 2024-10-02 DIAGNOSIS — M25.532 LEFT WRIST PAIN: Primary | ICD-10-CM

## 2024-10-02 DIAGNOSIS — R29.898 HAND WEAKNESS: ICD-10-CM

## 2024-10-02 PROCEDURE — 97112 NEUROMUSCULAR REEDUCATION: CPT

## 2024-10-02 PROCEDURE — 97110 THERAPEUTIC EXERCISES: CPT

## 2024-10-02 PROCEDURE — 1160F RVW MEDS BY RX/DR IN RCRD: CPT | Mod: CPTII,S$GLB,, | Performed by: STUDENT IN AN ORGANIZED HEALTH CARE EDUCATION/TRAINING PROGRAM

## 2024-10-02 PROCEDURE — 99213 OFFICE O/P EST LOW 20 MIN: CPT | Mod: S$GLB,,, | Performed by: STUDENT IN AN ORGANIZED HEALTH CARE EDUCATION/TRAINING PROGRAM

## 2024-10-02 PROCEDURE — 1159F MED LIST DOCD IN RCRD: CPT | Mod: CPTII,S$GLB,, | Performed by: STUDENT IN AN ORGANIZED HEALTH CARE EDUCATION/TRAINING PROGRAM

## 2024-10-02 PROCEDURE — 99999 PR PBB SHADOW E&M-EST. PATIENT-LVL III: CPT | Mod: PBBFAC,,, | Performed by: STUDENT IN AN ORGANIZED HEALTH CARE EDUCATION/TRAINING PROGRAM

## 2024-10-02 PROCEDURE — 97140 MANUAL THERAPY 1/> REGIONS: CPT

## 2024-10-02 PROCEDURE — 3008F BODY MASS INDEX DOCD: CPT | Mod: CPTII,S$GLB,, | Performed by: STUDENT IN AN ORGANIZED HEALTH CARE EDUCATION/TRAINING PROGRAM

## 2024-10-02 PROCEDURE — 97018 PARAFFIN BATH THERAPY: CPT

## 2024-10-02 PROCEDURE — 73130 X-RAY EXAM OF HAND: CPT | Mod: TC,50

## 2024-10-02 RX ORDER — COLCHICINE 0.6 MG/1
0.6 TABLET ORAL 2 TIMES DAILY
Qty: 60 TABLET | Refills: 0 | Status: SHIPPED | OUTPATIENT
Start: 2024-10-02 | End: 2024-11-01

## 2024-10-02 NOTE — PROGRESS NOTES
[facility] Clinic Follow Up Note    Chief Complaint  Chief Complaint   Patient presents with    Right Hand - Pain     Index finger    Left Wrist - Pain, Swelling     cyst       History of Present Illness  54-year-old right-hand dominant female presents for follow up.  She was previously seen in February of 2024.  At the time, she had right-hand and wrist swelling for 3 months of unknown etiology.  This issue is now improved.  She was referred to therapy at that visit.  She went for a few visits in March then begin therapy again starting in August.  She has been in therapy now for 1.5 months.  Her issue today is that she has stiffness in her right index finger.  Since this incident in the past year, she was now unable to fully flex it.  She finds that it makes it difficult for her to perform activities of daily living.  Pain level is a 4/10.  She recently saw Dr. Valera with Rheumatology who prescribed colchicine.  There was some sort of issue that arose and patient was unable to obtain the medication so I will send a new script today.  Discussed that she can follow up with Rheumatology in the future for further refills.    Review of Systems  Review of systems negative for chest pain, shortness of breath, fevers, chills, nausea/vomiting.    Vital Signs  There were no vitals filed for this visit.    Physical Exam  Constitutional: Appears well-developed and well-nourished. No distress.   HENT:   Head: Normocephalic.   Eyes: EOM are normal.   Pulmonary/Chest: Effort normal.   Neurological: Oriented to person, place, and time.   Psychiatric: Normal mood and affect.     Right Upper Extremity:  No abrasions, lacerations, wounds.  Minimal generalized swelling about the index finger.  Active and passive index finger MCP motion is 0-90 degrees, PIP motion is 0-30 degrees, DIP motion is 0-40 degrees.  The index finger is ulnarly deviated.  Active and passive ring finger D IP motion is 0-40 degrees.  Ring finger PIP and MCP  motion is full.  The fingers are warm with brisk capillary refill.  Sensation is intact in the median, radial, ulnar nerve distributions.  Palpable radial pulse.    Imaging  Bilateral hand x-rays three views were obtained today and independently reviewed by myself.  Severe arthritic changes are noted at the index finger PIPJ joint with a associated ulnar deviation of the finger.  Additionally moderate arthritic changes are noted at the index finger DIPJ joint.      Assessment and Plan  54-year-old female presents for follow up.  She was previously seen in February of 2024 for right-hand and wrist swelling for 3 months.  This is since resolved.  She now has pain and loss of motion at the right index finger.  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  I discussed potential treatment options for this including observation versus injection versus fusion.  She is not interested in the loss of motion which would result from fusion procedure as her main concern today is that she would like more motion in her finger.  We will proceed with observation at this time.  I sent a script for colchicine to her pharmacy as she was unable to obtain this when it was prescribed previously by her rheumatologist.  Discussed that she will need to reach out to Rheumatology for future refills.    Genevieve Garcia MD  Orthopaedic Hand Surgery

## 2024-10-02 NOTE — PROGRESS NOTES
[facility]  Occupational Therapy Treatment Note     Date: 10/2/2024  Name: Dana Winter  Clinic Number: [id number]    Therapy Diagnosis:   Encounter Diagnoses   Name Primary?    Left wrist pain Yes    Hand weakness      Physician: Kash Valera MD    Physician Orders: Eval and Treat  Medical Diagnosis: M79.89 (ICD-10-CM) - Swelling of right hand M25.532,M25.432 (ICD-10-CM) - Pain and swelling of left wrist  Surgical Procedure and Date: N/A  Evaluation Date: 8/12/2024  Insurance Authorization Period Expiration: 12/31/2024  Plan of Care Certification Period: 08/12/24 to 10/07/24  Progress Note due: 09/11/24  Date of Return to MD: 09/30/24 with Dr. Garcia?       Visit # / Visits authorized: 8 / 12  FOTO: 1/ 3  Initial=48% / Goal=63%    Precautions:  Standard and blood thinners     Time In: 1:00  Time Out: 2:00  Total Billable Time: 60 minutes  (billing reflects skilled 1:1 time)      Subjective     Patient reports: she has scheduled a follow up appointment with Dr. Garcia to discuss further treatment options for her L wrist and R IF  She was compliant with home exercise program given last session.   Response to previous treatment: Less edema and increased A/PROM of R IF  Functional change: able to hold and manipulate light objects using R hand/IF with less pain and difficulty.       Pain: 3/10  Location: right IF; L wrist/hand     Objective     Objective Measures updated at progress report unless specified.    Range Of Motion: (Measured in degrees)  Date 8/12/2024 09/04/24     Right Right   Index: MP  0/95 0/95              PIP     -7/35 -6/53              DIP 0/32 0/35               CHO=377 [id number]   Long:  MP 0/85               PIP 0/90               DIP 0/40                YDG=[id number]    Ring:   MP 0/80               PIP 0/90               DIP 0/35                WAX=714    Small:  MP 0/81                PIP 0/85                DIP 0/45                FMR=[id number]        Treatment     Dana  received the treatments listed below:        Supervised modalities after being cleared for contraindications:   - Paraffin w/ MHP to B hand, 10 minutes   to decrease pain & increase tissue extensibility    Manual therapy techniques: Myofacial release, Manual Lymphatic Drainage, Soft tissue Mobilization, and scar management were applied to the: R hand/IF & L wrist/hand for 20 minutes, including:     - use of hand techniques, cupping and IASTM tools to increase blood flow/circulation, improve soft tissue pliability and decrease pain.  Focus was placed on the R IF trigger finger and sclerotic tissue.     Therapeutic exercises to develop strength, endurance, and ROM for 21 minutes, including:  AROM     -TGE's (wave, hook, fist) 5 reps each, R hand   -Isolated IF PIP & DIP 10 reps each, R hand   PROM:    -Isolated IF PIP flexion stretch 10 reps, 15 sec hold   -Composite IF flexion stretch 10 reps, 15 sec hold       Digi-flex 15 reps, green, B'ly   PHG 3/10 reps, setting 2 (22#), B'ly   IF exerciser (PIP flexion) 3/10 reps, w/ 2 rubber bands     Neuromuscular re-education activities to improve Coordination and Proprioception for 9 minutes. The following activities were included:  Activities: B hands   Dexterciser 3 min, L wrist only   Isospheres  3 min, B hands          Patient Education and Home Exercises     Education provided:   - Reviewed HEP  - Progress towards goals     Written Home Exercises Provided: Patient instructed to cont prior HEP.  Exercises were reviewed and Dana was able to demonstrate them prior to the end of the session.  Dana demonstrated good  understanding of the home exercise program provided. See electronic medical record under Patient Instructions for exercises provided during therapy sessions.       Assessment     Pain in R IF and L wrist/hand remains about the same.  Edema continues to decrease in R IF PIP, but remains at minimal.  Stiffness continues in R IF, but gradual improvements have  been made since initial evaluation.  Recommend patient to follow up with referring MD to rule out ganglion cyst in her L wrist and in her R IF at the PIP.  Patient was able to perform all above listed therapeutic exercises without increased pain or difficulty today.       Dana is progressing well towards her goals and there are no updates to goals at this time. Pt prognosis is Good.     Patient will continue to benefit from skilled outpatient occupational therapy to address the deficits listed in the problem list on initial evaluation provide patient/family education and to maximize patient's level of independence in the home and community environment.     Patient's spiritual, cultural and educational needs considered and patient agreeable to plan of care and goals.    Anticipated barriers to occupational therapy: comorbid diagnosis, compliance with HEP and attendance to therapy as recommended     Goals:  Short Term Goals (to be met in 3 weeks) Goal Status:   1) Establish HEP with patient independent in performing accurately [] Goal Met  [] Part Met   2) Patient will report Pain of 2-3 levels lower than initial measures on the pain scale for worst pain in affected hand  [] Goal Met  [] Part Met   3) Patient will increase AROM in SIDDIQUI's of affected fingers, by at least 5-8 degrees for improved performance with ADL's [] Goal Met  [] Part Met   4) Assess or Increase  Strength of affected hand by 3-5 psi to improve functioning in ADL/IADL's [] Goal Met  [] Part Met   5) Edema in affected wrist/hand is at least .2-.3 cm less than initial measures at evaluation [] Goal Met  [] Part Met   6) Patient will be able to achieve less than or equal to a 10-15% improvement on FOTO survey demonstrating overall improved functional ability with upper extremity.  [] Goal Met  [] Part Met         Long Term Goals (to be met by Discharge) Goal Status:   1) Pain in affected hand will be no greater than 1-2/10 while performing  ADL's/IADL's [] Goal Met  [] Part Met   2) Patient will demonstrate WFL AROM in SIDDIQUI's of affected hand/fingers, in all functional planes of mobility, for improved functioning in ADL/IADL's  [] Goal Met  [] Part Met   3) Increase /pinch Strength in affected hand by at least 15-20% of initial measures for improved performance in ADL/IADL's [] Goal Met  [] Part Met   4) Edema in affected hand/finger is trace to none [] Goal Met  [] Part Met   5) Patient will be able to achieve less than or equal to 63% on Hand FOTO survey demonstrating overall improved functional ability with upper extremity.  [] Goal Met  [] Part Met         Plan     Plan of Care Certification: 08/12/24 to 10/07/24.      Outpatient Occupational Therapy 2-3 times weekly for 8 weeks to include the following interventions: Paraffin, Fluidotherapy, Manual therapy/joint mobilizations, Modalities for pain management, US 3 mhz, Therapeutic exercises/activities., Strengthening, Orthotic Fabrication/Fit/Training, Edema Control, Scar Management, Electrical Modalities, and Joint Protection.  Updates/Grading for next session: progress ROM, as tolerated    Ravi Brooks OT   10/2/2024

## 2024-10-10 NOTE — TELEPHONE ENCOUNTER
Care Due:                  Date            Visit Type   Department     Provider  --------------------------------------------------------------------------------                                MYCHART                              FOLLOWUP/OF  [facility] INTERNAL  Susie Mainampati  Last Visit: 03-      [facility] VISIT   MEDICINE       Quan  Next Visit: None Scheduled  None         None Found                                                            Last  Test          Frequency    Reason                     Performed    Due Date  --------------------------------------------------------------------------------    Vitamin D...  12 months..  ergocalciferol...........  01- 01-    Health [facility] Embedded Care Due Messages. Reference number: [id number].   10/10/2024 3:35:39 PM CDT

## 2024-10-11 RX ORDER — ERGOCALCIFEROL 1.25 MG/1
50000 CAPSULE ORAL
Qty: 4 CAPSULE | Refills: 1 | Status: SHIPPED | OUTPATIENT
Start: 2024-10-11

## 2024-11-13 DIAGNOSIS — M1A.4410 OTHER SECONDARY CHRONIC GOUT OF RIGHT HAND WITHOUT TOPHUS: Primary | ICD-10-CM

## 2024-11-13 DIAGNOSIS — E79.0 HYPERURICEMIA: ICD-10-CM

## 2024-11-14 RX ORDER — COLCHICINE 0.6 MG/1
0.6 TABLET ORAL 2 TIMES DAILY
Qty: 60 TABLET | Refills: 11 | Status: SHIPPED | OUTPATIENT
Start: 2024-11-14

## 2024-11-27 ENCOUNTER — OFFICE VISIT (OUTPATIENT)
Dept: INTERNAL MEDICINE | Facility: CLINIC | Age: 54
End: 2024-11-27
Payer: COMMERCIAL

## 2024-11-27 VITALS
WEIGHT: 171.31 LBS | DIASTOLIC BLOOD PRESSURE: 80 MMHG | HEIGHT: 64 IN | RESPIRATION RATE: 18 BRPM | BODY MASS INDEX: 29.24 KG/M2 | SYSTOLIC BLOOD PRESSURE: 138 MMHG | HEART RATE: 108 BPM | OXYGEN SATURATION: 98 %

## 2024-11-27 DIAGNOSIS — E55.9 VITAMIN D DEFICIENCY: ICD-10-CM

## 2024-11-27 DIAGNOSIS — M79.651 BILATERAL THIGH PAIN: ICD-10-CM

## 2024-11-27 DIAGNOSIS — I10 ESSENTIAL HYPERTENSION: Chronic | ICD-10-CM

## 2024-11-27 DIAGNOSIS — Z00.00 ROUTINE GENERAL MEDICAL EXAMINATION AT A HEALTH CARE FACILITY: Primary | ICD-10-CM

## 2024-11-27 DIAGNOSIS — M47.22 OSTEOARTHRITIS OF SPINE WITH RADICULOPATHY, CERVICAL REGION: ICD-10-CM

## 2024-11-27 DIAGNOSIS — N30.00 ACUTE CYSTITIS WITHOUT HEMATURIA: ICD-10-CM

## 2024-11-27 DIAGNOSIS — Z86.2 HISTORY OF THROMBOCYTOSIS: ICD-10-CM

## 2024-11-27 DIAGNOSIS — M79.652 BILATERAL THIGH PAIN: ICD-10-CM

## 2024-11-27 DIAGNOSIS — D50.0 IRON DEFICIENCY ANEMIA DUE TO CHRONIC BLOOD LOSS: ICD-10-CM

## 2024-11-27 DIAGNOSIS — E79.0 HYPERURICEMIA: ICD-10-CM

## 2024-11-27 PROBLEM — R29.898 HAND WEAKNESS: Status: RESOLVED | Noted: 2024-08-12 | Resolved: 2024-11-27

## 2024-11-27 PROBLEM — M25.641 FINGER STIFFNESS, RIGHT: Status: RESOLVED | Noted: 2024-02-21 | Resolved: 2024-11-27

## 2024-11-27 PROBLEM — M79.644 PAIN OF FINGER OF RIGHT HAND: Status: RESOLVED | Noted: 2024-02-21 | Resolved: 2024-11-27

## 2024-11-27 PROBLEM — M25.532 LEFT WRIST PAIN: Status: RESOLVED | Noted: 2024-08-12 | Resolved: 2024-11-27

## 2024-11-27 LAB
25(OH)D3+25(OH)D2 SERPL-MCNC: 23 NG/ML (ref 30–96)
ALBUMIN SERPL BCP-MCNC: 2.8 G/DL (ref 3.5–5.2)
ALP SERPL-CCNC: 144 U/L (ref 40–150)
ALT SERPL W/O P-5'-P-CCNC: 20 U/L (ref 10–44)
ANION GAP SERPL CALC-SCNC: 15 MMOL/L (ref 8–16)
AST SERPL-CCNC: 27 U/L (ref 10–40)
BACTERIA #/AREA URNS HPF: ABNORMAL /HPF
BASOPHILS # BLD AUTO: 0.19 K/UL (ref 0–0.2)
BASOPHILS NFR BLD: 0.9 % (ref 0–1.9)
BILIRUB SERPL-MCNC: 1.8 MG/DL (ref 0.1–1)
BILIRUB UR QL STRIP: NEGATIVE
BUN SERPL-MCNC: 17 MG/DL (ref 6–20)
CALCIUM SERPL-MCNC: 9.8 MG/DL (ref 8.7–10.5)
CHLORIDE SERPL-SCNC: 102 MMOL/L (ref 95–110)
CHOLEST SERPL-MCNC: 194 MG/DL (ref 120–199)
CHOLEST/HDLC SERPL: 5 {RATIO} (ref 2–5)
CK SERPL-CCNC: 34 U/L (ref 20–180)
CLARITY UR: CLEAR
CO2 SERPL-SCNC: 22 MMOL/L (ref 23–29)
COLOR UR: YELLOW
CREAT SERPL-MCNC: 1.7 MG/DL (ref 0.5–1.4)
DIFFERENTIAL METHOD BLD: ABNORMAL
EOSINOPHIL # BLD AUTO: 0 K/UL (ref 0–0.5)
EOSINOPHIL NFR BLD: 0 % (ref 0–8)
ERYTHROCYTE [DISTWIDTH] IN BLOOD BY AUTOMATED COUNT: 14.9 % (ref 11.5–14.5)
ERYTHROCYTE [SEDIMENTATION RATE] IN BLOOD BY PHOTOMETRIC METHOD: 120 MM/HR (ref 0–36)
EST. GFR  (NO RACE VARIABLE): 35.6 ML/MIN/1.73 M^2
ESTIMATED AVG GLUCOSE: 105 MG/DL (ref 68–131)
GLUCOSE SERPL-MCNC: 120 MG/DL (ref 70–110)
GLUCOSE UR QL STRIP: NEGATIVE
HBA1C MFR BLD: 5.3 % (ref 4–5.6)
HCT VFR BLD AUTO: 40.1 % (ref 37–48.5)
HDLC SERPL-MCNC: 39 MG/DL (ref 40–75)
HDLC SERPL: 20.1 % (ref 20–50)
HGB BLD-MCNC: 13 G/DL (ref 12–16)
HGB UR QL STRIP: ABNORMAL
HYALINE CASTS #/AREA URNS LPF: 0 /LPF
IMM GRANULOCYTES # BLD AUTO: 0.17 K/UL (ref 0–0.04)
IMM GRANULOCYTES NFR BLD AUTO: 0.8 % (ref 0–0.5)
KETONES UR QL STRIP: NEGATIVE
LDLC SERPL CALC-MCNC: 127 MG/DL (ref 63–159)
LEUKOCYTE ESTERASE UR QL STRIP: ABNORMAL
LYMPHOCYTES # BLD AUTO: 0.7 K/UL (ref 1–4.8)
LYMPHOCYTES NFR BLD: 3.4 % (ref 18–48)
MCH RBC QN AUTO: 26.2 PG (ref 27–31)
MCHC RBC AUTO-ENTMCNC: 32.4 G/DL (ref 32–36)
MCV RBC AUTO: 81 FL (ref 82–98)
MICROSCOPIC COMMENT: ABNORMAL
MONOCYTES # BLD AUTO: 0.7 K/UL (ref 0.3–1)
MONOCYTES NFR BLD: 3.4 % (ref 4–15)
NEUTROPHILS # BLD AUTO: 18.9 K/UL (ref 1.8–7.7)
NEUTROPHILS NFR BLD: 91.5 % (ref 38–73)
NITRITE UR QL STRIP: POSITIVE
NONHDLC SERPL-MCNC: 155 MG/DL
NRBC BLD-RTO: 0 /100 WBC
PH UR STRIP: 6 [PH] (ref 5–8)
PLATELET # BLD AUTO: 277 K/UL (ref 150–450)
PMV BLD AUTO: 10.8 FL (ref 9.2–12.9)
POTASSIUM SERPL-SCNC: 3.9 MMOL/L (ref 3.5–5.1)
PROT SERPL-MCNC: 7.9 G/DL (ref 6–8.4)
PROT UR QL STRIP: ABNORMAL
RBC # BLD AUTO: 4.97 M/UL (ref 4–5.4)
RBC #/AREA URNS HPF: 15 /HPF (ref 0–4)
SODIUM SERPL-SCNC: 139 MMOL/L (ref 136–145)
SP GR UR STRIP: 1.02 (ref 1–1.03)
SQUAMOUS #/AREA URNS HPF: 1 /HPF
T4 FREE SERPL-MCNC: 1.17 NG/DL (ref 0.71–1.51)
TRIGL SERPL-MCNC: 140 MG/DL (ref 30–150)
TSH SERPL DL<=0.005 MIU/L-ACNC: 0.35 UIU/ML (ref 0.4–4)
URATE SERPL-MCNC: 5.8 MG/DL (ref 2.4–5.7)
URN SPEC COLLECT METH UR: ABNORMAL
WBC # BLD AUTO: 20.62 K/UL (ref 3.9–12.7)
WBC #/AREA URNS HPF: >100 /HPF (ref 0–5)

## 2024-11-27 PROCEDURE — 85652 RBC SED RATE AUTOMATED: CPT | Performed by: FAMILY MEDICINE

## 2024-11-27 PROCEDURE — 85025 COMPLETE CBC W/AUTO DIFF WBC: CPT | Performed by: FAMILY MEDICINE

## 2024-11-27 PROCEDURE — 87086 URINE CULTURE/COLONY COUNT: CPT | Performed by: FAMILY MEDICINE

## 2024-11-27 PROCEDURE — 3079F DIAST BP 80-89 MM HG: CPT | Mod: CPTII,S$GLB,, | Performed by: FAMILY MEDICINE

## 2024-11-27 PROCEDURE — 3008F BODY MASS INDEX DOCD: CPT | Mod: CPTII,S$GLB,, | Performed by: FAMILY MEDICINE

## 2024-11-27 PROCEDURE — 99396 PREV VISIT EST AGE 40-64: CPT | Mod: S$GLB,,, | Performed by: FAMILY MEDICINE

## 2024-11-27 PROCEDURE — 84439 ASSAY OF FREE THYROXINE: CPT | Performed by: FAMILY MEDICINE

## 2024-11-27 PROCEDURE — 1160F RVW MEDS BY RX/DR IN RCRD: CPT | Mod: CPTII,S$GLB,, | Performed by: FAMILY MEDICINE

## 2024-11-27 PROCEDURE — 80053 COMPREHEN METABOLIC PANEL: CPT | Performed by: FAMILY MEDICINE

## 2024-11-27 PROCEDURE — 1159F MED LIST DOCD IN RCRD: CPT | Mod: CPTII,S$GLB,, | Performed by: FAMILY MEDICINE

## 2024-11-27 PROCEDURE — 87186 SC STD MICRODIL/AGAR DIL: CPT | Performed by: FAMILY MEDICINE

## 2024-11-27 PROCEDURE — 99214 OFFICE O/P EST MOD 30 MIN: CPT | Mod: 25,S$GLB,, | Performed by: FAMILY MEDICINE

## 2024-11-27 PROCEDURE — 84443 ASSAY THYROID STIM HORMONE: CPT | Performed by: FAMILY MEDICINE

## 2024-11-27 PROCEDURE — 80061 LIPID PANEL: CPT | Performed by: FAMILY MEDICINE

## 2024-11-27 PROCEDURE — 84550 ASSAY OF BLOOD/URIC ACID: CPT | Performed by: FAMILY MEDICINE

## 2024-11-27 PROCEDURE — 99999 PR PBB SHADOW E&M-EST. PATIENT-LVL V: CPT | Mod: PBBFAC,,, | Performed by: FAMILY MEDICINE

## 2024-11-27 PROCEDURE — 83036 HEMOGLOBIN GLYCOSYLATED A1C: CPT | Performed by: FAMILY MEDICINE

## 2024-11-27 PROCEDURE — 81000 URINALYSIS NONAUTO W/SCOPE: CPT | Performed by: FAMILY MEDICINE

## 2024-11-27 PROCEDURE — 82306 VITAMIN D 25 HYDROXY: CPT | Performed by: FAMILY MEDICINE

## 2024-11-27 PROCEDURE — 87088 URINE BACTERIA CULTURE: CPT | Performed by: FAMILY MEDICINE

## 2024-11-27 PROCEDURE — 82550 ASSAY OF CK (CPK): CPT | Performed by: FAMILY MEDICINE

## 2024-11-27 PROCEDURE — 3075F SYST BP GE 130 - 139MM HG: CPT | Mod: CPTII,S$GLB,, | Performed by: FAMILY MEDICINE

## 2024-11-27 RX ORDER — NITROFURANTOIN 25; 75 MG/1; MG/1
100 CAPSULE ORAL 2 TIMES DAILY
Qty: 10 CAPSULE | Refills: 0 | Status: SHIPPED | OUTPATIENT
Start: 2024-11-27 | End: 2024-12-02

## 2024-11-27 NOTE — PROGRESS NOTES
Subjective:       Patient ID: Dana Winter is a 54 y.o. female presents with   Patient Active Problem List   Diagnosis    Essential hypertension    DJD (degenerative joint disease) of cervical spine    Iron deficiency anemia due to chronic blood loss    Vitamin D deficiency    Overweight (BMI 25.0-29.9)    Hyperuricemia    History of thrombocytosis        Chief Complaint: thigh pain    History of Present Illness    Dana presents today routine annual physicals and complains of bilateral thigh pain and weakness. She reports experiencing intermittent thigh pain, with episodes sometimes becoming severe. She also feels weak and experiences headaches when coughing. She denies having a fever at the time of the visit. She has been prescribed Colchicine twice daily by Dr. Valera, a rheumatologist, for high uric acid levels. However, she is not taking it as prescribed. She reports missing her recent appointment with the rheumatologist.  Denies of any cough chest tightness or difficulty breathing      ROS:  General: -fever, -chills, -fatigue, -weight gain, -weight loss, -loss of appetite  Eyes: -vision changes, -blurry vision, -eye pain, -eye discharge  ENT: -ear pain, -hearing loss, -tinnitus, -nasal congestion, -sore throat  Cardiovascular: -chest pain, -palpitations, -lower extremity edema  Respiratory: -cough, -shortness of breath, -wheezing, -sputum production  Endocrine: -polyuria, -polydipsia, -heat intolerance, -cold intolerance  Gastrointestinal: -abdominal pain, -heartburn, -nausea, -vomiting, -diarrhea, -constipation, -blood in stool  Genitourinary: -dysuria, -urgency, -frequency, -hematuria, -nocturia, -incontinence  Heme & Lymphatic: -easy or excessive bleeding, -easy bruising, -swollen lymph nodes  Musculoskeletal: +muscle pain, -back pain, -joint pain, -joint swelling, +muscle weakness  Skin: -rash, -lesion, -itching, -skin texture changes, -skin color changes  Neurological: +headache, -dizziness,  "-numbness, -tingling, -seizure activity, -speech difficulty, -memory loss, -confusion  Psychiatric: -anxiety, -depression, -sleep difficulty                /80 (BP Location: Right arm, Patient Position: Sitting)   Pulse 108   Resp 18   Ht 5' 4" (1.626 m)   Wt 77.7 kg (171 lb 4.8 oz)   LMP  (LMP Unknown)   SpO2 98%   BMI 29.40 kg/m²   Objective:      Physical Exam  Constitutional:       Appearance: She is well-developed.   HENT:      Head: Normocephalic and atraumatic.   Cardiovascular:      Rate and Rhythm: Normal rate and regular rhythm.      Heart sounds: Normal heart sounds. No murmur heard.  Pulmonary:      Effort: Pulmonary effort is normal.      Breath sounds: Normal breath sounds. No wheezing.   Abdominal:      General: Bowel sounds are normal.      Palpations: Abdomen is soft.      Tenderness: There is no abdominal tenderness.   Musculoskeletal:         General: Tenderness present.      Comments: Positive for bilateral thigh tenderness anteriorly   Skin:     General: Skin is warm and dry.      Findings: No rash.   Neurological:      Mental Status: She is alert and oriented to person, place, and time.   Psychiatric:         Mood and Affect: Mood normal.           Assessment/Plan:   1. Routine general medical examination at a health care facility  -     Urinalysis, Reflex to Urine Culture Urine, Clean Catch; Future; Expected date: 11/27/2024  -     Hemoglobin A1C; Future; Expected date: 11/27/2024  -     TSH; Future; Expected date: 11/27/2024  -     Lipid Panel; Future; Expected date: 11/27/2024  -     Comprehensive Metabolic Panel; Future; Expected date: 11/27/2024  -     CBC Auto Differential; Future; Expected date: 11/27/2024  -     Uric Acid; Future; Expected date: 11/27/2024  -     Sedimentation rate; Future; Expected date: 11/27/2024  -     Vitamin D; Future; Expected date: 11/27/2024  Vital signs stable today.  Clinical exam stable  Continue lifestyle modifications with low-fat and " low-cholesterol diet and exercise 30 minutes daily    2. Essential hypertension  -     Urinalysis, Reflex to Urine Culture Urine, Clean Catch; Future; Expected date: 11/27/2024  -     TSH; Future; Expected date: 11/27/2024  -     Lipid Panel; Future; Expected date: 11/27/2024  -     Comprehensive Metabolic Panel; Future; Expected date: 11/27/2024  Blood pressure is stable currently on losartan hydrochlorothiazide 100/25 mg    3. Osteoarthritis of spine with radiculopathy, cervical region  Overview:  on PT  Stable and asymptomatic    4. Iron deficiency anemia due to chronic blood loss  -     CBC Auto Differential; Future; Expected date: 11/27/2024  Currently taking iron supplements over-the-counter    5. Hyperuricemia  -     Uric Acid; Future; Expected date: 11/27/2024  -     Sedimentation rate; Future; Expected date: 11/27/2024  Encouraged to consider getting back on colchicine as prescribed by Rheumatology and discuss further    6. Vitamin D deficiency  -     Vitamin D; Future; Expected date: 11/27/2024  Will check vitamin-D levels    7. History of thrombocytosis    8. Bilateral thigh pain  -     CK; Future; Expected date: 11/27/2024  -     X-Ray Lumbar Spine 5 View; Future; Expected date: 11/27/2024  Will check further labs but encouraged to take over-the-counter NSAIDs as needed for bilateral thigh pain    Patient has missed work from 11/25- was advised to go back to work on 11/28/2024  Work excuse has been given today         This note was generated with the assistance of ambient listening technology. Verbal consent was obtained by the patient and accompanying visitor(s) for the recording of patient appointment to facilitate this note. I attest to having reviewed and edited the generated note for accuracy, though some syntax or spelling errors may persist. Please contact the author of this note for any clarification.

## 2024-11-27 NOTE — LETTER
November 25, 2024      The [address]  [address] [facility]  [place] [address]  Phone: [phone]  Fax: [phone]       Patient: Dana Winter   YOB: 1970  Date of Visit:  11/25/2024    To Whom It May Concern:    Darlyn Winter  was at [facility] on 11/25/2024. The patient may return to work/school on December 2, 2024 with no restrictions. If you have any questions or concerns, or if I can be of further assistance, please do not hesitate to contact me.    Sincerely,          Serena Saldana LPN

## 2024-11-27 NOTE — LETTER
November 27, 2024      The [address]  [address] [address] [facility]  [place] [address]  Phone: [phone]  Fax: [phone]       Patient: Dana Winter   YOB: 1970  Date of Visit: 11/25/2024    To Whom It May Concern:    Darlyn Winter  was at [facility] on 11/25/2024. The patient may return to work/school on 11/29/2024 with no restrictions. If you have any questions or concerns, or if I can be of further assistance, please do not hesitate to contact me.    Sincerely,            Serena Saldana LPN

## 2024-11-30 LAB — BACTERIA UR CULT: ABNORMAL

## 2024-12-12 ENCOUNTER — OFFICE VISIT (OUTPATIENT)
Dept: ORTHOPEDICS | Facility: CLINIC | Age: 54
End: 2024-12-12
Payer: COMMERCIAL

## 2024-12-12 ENCOUNTER — LAB VISIT (OUTPATIENT)
Dept: LAB | Facility: HOSPITAL | Age: 54
End: 2024-12-12
Attending: STUDENT IN AN ORGANIZED HEALTH CARE EDUCATION/TRAINING PROGRAM
Payer: COMMERCIAL

## 2024-12-12 VITALS — WEIGHT: 171.31 LBS | HEIGHT: 64 IN | BODY MASS INDEX: 29.24 KG/M2

## 2024-12-12 DIAGNOSIS — M79.89 SWELLING OF RIGHT HAND: Primary | ICD-10-CM

## 2024-12-12 DIAGNOSIS — M25.641 STIFFNESS OF RIGHT HAND JOINT: ICD-10-CM

## 2024-12-12 DIAGNOSIS — M79.89 SWELLING OF RIGHT HAND: ICD-10-CM

## 2024-12-12 LAB
ALBUMIN SERPL BCP-MCNC: 2.8 G/DL (ref 3.5–5.2)
ALP SERPL-CCNC: 107 U/L (ref 40–150)
ALT SERPL W/O P-5'-P-CCNC: 13 U/L (ref 10–44)
ANION GAP SERPL CALC-SCNC: 6 MMOL/L (ref 8–16)
AST SERPL-CCNC: 13 U/L (ref 10–40)
BILIRUB SERPL-MCNC: 0.1 MG/DL (ref 0.1–1)
BUN SERPL-MCNC: 17 MG/DL (ref 6–20)
CALCIUM SERPL-MCNC: 8.6 MG/DL (ref 8.7–10.5)
CHLORIDE SERPL-SCNC: 108 MMOL/L (ref 95–110)
CO2 SERPL-SCNC: 27 MMOL/L (ref 23–29)
CREAT SERPL-MCNC: 1.3 MG/DL (ref 0.5–1.4)
EST. GFR  (NO RACE VARIABLE): 49.2 ML/MIN/1.73 M^2
GLUCOSE SERPL-MCNC: 99 MG/DL (ref 70–110)
POTASSIUM SERPL-SCNC: 4.2 MMOL/L (ref 3.5–5.1)
PROT SERPL-MCNC: 6.9 G/DL (ref 6–8.4)
SODIUM SERPL-SCNC: 141 MMOL/L (ref 136–145)

## 2024-12-12 PROCEDURE — 99214 OFFICE O/P EST MOD 30 MIN: CPT | Mod: S$GLB,,, | Performed by: STUDENT IN AN ORGANIZED HEALTH CARE EDUCATION/TRAINING PROGRAM

## 2024-12-12 PROCEDURE — 80053 COMPREHEN METABOLIC PANEL: CPT | Performed by: STUDENT IN AN ORGANIZED HEALTH CARE EDUCATION/TRAINING PROGRAM

## 2024-12-12 PROCEDURE — 3008F BODY MASS INDEX DOCD: CPT | Mod: CPTII,S$GLB,, | Performed by: STUDENT IN AN ORGANIZED HEALTH CARE EDUCATION/TRAINING PROGRAM

## 2024-12-12 PROCEDURE — 36415 COLL VENOUS BLD VENIPUNCTURE: CPT | Performed by: STUDENT IN AN ORGANIZED HEALTH CARE EDUCATION/TRAINING PROGRAM

## 2024-12-12 PROCEDURE — 1159F MED LIST DOCD IN RCRD: CPT | Mod: CPTII,S$GLB,, | Performed by: STUDENT IN AN ORGANIZED HEALTH CARE EDUCATION/TRAINING PROGRAM

## 2024-12-12 PROCEDURE — 99999 PR PBB SHADOW E&M-EST. PATIENT-LVL III: CPT | Mod: PBBFAC,,, | Performed by: STUDENT IN AN ORGANIZED HEALTH CARE EDUCATION/TRAINING PROGRAM

## 2024-12-12 PROCEDURE — 1160F RVW MEDS BY RX/DR IN RCRD: CPT | Mod: CPTII,S$GLB,, | Performed by: STUDENT IN AN ORGANIZED HEALTH CARE EDUCATION/TRAINING PROGRAM

## 2024-12-12 PROCEDURE — 3044F HG A1C LEVEL LT 7.0%: CPT | Mod: CPTII,S$GLB,, | Performed by: STUDENT IN AN ORGANIZED HEALTH CARE EDUCATION/TRAINING PROGRAM

## 2024-12-18 ENCOUNTER — HOSPITAL ENCOUNTER (OUTPATIENT)
Dept: RADIOLOGY | Facility: HOSPITAL | Age: 54
Discharge: HOME | End: 2024-12-18
Attending: STUDENT IN AN ORGANIZED HEALTH CARE EDUCATION/TRAINING PROGRAM
Payer: COMMERCIAL

## 2024-12-18 DIAGNOSIS — M25.641 STIFFNESS OF RIGHT HAND JOINT: ICD-10-CM

## 2024-12-18 DIAGNOSIS — M79.89 SWELLING OF RIGHT HAND: ICD-10-CM

## 2024-12-18 PROCEDURE — 73220 MRI UPPR EXTREMITY W/O&W/DYE: CPT | Mod: 26,RT,, | Performed by: RADIOLOGY

## 2024-12-18 PROCEDURE — 73220 MRI UPPR EXTREMITY W/O&W/DYE: CPT | Mod: TC,PN,RT

## 2024-12-18 PROCEDURE — A9585 GADOBUTROL INJECTION: HCPCS | Mod: PN | Performed by: STUDENT IN AN ORGANIZED HEALTH CARE EDUCATION/TRAINING PROGRAM

## 2024-12-18 PROCEDURE — 25500020 PHARM REV CODE 255: Mod: PN | Performed by: STUDENT IN AN ORGANIZED HEALTH CARE EDUCATION/TRAINING PROGRAM

## 2024-12-18 RX ORDER — GADOBUTROL 604.72 MG/ML
8 INJECTION INTRAVENOUS
Status: COMPLETED | OUTPATIENT
Start: 2024-12-18 | End: 2024-12-18

## 2024-12-18 RX ADMIN — GADOBUTROL 8 ML: 604.72 INJECTION INTRAVENOUS at 08:12

## 2024-12-19 RX ORDER — ERGOCALCIFEROL 1.25 MG/1
50000 CAPSULE ORAL
Qty: 4 CAPSULE | Refills: 1 | Status: SHIPPED | OUTPATIENT
Start: 2024-12-19

## 2024-12-19 NOTE — TELEPHONE ENCOUNTER
Refill Routing Note   Medication(s) are not appropriate for processing by [facility] for the following reason(s):        Outside of protocol    [facility] action(s):  Route               Appointments  past 12m or future 3m with PCP    Date Provider   Last Visit   11/27/2024 Susie Glass MD   Next Visit   Visit date not found Susie Glass MD   ED visits in past 90 days: 0        Note composed:10:11 AM 12/19/2024

## 2024-12-19 NOTE — TELEPHONE ENCOUNTER
No care due was identified.  Health [facility] Embedded Care Due Messages. Reference number: [id number].   12/19/2024 5:50:52 AM CST

## 2024-12-20 ENCOUNTER — HOSPITAL ENCOUNTER (OUTPATIENT)
Dept: RADIOLOGY | Facility: HOSPITAL | Age: 54
Discharge: HOME | End: 2024-12-20
Attending: STUDENT IN AN ORGANIZED HEALTH CARE EDUCATION/TRAINING PROGRAM
Payer: COMMERCIAL

## 2024-12-20 ENCOUNTER — OFFICE VISIT (OUTPATIENT)
Dept: ORTHOPEDICS | Facility: CLINIC | Age: 54
End: 2024-12-20
Payer: COMMERCIAL

## 2024-12-20 ENCOUNTER — HOSPITAL ENCOUNTER (OUTPATIENT)
Dept: CARDIOLOGY | Facility: HOSPITAL | Age: 54
Discharge: HOME | End: 2024-12-20
Attending: STUDENT IN AN ORGANIZED HEALTH CARE EDUCATION/TRAINING PROGRAM
Payer: COMMERCIAL

## 2024-12-20 VITALS — BODY MASS INDEX: 29.24 KG/M2 | WEIGHT: 171.31 LBS | HEIGHT: 64 IN

## 2024-12-20 DIAGNOSIS — Z01.818 PRE-OP TESTING: ICD-10-CM

## 2024-12-20 DIAGNOSIS — M79.89 SWELLING OF RIGHT HAND: Primary | ICD-10-CM

## 2024-12-20 DIAGNOSIS — Z01.818 PRE-OP TESTING: Primary | ICD-10-CM

## 2024-12-20 DIAGNOSIS — M19.049 ARTHRITIS OF HAND: ICD-10-CM

## 2024-12-20 DIAGNOSIS — M25.641 FINGER STIFFNESS, RIGHT: ICD-10-CM

## 2024-12-20 LAB
OHS QRS DURATION: 84 MS
OHS QTC CALCULATION: 432 MS

## 2024-12-20 PROCEDURE — 93005 ELECTROCARDIOGRAM TRACING: CPT

## 2024-12-20 PROCEDURE — 71046 X-RAY EXAM CHEST 2 VIEWS: CPT | Mod: 26,,, | Performed by: RADIOLOGY

## 2024-12-20 PROCEDURE — 93010 ELECTROCARDIOGRAM REPORT: CPT | Mod: ,,, | Performed by: INTERNAL MEDICINE

## 2024-12-20 PROCEDURE — 71046 X-RAY EXAM CHEST 2 VIEWS: CPT | Mod: TC

## 2024-12-20 PROCEDURE — 99999 PR PBB SHADOW E&M-EST. PATIENT-LVL V: CPT | Mod: PBBFAC,,, | Performed by: STUDENT IN AN ORGANIZED HEALTH CARE EDUCATION/TRAINING PROGRAM

## 2024-12-20 RX ORDER — SODIUM CHLORIDE 9 MG/ML
INJECTION, SOLUTION INTRAVENOUS CONTINUOUS
OUTPATIENT
Start: 2024-12-20

## 2024-12-20 RX ORDER — LIDOCAINE HYDROCHLORIDE 10 MG/ML
1 INJECTION, SOLUTION EPIDURAL; INFILTRATION; INTRACAUDAL; PERINEURAL ONCE
OUTPATIENT
Start: 2024-12-20 | End: 2024-12-20

## 2024-12-20 NOTE — PROGRESS NOTES
Hand Surgery Clinic Follow Up Note    Chief Complaint  Chief Complaint   Patient presents with    Right Hand - Numbness, Pain       History of Present Illness  54-year-old right-hand dominant female presents for follow up.  She is here to review the results of her MRI which was obtained 2 days ago.  She has a history of intermittent right hand and wrist swelling.  This is now resolved.  That being said she does have some residual stiffness at the index finger PIPJ joint.  Additionally she has a soft tissue mass which is palpable at the volar radial wrist.  She has no fevers or chills.  She recently completed therapy which did help.  She has not had swelling and several months.  She continues to have pain at the level of the index finger PIPJ joint.  Pain level is a 2/10 today.  She is unable to make a fist.  She is not having any issues with the ring finger at this time. Patient does not fish or interact with exotic animals.    Review of Systems  Review of systems negative for chest pain, shortness of breath, fevers, chills, nausea/vomiting.    Vital Signs  There were no vitals filed for this visit.    Physical Exam  Constitutional: Appears well-developed and well-nourished. No distress.   HENT:   Head: Normocephalic.   Eyes: EOM are normal.   Pulmonary/Chest: Effort normal.   Neurological: Oriented to person, place, and time.   Psychiatric: Normal mood and affect.     Right Upper Extremity:  No abrasions, lacerations, wounds.  No swelling.  Patient has tenderness at the index finger PIPJ joint.  There is a associated ulnar deviation at the index finger PIPJ joint.  Active and passive index finger MCP motion is 0-90 degrees, PIP motion is 0 to 30°, DIP motion is 0-40 degrees.  Active wrist flexion to 60° and extension to 20°.  Full pronation and supination. The fingers are warm with brisk capillary refill. Sensation is intact in the median, radial, ulnar nerve distributions. Palpable radial pulse.     Imaging  MRI  of the right-hand with and without contrast was obtained on 12/18/2024 and independently reviewed by myself.  There is noted to be tenosynovitis of the FCR and palmaris longus with some fluid around the tendons and edema.  There is also evidence of multi articular inflammatory arthropathy, particularly at the index finger PIPJ joint and ring finger MCP joint.  There is also some findings of this at the index finger DIPJ joint as well.    Assessment and Plan  54-year-old female presents for follow up.  She has a history of intermittent right hand and wrist swelling over the past year.  She states the swelling has resolved.  She does have residual stiffness at the index finger PIPJ joint with severe arthritic changes noted.  I discussed potential treatment options with the patient.  I also discussed the patient with Dr. Valera.  We had discussed that it may be beneficial to do a biopsy.  The idea would be to evaluate for a possible mycobacterial infection.  MRI was reviewed.  I discussed and booked patient for synovectomy from the right volar wrist, arthrotomy of the right index finger PIPJ joint and ring finger MCP joint with biopsy.  Risks and benefits of surgery were discussed.  Consent was obtained.  Surgery was booked for 01/14/2025.    Genevieve Garcia MD  Orthopaedic Hand Surgery

## 2025-01-02 ENCOUNTER — PATIENT MESSAGE (OUTPATIENT)
Dept: PREADMISSION TESTING | Facility: HOSPITAL | Age: 55
End: 2025-01-02
Payer: COMMERCIAL

## 2025-01-02 DIAGNOSIS — Z01.818 PRE-OP TESTING: Primary | ICD-10-CM

## 2025-01-08 ENCOUNTER — PATIENT MESSAGE (OUTPATIENT)
Dept: PREADMISSION TESTING | Facility: HOSPITAL | Age: 55
End: 2025-01-08
Payer: COMMERCIAL

## 2025-01-09 ENCOUNTER — LAB VISIT (OUTPATIENT)
Dept: LAB | Facility: HOSPITAL | Age: 55
End: 2025-01-09
Payer: COMMERCIAL

## 2025-01-09 DIAGNOSIS — Z01.818 PRE-OP TESTING: ICD-10-CM

## 2025-01-09 LAB
ERYTHROCYTE [DISTWIDTH] IN BLOOD BY AUTOMATED COUNT: 15.6 % (ref 11.5–14.5)
HCT VFR BLD AUTO: 37.9 % (ref 37–48.5)
HGB BLD-MCNC: 12.1 G/DL (ref 12–16)
MCH RBC QN AUTO: 26.7 PG (ref 27–31)
MCHC RBC AUTO-ENTMCNC: 31.9 G/DL (ref 32–36)
MCV RBC AUTO: 84 FL (ref 82–98)
PLATELET # BLD AUTO: 313 K/UL (ref 150–450)
PMV BLD AUTO: 10.2 FL (ref 9.2–12.9)
RBC # BLD AUTO: 4.54 M/UL (ref 4–5.4)
WBC # BLD AUTO: 5.26 K/UL (ref 3.9–12.7)

## 2025-01-09 PROCEDURE — 85027 COMPLETE CBC AUTOMATED: CPT | Performed by: PHYSICIAN ASSISTANT

## 2025-01-09 PROCEDURE — 36415 COLL VENOUS BLD VENIPUNCTURE: CPT | Performed by: PHYSICIAN ASSISTANT

## 2025-01-13 ENCOUNTER — PATIENT MESSAGE (OUTPATIENT)
Dept: RESPIRATORY THERAPY | Facility: HOSPITAL | Age: 55
End: 2025-01-13
Payer: COMMERCIAL

## 2025-01-13 ENCOUNTER — ANESTHESIA EVENT (OUTPATIENT)
Dept: SURGERY | Facility: HOSPITAL | Age: 55
End: 2025-01-13
Payer: COMMERCIAL

## 2025-01-13 NOTE — ANESTHESIA PREPROCEDURE EVALUATION
01/13/2025  Dana Winter is a 54 y.o., female for colonoscopy.  Past Medical History:   Diagnosis Date    DJD (degenerative joint disease) of cervical spine     on PT    Hypertension      Past Surgical History:   Procedure Laterality Date    APPENDECTOMY      COLONOSCOPY N/A 7/23/2021    Procedure: COLONOSCOPY;  Surgeon: Michelle Harry MD;  Location: [facility];  Service: Endoscopy;  Laterality: N/A;    LAPAROSCOPIC APPENDECTOMY      TUBAL LIGATION         Anesthesia Evaluation    I have reviewed the Patient Summary Reports.     I have reviewed the Nursing Notes. I have reviewed the NPO Status.   I have reviewed the Medications.     Review of Systems  Anesthesia Hx:  No problems with previous Anesthesia   History of prior surgery of interest to airway management or planning:  Previous anesthesia: General          Denies Personal Hx of Anesthesia complications.                    Social:  Non-Smoker, No Alcohol Use       Hematology/Oncology:       -- Anemia:               Hematology Comments: H/O thrombocytosis.                    Cardiovascular:     Hypertension                                          Musculoskeletal:  Arthritis                   Physical Exam  General:  Well nourished       Airway/Jaw/Neck:  Airway Findings: Mouth Opening: Normal     Tongue: Normal      General Airway Assessment: Adult      Mallampati: II   TM Distance: Normal, at least 6 cm   Jaw/Neck Findings:     Neck ROM: Normal ROM   Neck Findings:       Eyes/Ears/Nose:  Eyes/Ears/Nose Findings:     Dental:  Dental Findings: In tact       Heart/Vascular:             Vascular Findings: Normal           Abdomen:  Abdomen Findings: Normal      Musculoskeletal:  Musculoskeletal Findings: Normal   Skin:  Skin Findings: Normal      Mental Status:  Mental Status Findings:  Cooperative, Alert and Oriented         Anesthesia  Plan  Type of Anesthesia, risks & benefits discussed:  Anesthesia Type:  general, Gen ETT, Gen Supraglottic Airway    Patient's Preference:   Plan Factors:          Intra-op Monitoring Plan: standard ASA monitors  Intra-op Monitoring Plan Comments:   Post Op Pain Control Plan: multimodal analgesia and IV/PO Opioids PRN  Post Op Pain Control Plan Comments:     Induction:   IV  Beta Blocker:  Patient is not currently on a Beta-Blocker (No further documentation required).       Informed Consent: Informed consent signed with the Patient and all parties understand the risks and agree with anesthesia plan.  All questions answered.  Anesthesia consent signed with patient.  ASA Score: 2     Day of Surgery Review of History & Physical:    H&P Update referred to the surgeon/provider.          Ready For Surgery From Anesthesia Perspective.         Past Medical History:   Diagnosis Date    DJD (degenerative joint disease) of cervical spine     on PT    Hypertension      Past Surgical History:   Procedure Laterality Date    APPENDECTOMY      COLONOSCOPY N/A 7/23/2021    Procedure: COLONOSCOPY;  Surgeon: Michelle Harry MD;  Location: [facility];  Service: Endoscopy;  Laterality: N/A;    LAPAROSCOPIC APPENDECTOMY      TUBAL LIGATION           Physical Exam  General: Well nourished    Airway:  Mallampati: II   Mouth Opening: Normal  TM Distance: Normal, at least 6 cm  Tongue: Normal  Neck ROM: Normal ROM    Dental:  In tact        Anesthesia Plan  Type of Anesthesia, risks & benefits discussed:    Anesthesia Type: general, Gen ETT, Gen Supraglottic Airway  Intra-op Monitoring Plan: standard ASA monitors  Post Op Pain Control Plan: multimodal analgesia and IV/PO Opioids PRN  Induction:  IV  Informed Consent: Informed consent signed with the Patient and all parties understand the risks and agree with anesthesia plan.  All questions answered.   ASA Score: 2  Day of Surgery Review of History & Physical: H&P Update referred to the  surgeon/provider.    Ready For Surgery From Anesthesia Perspective.     .

## 2025-01-14 ENCOUNTER — HOSPITAL ENCOUNTER (OUTPATIENT)
Dept: RADIOLOGY | Facility: HOSPITAL | Age: 55
Discharge: HOME | End: 2025-01-14
Attending: STUDENT IN AN ORGANIZED HEALTH CARE EDUCATION/TRAINING PROGRAM | Admitting: STUDENT IN AN ORGANIZED HEALTH CARE EDUCATION/TRAINING PROGRAM
Payer: COMMERCIAL

## 2025-01-14 ENCOUNTER — ANESTHESIA (OUTPATIENT)
Dept: SURGERY | Facility: HOSPITAL | Age: 55
End: 2025-01-14
Payer: COMMERCIAL

## 2025-01-14 ENCOUNTER — HOSPITAL ENCOUNTER (OUTPATIENT)
Facility: HOSPITAL | Age: 55
Discharge: HOME | End: 2025-01-14
Attending: STUDENT IN AN ORGANIZED HEALTH CARE EDUCATION/TRAINING PROGRAM | Admitting: STUDENT IN AN ORGANIZED HEALTH CARE EDUCATION/TRAINING PROGRAM
Payer: COMMERCIAL

## 2025-01-14 VITALS
BODY MASS INDEX: 28.98 KG/M2 | WEIGHT: 169.75 LBS | SYSTOLIC BLOOD PRESSURE: 142 MMHG | DIASTOLIC BLOOD PRESSURE: 75 MMHG | TEMPERATURE: 97 F | OXYGEN SATURATION: 100 % | RESPIRATION RATE: 20 BRPM | HEIGHT: 64 IN | HEART RATE: 60 BPM

## 2025-01-14 DIAGNOSIS — M19.049 ARTHRITIS OF HAND: ICD-10-CM

## 2025-01-14 DIAGNOSIS — M79.89 SWELLING OF RIGHT HAND: ICD-10-CM

## 2025-01-14 DIAGNOSIS — Z98.890 POSTOPERATIVE STATE: Primary | ICD-10-CM

## 2025-01-14 DIAGNOSIS — M25.641 FINGER STIFFNESS, RIGHT: ICD-10-CM

## 2025-01-14 LAB
B-HCG UR QL: NEGATIVE
CTP QC/QA: YES

## 2025-01-14 PROCEDURE — 87205 SMEAR GRAM STAIN: CPT | Mod: 59 | Performed by: STUDENT IN AN ORGANIZED HEALTH CARE EDUCATION/TRAINING PROGRAM

## 2025-01-14 PROCEDURE — 71000033 HC RECOVERY, INTIAL HOUR: Performed by: STUDENT IN AN ORGANIZED HEALTH CARE EDUCATION/TRAINING PROGRAM

## 2025-01-14 PROCEDURE — 25000003 PHARM REV CODE 250: Performed by: STUDENT IN AN ORGANIZED HEALTH CARE EDUCATION/TRAINING PROGRAM

## 2025-01-14 PROCEDURE — 63600175 PHARM REV CODE 636 W HCPCS: Performed by: STUDENT IN AN ORGANIZED HEALTH CARE EDUCATION/TRAINING PROGRAM

## 2025-01-14 PROCEDURE — 37000008 HC ANESTHESIA 1ST 15 MINUTES: Performed by: STUDENT IN AN ORGANIZED HEALTH CARE EDUCATION/TRAINING PROGRAM

## 2025-01-14 PROCEDURE — 25000003 PHARM REV CODE 250: Performed by: NURSE ANESTHETIST, CERTIFIED REGISTERED

## 2025-01-14 PROCEDURE — 27200651 HC AIRWAY, LMA: Performed by: ANESTHESIOLOGY

## 2025-01-14 PROCEDURE — 26140 REVISE FINGER JOINT EACH: CPT | Mod: 51,RT,, | Performed by: STUDENT IN AN ORGANIZED HEALTH CARE EDUCATION/TRAINING PROGRAM

## 2025-01-14 PROCEDURE — 88305 TISSUE EXAM BY PATHOLOGIST: CPT | Mod: 26,,, | Performed by: PATHOLOGY

## 2025-01-14 PROCEDURE — 88305 TISSUE EXAM BY PATHOLOGIST: CPT | Mod: 59 | Performed by: PATHOLOGY

## 2025-01-14 PROCEDURE — 87070 CULTURE OTHR SPECIMN AEROBIC: CPT | Mod: 59 | Performed by: STUDENT IN AN ORGANIZED HEALTH CARE EDUCATION/TRAINING PROGRAM

## 2025-01-14 PROCEDURE — 87176 TISSUE HOMOGENIZATION CULTR: CPT | Mod: 91 | Performed by: STUDENT IN AN ORGANIZED HEALTH CARE EDUCATION/TRAINING PROGRAM

## 2025-01-14 PROCEDURE — 26135 REVISE FINGER JOINT EACH: CPT | Mod: 51,RT,, | Performed by: STUDENT IN AN ORGANIZED HEALTH CARE EDUCATION/TRAINING PROGRAM

## 2025-01-14 PROCEDURE — 63600175 PHARM REV CODE 636 W HCPCS: Performed by: ANESTHESIOLOGY

## 2025-01-14 PROCEDURE — 37000009 HC ANESTHESIA EA ADD 15 MINS: Performed by: STUDENT IN AN ORGANIZED HEALTH CARE EDUCATION/TRAINING PROGRAM

## 2025-01-14 PROCEDURE — 36000707: Performed by: STUDENT IN AN ORGANIZED HEALTH CARE EDUCATION/TRAINING PROGRAM

## 2025-01-14 PROCEDURE — 81025 URINE PREGNANCY TEST: CPT | Performed by: STUDENT IN AN ORGANIZED HEALTH CARE EDUCATION/TRAINING PROGRAM

## 2025-01-14 PROCEDURE — 87102 FUNGUS ISOLATION CULTURE: CPT | Mod: 59 | Performed by: STUDENT IN AN ORGANIZED HEALTH CARE EDUCATION/TRAINING PROGRAM

## 2025-01-14 PROCEDURE — 87075 CULTR BACTERIA EXCEPT BLOOD: CPT | Mod: 59 | Performed by: STUDENT IN AN ORGANIZED HEALTH CARE EDUCATION/TRAINING PROGRAM

## 2025-01-14 PROCEDURE — 87116 MYCOBACTERIA CULTURE: CPT | Performed by: STUDENT IN AN ORGANIZED HEALTH CARE EDUCATION/TRAINING PROGRAM

## 2025-01-14 PROCEDURE — 63600175 PHARM REV CODE 636 W HCPCS: Performed by: NURSE ANESTHETIST, CERTIFIED REGISTERED

## 2025-01-14 PROCEDURE — 25115 REMOVE WRIST/FOREARM LESION: CPT | Mod: RT,,, | Performed by: STUDENT IN AN ORGANIZED HEALTH CARE EDUCATION/TRAINING PROGRAM

## 2025-01-14 PROCEDURE — 71000015 HC POSTOP RECOV 1ST HR: Performed by: STUDENT IN AN ORGANIZED HEALTH CARE EDUCATION/TRAINING PROGRAM

## 2025-01-14 PROCEDURE — 87206 SMEAR FLUORESCENT/ACID STAI: CPT | Mod: 91 | Performed by: STUDENT IN AN ORGANIZED HEALTH CARE EDUCATION/TRAINING PROGRAM

## 2025-01-14 PROCEDURE — 36000706: Performed by: STUDENT IN AN ORGANIZED HEALTH CARE EDUCATION/TRAINING PROGRAM

## 2025-01-14 PROCEDURE — 25000003 PHARM REV CODE 250: Performed by: ANESTHESIOLOGY

## 2025-01-14 RX ORDER — GLUCAGON 1 MG
1 KIT INJECTION
Status: DISCONTINUED | OUTPATIENT
Start: 2025-01-14 | End: 2025-01-14 | Stop reason: HOSPADM

## 2025-01-14 RX ORDER — DEXAMETHASONE SODIUM PHOSPHATE 4 MG/ML
INJECTION, SOLUTION INTRA-ARTICULAR; INTRALESIONAL; INTRAMUSCULAR; INTRAVENOUS; SOFT TISSUE
Status: DISCONTINUED | OUTPATIENT
Start: 2025-01-14 | End: 2025-01-14

## 2025-01-14 RX ORDER — ACETAMINOPHEN 500 MG
500 TABLET ORAL EVERY 8 HOURS PRN
Qty: 30 TABLET | Refills: 0 | Status: SHIPPED | OUTPATIENT
Start: 2025-01-14

## 2025-01-14 RX ORDER — MIDAZOLAM HYDROCHLORIDE 1 MG/ML
INJECTION INTRAMUSCULAR; INTRAVENOUS
Status: DISCONTINUED | OUTPATIENT
Start: 2025-01-14 | End: 2025-01-14

## 2025-01-14 RX ORDER — LIDOCAINE HYDROCHLORIDE 20 MG/ML
INJECTION, SOLUTION EPIDURAL; INFILTRATION; INTRACAUDAL; PERINEURAL
Status: DISCONTINUED | OUTPATIENT
Start: 2025-01-14 | End: 2025-01-14

## 2025-01-14 RX ORDER — ONDANSETRON HYDROCHLORIDE 2 MG/ML
INJECTION, SOLUTION INTRAVENOUS
Status: DISCONTINUED | OUTPATIENT
Start: 2025-01-14 | End: 2025-01-14

## 2025-01-14 RX ORDER — ONDANSETRON HYDROCHLORIDE 2 MG/ML
4 INJECTION, SOLUTION INTRAVENOUS DAILY PRN
Status: DISCONTINUED | OUTPATIENT
Start: 2025-01-14 | End: 2025-01-14 | Stop reason: HOSPADM

## 2025-01-14 RX ORDER — OXYCODONE AND ACETAMINOPHEN 5; 325 MG/1; MG/1
1 TABLET ORAL
Status: DISCONTINUED | OUTPATIENT
Start: 2025-01-14 | End: 2025-01-14 | Stop reason: HOSPADM

## 2025-01-14 RX ORDER — BACITRACIN ZINC 500 UNIT/G
OINTMENT (GRAM) TOPICAL
Status: DISCONTINUED | OUTPATIENT
Start: 2025-01-14 | End: 2025-01-14 | Stop reason: HOSPADM

## 2025-01-14 RX ORDER — FENTANYL CITRATE 50 UG/ML
25 INJECTION, SOLUTION INTRAMUSCULAR; INTRAVENOUS EVERY 5 MIN PRN
Status: DISCONTINUED | OUTPATIENT
Start: 2025-01-14 | End: 2025-01-14 | Stop reason: HOSPADM

## 2025-01-14 RX ORDER — TRAMADOL HYDROCHLORIDE 50 MG/1
50 TABLET ORAL EVERY 8 HOURS PRN
Qty: 5 TABLET | Refills: 0 | Status: SHIPPED | OUTPATIENT
Start: 2025-01-14

## 2025-01-14 RX ORDER — LIDOCAINE HYDROCHLORIDE 10 MG/ML
1 INJECTION, SOLUTION EPIDURAL; INFILTRATION; INTRACAUDAL; PERINEURAL ONCE
Status: DISCONTINUED | OUTPATIENT
Start: 2025-01-14 | End: 2025-01-14 | Stop reason: HOSPADM

## 2025-01-14 RX ORDER — DIPHENHYDRAMINE HYDROCHLORIDE 50 MG/ML
INJECTION INTRAMUSCULAR; INTRAVENOUS
Status: DISCONTINUED | OUTPATIENT
Start: 2025-01-14 | End: 2025-01-14

## 2025-01-14 RX ORDER — SODIUM CHLORIDE 9 MG/ML
INJECTION, SOLUTION INTRAVENOUS CONTINUOUS
Status: DISCONTINUED | OUTPATIENT
Start: 2025-01-14 | End: 2025-01-14 | Stop reason: HOSPADM

## 2025-01-14 RX ORDER — PROPOFOL 10 MG/ML
VIAL (ML) INTRAVENOUS
Status: DISCONTINUED | OUTPATIENT
Start: 2025-01-14 | End: 2025-01-14

## 2025-01-14 RX ORDER — MEPERIDINE HYDROCHLORIDE 25 MG/ML
12.5 INJECTION INTRAMUSCULAR; INTRAVENOUS; SUBCUTANEOUS ONCE AS NEEDED
Status: DISCONTINUED | OUTPATIENT
Start: 2025-01-14 | End: 2025-01-14 | Stop reason: HOSPADM

## 2025-01-14 RX ORDER — BUPIVACAINE HYDROCHLORIDE 2.5 MG/ML
INJECTION, SOLUTION EPIDURAL; INFILTRATION; INTRACAUDAL
Status: DISCONTINUED
Start: 2025-01-14 | End: 2025-01-14 | Stop reason: HOSPADM

## 2025-01-14 RX ORDER — LIDOCAINE HYDROCHLORIDE 10 MG/ML
INJECTION, SOLUTION EPIDURAL; INFILTRATION; INTRACAUDAL; PERINEURAL
Status: DISCONTINUED
Start: 2025-01-14 | End: 2025-01-14 | Stop reason: HOSPADM

## 2025-01-14 RX ORDER — EPHEDRINE SULFATE 50 MG/ML
INJECTION, SOLUTION INTRAVENOUS
Status: DISCONTINUED | OUTPATIENT
Start: 2025-01-14 | End: 2025-01-14

## 2025-01-14 RX ORDER — BUPIVACAINE HYDROCHLORIDE 2.5 MG/ML
INJECTION, SOLUTION EPIDURAL; INFILTRATION; INTRACAUDAL
Status: DISCONTINUED | OUTPATIENT
Start: 2025-01-14 | End: 2025-01-14 | Stop reason: HOSPADM

## 2025-01-14 RX ORDER — CEFAZOLIN SODIUM 1 G/3ML
INJECTION, POWDER, FOR SOLUTION INTRAMUSCULAR; INTRAVENOUS
Status: DISCONTINUED | OUTPATIENT
Start: 2025-01-14 | End: 2025-01-14

## 2025-01-14 RX ORDER — BACITRACIN ZINC 500 [USP'U]/G
OINTMENT TOPICAL
Status: DISCONTINUED
Start: 2025-01-14 | End: 2025-01-14 | Stop reason: HOSPADM

## 2025-01-14 RX ORDER — LIDOCAINE HYDROCHLORIDE 10 MG/ML
INJECTION, SOLUTION EPIDURAL; INFILTRATION; INTRACAUDAL; PERINEURAL
Status: DISCONTINUED | OUTPATIENT
Start: 2025-01-14 | End: 2025-01-14 | Stop reason: HOSPADM

## 2025-01-14 RX ORDER — NAPROXEN 500 MG/1
500 TABLET ORAL EVERY 8 HOURS PRN
Qty: 30 TABLET | Refills: 0 | Status: SHIPPED | OUTPATIENT
Start: 2025-01-14

## 2025-01-14 RX ORDER — FENTANYL CITRATE 50 UG/ML
INJECTION, SOLUTION INTRAMUSCULAR; INTRAVENOUS
Status: DISCONTINUED | OUTPATIENT
Start: 2025-01-14 | End: 2025-01-14

## 2025-01-14 RX ORDER — SODIUM CHLORIDE, SODIUM LACTATE, POTASSIUM CHLORIDE, CALCIUM CHLORIDE 600; 310; 30; 20 MG/100ML; MG/100ML; MG/100ML; MG/100ML
INJECTION, SOLUTION INTRAVENOUS CONTINUOUS
Status: DISCONTINUED | OUTPATIENT
Start: 2025-01-14 | End: 2025-01-14 | Stop reason: HOSPADM

## 2025-01-14 RX ADMIN — FENTANYL CITRATE 25 MCG: 50 INJECTION INTRAMUSCULAR; INTRAVENOUS at 12:01

## 2025-01-14 RX ADMIN — ONDANSETRON 4 MG: 2 INJECTION INTRAMUSCULAR; INTRAVENOUS at 11:01

## 2025-01-14 RX ADMIN — EPHEDRINE SULFATE 10 MG: 50 INJECTION INTRAVENOUS at 11:01

## 2025-01-14 RX ADMIN — LIDOCAINE HYDROCHLORIDE 70 MG: 20 INJECTION, SOLUTION EPIDURAL; INFILTRATION; INTRACAUDAL; PERINEURAL at 10:01

## 2025-01-14 RX ADMIN — MIDAZOLAM 2 MG: 1 INJECTION INTRAMUSCULAR; INTRAVENOUS at 10:01

## 2025-01-14 RX ADMIN — SODIUM CHLORIDE, POTASSIUM CHLORIDE, SODIUM LACTATE AND CALCIUM CHLORIDE: 600; 310; 30; 20 INJECTION, SOLUTION INTRAVENOUS at 10:01

## 2025-01-14 RX ADMIN — OXYCODONE HYDROCHLORIDE AND ACETAMINOPHEN 1 TABLET: 5; 325 TABLET ORAL at 12:01

## 2025-01-14 RX ADMIN — SODIUM CHLORIDE: 0.9 INJECTION, SOLUTION INTRAVENOUS at 11:01

## 2025-01-14 RX ADMIN — CEFAZOLIN 2 G: 330 INJECTION, POWDER, FOR SOLUTION INTRAMUSCULAR; INTRAVENOUS at 11:01

## 2025-01-14 RX ADMIN — EPHEDRINE SULFATE 5 MG: 50 INJECTION INTRAVENOUS at 11:01

## 2025-01-14 RX ADMIN — FENTANYL CITRATE 50 MCG: 50 INJECTION, SOLUTION INTRAMUSCULAR; INTRAVENOUS at 11:01

## 2025-01-14 RX ADMIN — ONDANSETRON 4 MG: 2 INJECTION INTRAMUSCULAR; INTRAVENOUS at 12:01

## 2025-01-14 RX ADMIN — DEXAMETHASONE SODIUM PHOSPHATE 4 MG: 4 INJECTION, SOLUTION INTRA-ARTICULAR; INTRALESIONAL; INTRAMUSCULAR; INTRAVENOUS; SOFT TISSUE at 10:01

## 2025-01-14 RX ADMIN — DIPHENHYDRAMINE HYDROCHLORIDE 6.25 MG: 50 INJECTION INTRAMUSCULAR; INTRAVENOUS at 11:01

## 2025-01-14 RX ADMIN — PROPOFOL 40 MG: 10 INJECTION, EMULSION INTRAVENOUS at 11:01

## 2025-01-14 RX ADMIN — PROPOFOL 150 MG: 10 INJECTION, EMULSION INTRAVENOUS at 10:01

## 2025-01-14 NOTE — DISCHARGE INSTRUCTIONS
After Hand Surgery  After surgery, the better you take care of yourself--especially your hand--the sooner it will heal. Follow your surgeons instructions. Try not to bump your hand, and dont move or lift anything while youre still wearing bandages, a splint, or a cast.  Care for your hand    Keep your hand elevated above heart level as much as possible for the first several days after surgery. This helps reduce swelling and pain.  To help prevent infection and speed healing, take care not to get your cast or bandages wet.  Keep dressing clean, dry, & intact until your follow up appointment.   Relieve pain as directed  Your surgeon may prescribe pain medicine or suggest you take an anti-inflammatory medicine. You might also be instructed to apply ice (or another cold source) to your hand. If you use ice cubes, put them in a plastic bag and rest it on top of your bandages. Leave the cold source on your hand for as long as its comfortable. Do this several times a day for the first few days after surgery. It may take several minutes before you can feel the cold through the cast or bandages.  Follow up with your surgeon  During a follow-up visit after surgery, your surgeon will check your progress. The stitches, bandages, splint, or cast may be removed. A new cast or splint may be placed. If your hand has healed enough, your surgeon may prescribe exercises.  Do prescribed hand exercises  Your surgeon may recommend that you do exercises. These may be done under the guidance of a physical or occupational therapist. The exercises strengthen your hand, help you regain flexibility, and restore proper function. Do the exercises as advised.  Call your surgeon if you have...  A fever higher than 100.4°F (38.0°C) taken by mouth  Side effects from your medicine, such as prolonged nausea  A wet or loose dressing, or a dressing that is too tight  Excessive bleeding  Increased, ongoing pain or numbness  Signs of infection (such  as drainage, warmth, or redness) at the incision site   Date Last Reviewed: 11/11/2015  © 4471-8170 [employer]. [address] [place] [address], [place] [address]. All rights reserved. This information is not intended as a substitute for professional medical care. Always follow your healthcare professional's instructions.

## 2025-01-14 NOTE — OP NOTE
[facility]  Orthopaedic Hand Surgery  Operative Note    SUMMARY     Date of Procedure: 1/14/2025     Procedure:   88503 Right wrist FCR flexor synovectomy  14294 Right ring finger MCP synovectomy  21879 Right index finger PIP synovectomy    Surgeons and Role:     * Genevieve Garcia MD - Primary    Assistant:   Shanita Ross, First Assist Student  WINSOME Trejo Student    Pre-Operative Diagnosis:   Finger stiffness, right index and ring fingers [M25.641]  Swelling of right hand [M79.89]  Arthritis of hand [M19.049]    Post-Operative Diagnosis:   Same    Anesthesia: General, Local    Indication for Procedure:  54 year old female seen multiple times in my clinic over the past year or so. She has a history of intermittent right hand and wrist swelling. She has gone on to develop arthritis in multiple joints, worse in the right index PIP joint. I discussed the patient with her rheumatologist, Dr. Valera. We discussed possible concern for a mycobacterial infection. I discussed and booked patient for synovectomy from the right volar wrist/FCR tendon, right index PIP joint, right ring finger MCP joint. Will obtain cultures and send for permanent. Consent was obtained.    Operative Findings (including complications, if any): Synovitic tissue noted in the FCR tendon sheath, ring finger MCP joint, and index finger PIP joint    Description of Technical Procedures:   The patient was brought to the operating room and laid supine.  A tourniquet was placed at the right upper arm and the arm prepped with alcohol/chloraprep and draped in the usual sterile surgical fashion.  Preoperative time out was performed with confirmation of correct patient, side, and site, identification of all personnel, dry prep, and confirmation of all needed equipment. Antibiotics were held until after cultures were obtained.  When this was completed, I proceeded with the surgery.     First synovectomy of the right wrist FCR tendon  was performed. Tourniquet was let up to 250mm Hg. A longitudinal incision was made overlying the soft tissue mass/swelling over the volar radial wrist. Tenotomies were used to dissect through soft tissue until the FCR tendon sheath was encountered. A knife was used to incise through the roof of the FCR tendon sheath. There was noted to be synovitic tissue and some early degeneration of the FCR tendon. The tendon was intact. A knife and tenotomy scissors were used to remove all synovitic tissue from around the FCR tendon. Synovitic tissue was sent for permanent as well as culture (aerobic, anaerobic, fungal, AFB). I would have liked to send fluid to assess for crystals but there was no fluid present. The wound was copiously irrigated.    Next, synovectomy of the right ring finger MCP joint was performed. An incision was made over the dorsal aspect of the MCP joint, curving ulnarly over the metacarpal head. Tenotomies were used to dissect through soft tissue. A knife was used to incise through the ulnar sagittal band. The capsule was exposed. A longitudinal incision was made in the capsule. Synovitis was noted in the joint.  A knife and tenotomy scissors were used to excise the synovitic tissue from the MCP joint.  Synovitic tissue was sent for permanent as well as culture (aerobic, anaerobic, fungal, AFB).  Again, I would have like to send fluid to assess for crystals but there was no fluid present.  The metacarpal head was noted to have chondral thinning.  The wound was copiously irrigated.  The sagittal band was repaired with 3-0 Monocryl.    Next, synovectomy of the right index finger PIPJ joint was performed.  An incision was made mid axial on the radial side of the PIPJ joint given that there was a ulnar deviation type deformity of the joint.  Tenotomies were used to dissect through soft tissue.  A knife was used to make a longitudinal incision between the collateral ligaments and the central slip to enter the  PIPJ joint.  Synovitic tissue was visualized.  Synovitic tissue was excised with a knife. Synovitic tissue was sent for permanent as well as culture (aerobic, anaerobic, fungal, AFB).  Again, I would have like to send fluid to assess for crystals but there was no fluid present.  Severe arthritic changes were noted in the joint with visible subchondral bone.  The wound was copiously irrigated.  At this point, Ancef IV was given.    Tourniquet was let down. Hemostasis was obtained. 10cc of lidocaine 1% without epinephrine mixed with 0.25% marcaine was injected as local anesthetic. Incisions were closed with 3-0 monocryl subcutaneous and 4-0 nylon for skin. Bacitracin, adaptic, 4x4, webril, and ace wrap was placed. All sponge, needle, and instrument counts were correct at the conclusion of the procedure.  The patient was awakened and taken to the recovery room in stable condition.    Estimated Blood Loss (EBL): 3cc           Implants: * No implants in log *    Specimens:   Specimen (24h ago, onward)       Start     Ordered    01/14/25 1132  Specimen to Pathology, Surgery Orthopedics  Once        Comments: Pre-op Diagnosis: Finger stiffness, right [M25.641]Swelling of right hand [M79.89]Arthritis of hand [M19.049]Procedure(s):SYNOVECTOMY, WRISTARTHROTOMY,IP JOINT,FINGER,WITH BIOPSY Number of specimens: 3Name of specimens: 1. Right wrist FCR synovitic tissue2. Right ring finger MCP synovitic tissue3. Right index PIP synovitic tissue     References:    Click here for ordering Quick Tip   Question Answer Comment   Procedure Type: Orthopedics    Specimen Class: Routine/Screening    Release to patient Immediate        01/14/25 0535                            Condition: Good    Disposition: PACU - hemodynamically stable.    Attestation: I was present and scrubbed for the entire procedure.    Genevieve Garcia MD  Orthopaedic Hand Surgery

## 2025-01-14 NOTE — ANESTHESIA POSTPROCEDURE EVALUATION
Anesthesia Post Evaluation    Patient: Dana Winter    Procedure(s) Performed: Procedure(s) (LRB):  SYNOVECTOMY, WRIST (Right)  ARTHROTOMY,IP JOINT,FINGER,WITH BIOPSY (Right)    Final Anesthesia Type: general      Patient location during evaluation: PACU  Patient participation: Yes- Able to Participate  Level of consciousness: awake and alert and oriented  Post-procedure vital signs: reviewed and stable  Pain management: adequate  Airway patency: patent    PONV status at discharge: No PONV  Anesthetic complications: no      Cardiovascular status: blood pressure returned to baseline, stable and hemodynamically stable  Respiratory status: unassisted  Hydration status: euvolemic  Follow-up not needed.              Vitals Value Taken Time   /82 01/14/25 1252   Temp 36.3 °C (97.3 °F) 01/14/25 1200   Pulse 72 01/14/25 1253   Resp 14 01/14/25 1253   SpO2 99 % 01/14/25 1253   Vitals shown include unfiled device data.      Event Time   Out of Recovery 12:40:00         Pain/Fredrick Score: Pain Rating Prior to Med Admin: 10 (1/14/2025 12:26 PM)  Fredrick Score: 5 (1/14/2025 12:00 PM)

## 2025-01-14 NOTE — ANESTHESIA PROCEDURE NOTES
Intubation    Date/Time: 1/14/2025 10:49 AM    Performed by: Ladi Montez CRNA  Authorized by: Elif Taylor MD    Intubation:     Induction:  Intravenous    Intubated:  Postinduction    Mask Ventilation:  Not attempted    Attempts:  1    Attempted By:  CRNA    Difficult Airway Encountered?: No      Complications:  None    Airway Device:  Supraglottic airway/LMA    Airway Device Size:  4.0 (iGel)    Style/Cuff Inflation:  Uncuffed    Secured at:  The lips    Placement Verified By:  Capnometry    Findings Post-Intubation:  Atraumatic/condition of teeth unchanged

## 2025-01-14 NOTE — DISCHARGE SUMMARY
[facility]  Discharge Note  Short Stay    Procedure(s) (LRB):  53012 Right wrist FCR flexor synovectomy  72010 Right ring finger MCP synovectomy  07397 Right index finger PIP synovectomy    OUTCOME: Patient tolerated treatment/procedure well without complication and is now ready for discharge.    DISPOSITION: Home or Self Care    FINAL DIAGNOSIS:  Intermittent swelling and pain episodes involving multiple joints in the right hand as well as the FCR tendon sheath resulting in arthritic changes    FOLLOWUP: In clinic    DISCHARGE INSTRUCTIONS:    Discharge Procedure Orders   Diet Adult Regular     Leave dressing on - Keep it clean, dry, and intact until clinic visit     Weight bearing restrictions (specify):   Order Comments: Nonweightbearing to the operative hand.        TIME SPENT ON DISCHARGE: 5 minutes

## 2025-01-14 NOTE — TRANSFER OF CARE
"Anesthesia Transfer of Care Note    Patient: Dana Winter    Procedure(s) Performed: Procedure(s) (LRB):  SYNOVECTOMY, WRIST (Right)  ARTHROTOMY,IP JOINT,FINGER,WITH BIOPSY (Right)    Patient location: PACU    Anesthesia Type: general    Transport from OR: Transported from OR on room air with adequate spontaneous ventilation    Post pain: adequate analgesia    Post assessment: no apparent anesthetic complications and tolerated procedure well    Post vital signs: stable    Level of consciousness: awake and responds to stimulation    Nausea/Vomiting: no nausea/vomiting    Complications: none    Transfer of care protocol was followed      Last vitals: Visit Vitals  /82 (BP Location: Right arm, Patient Position: Sitting)   Pulse 69   Temp 36.1 °C (97 °F) (Temporal)   Resp 14   Ht 5' 4" (1.626 m)   Wt 77 kg (169 lb 12.1 oz)   LMP 12/04/2024 (Approximate)   SpO2 99%   Breastfeeding No   BMI 29.14 kg/m²     "

## 2025-01-14 NOTE — INTERVAL H&P NOTE
The patient has been examined and the H&P has been reviewed:    I concur with the findings and no changes have occurred since H&P was written.    Surgery risks, benefits and alternative options discussed and understood by patient/family.    Genevieve Garcia MD  Orthopaedic Hand Surgery

## 2025-01-15 LAB
FINAL PATHOLOGIC DIAGNOSIS: NORMAL
GRAM STN SPEC: NORMAL
GROSS: NORMAL
Lab: NORMAL

## 2025-01-16 LAB
ACID FAST MOD KINY STN SPEC: NORMAL
MYCOBACTERIUM SPEC QL CULT: NORMAL

## 2025-01-19 LAB
BACTERIA SPEC AEROBE CULT: NO GROWTH

## 2025-01-24 LAB
BACTERIA SPEC ANAEROBE CULT: NORMAL

## 2025-01-27 ENCOUNTER — OFFICE VISIT (OUTPATIENT)
Dept: ORTHOPEDICS | Facility: CLINIC | Age: 55
End: 2025-01-27
Payer: COMMERCIAL

## 2025-01-27 ENCOUNTER — TELEPHONE (OUTPATIENT)
Dept: ORTHOPEDICS | Facility: CLINIC | Age: 55
End: 2025-01-27

## 2025-01-27 VITALS — WEIGHT: 169.75 LBS | HEIGHT: 64 IN | BODY MASS INDEX: 28.98 KG/M2

## 2025-01-27 DIAGNOSIS — Z98.890 POSTOPERATIVE STATE: Primary | ICD-10-CM

## 2025-01-27 PROCEDURE — 1160F RVW MEDS BY RX/DR IN RCRD: CPT | Mod: CPTII,S$GLB,, | Performed by: STUDENT IN AN ORGANIZED HEALTH CARE EDUCATION/TRAINING PROGRAM

## 2025-01-27 PROCEDURE — 1159F MED LIST DOCD IN RCRD: CPT | Mod: CPTII,S$GLB,, | Performed by: STUDENT IN AN ORGANIZED HEALTH CARE EDUCATION/TRAINING PROGRAM

## 2025-01-27 PROCEDURE — 99999 PR PBB SHADOW E&M-EST. PATIENT-LVL III: CPT | Mod: PBBFAC,,, | Performed by: STUDENT IN AN ORGANIZED HEALTH CARE EDUCATION/TRAINING PROGRAM

## 2025-01-27 PROCEDURE — 99024 POSTOP FOLLOW-UP VISIT: CPT | Mod: S$GLB,,, | Performed by: STUDENT IN AN ORGANIZED HEALTH CARE EDUCATION/TRAINING PROGRAM

## 2025-01-27 NOTE — PROGRESS NOTES
Hand Surgery Clinic Postop Note    Surgery Date: 01/14/2025  Surgery: Right wrist FCR flexor synovectomy, biopsy, culture   Right ring finger MCP synovectomy, biopsy, culture  Right index finger PIP synovectomy, biopsy, culture    Postoperative Course:  54 year old right hand dominant female USPCAMRYN ang presents today for follow up. She is status post synovectomy of right wrist FCR, right ring finger MCP, and right index finger PIP on 01/14/2025, 13 days ago. Biopsies and cultures were taken during surgery. Cultures have not grown any bacterial organisms thus far. Pt states that she does still have intermittent sharp hand pain. Her pain level is a 0/10 today. No other complications. No fevers or chills. No numbness or tingling.    Vital Signs  There were no vitals filed for this visit.    Physical Exam  General - NAD  Resp - nonlabored breathing  CV - RR by RP    Right Upper Extremity:  Well healed surgical incisions at volar right wrist, medial right ring finger MCP, and right dorsal right ring finger MCP. Nylon suture in place. No erythema. No drainage. There is a associated ulnar deviation at the index finger PIPJ joint. Active and passive index finger MCP motion is 0-90 degrees, PIP motion is 0 to 30°, DIP motion is 0-40 degrees. Active wrist flexion to 60° and extension to 20°. Full pronation and supination. The fingers are warm with brisk capillary refill. Sensation is intact in the median, radial, ulnar nerve distributions. Palpable radial pulse.     Imaging  No new imaging obtained today.     Cultures 1/14/25  Right Wrist FCR Synovitic Tissue  Gram stain - No organisms, no WBCs  Aerobic - no growth, final  Anaerobic - no anaerobes isolated, final  Fungal - culture in progress  AFB - no acid fast bacilli on stain, culture in progress    Right Ring Finger MCP Synovitic Tissue  Gram stain - No organisms, no WBCs  Aerobic - no growth, final  Anaerobic - no anaerobes isolated, final  Fungal - culture in  progress  AFB - no acid fast bacilli on stain, culture in progress    Right Index PIP Synovitic Tissue  Gram stain - No organisms, no WBCs  Aerobic - no growth, final  Anaerobic - no anaerobes isolated, final  Fungal - culture in progress  AFB - no acid fast bacilli on stain, culture in progress    Pathology 1/14/25  1. RIGHT WRIST FCR SYNOVITIC TISSUE, ARTHROPLASTY:   Hyperplastic synovial tissue with chronic inflammation, underlying scar and ganglion formation.    Negative for acute inflammation or neoplasia.      2. RIGHT RING FINGER MCP SYNOVITIC TISSUE, ARTHROPLASTY:   Hyperplastic synovial tissue with minimal chronic inflammation and scar.    Negative for acute inflammation or neoplasia.      3. RIGHT INDEX PIP SYNOVITIC TISSUE, ARTHROPLASTY:   Fibroadipose tissue with entrapped hypertrophic nerve fibers (possible neuroma).    Negative for acute inflammation or neoplasia.     Assessment and Plan  54 year old right hand dominant female presents today for follow up. She is 13 days status post synovectomy of right wrist FCR, right ring finger MCP, and right index finger PIP performed on 01/14/2025. Cultures  taken during surgery have not grown any bacterial organisms thus far. Discussed with patient that we will watch the cultures until the 6 week marco a to confirm no growth. I sent a message to Dr. Valera with rheumatology for her to be seen in about a month after we have confirmed no growth on cultures. Sutures removed today. Okay to shower. She will work on range of motion exercises at home. Discussed with patient that if her range of motion is not back to baseline in 1 week to call the clinic and schedule hand therapy. Follow up scheduled for 4 weeks in the clinic. No xrays needed.    ROSSANA TrejoS    Genevieve Garcia MD  Orthopaedic Hand Surgery

## 2025-01-28 LAB
FUNGUS SPEC CULT: NORMAL
FUNGUS SPEC CULT: NORMAL

## 2025-02-03 LAB
FUNGUS SPEC CULT: NORMAL

## 2025-02-18 NOTE — TELEPHONE ENCOUNTER
----- Message from Brinda Morocho sent at 1/12/2024 12:35 PM CST -----  Contact: pt  Pt is calling in regard to needing a form for returning to work filled out.  Please call her to advise if she can just come in and wait for it or what?  Please call her at [phone]  [email or web address]     Patient went into acute hypoxic respiratory failure was a rapid response on 2/12 secondary to pulmonary edema.   Was given IV Lasix and started on BiPAP   Echocardiogram on 2/13 revealed LVEF 15%, akinetic apical myocardium consistent with Takotsubo cardiomyopathy   Repeat with improved EF see below   Transitioned to PO lasix on 2/15/25- continue 40 mg daily   Continue to wean oxygen as tolerated-weaned to 3 L nasal cannula today  Wean as tolerated   Will likely require home O2 study upon discharge -in next 24 to 48 hours

## 2025-02-27 ENCOUNTER — OFFICE VISIT (OUTPATIENT)
Dept: ORTHOPEDICS | Facility: CLINIC | Age: 55
End: 2025-02-27
Payer: COMMERCIAL

## 2025-02-27 VITALS — BODY MASS INDEX: 28.98 KG/M2 | WEIGHT: 169.75 LBS | HEIGHT: 64 IN

## 2025-02-27 DIAGNOSIS — M18.11 PRIMARY OSTEOARTHRITIS OF FIRST CARPOMETACARPAL JOINT OF RIGHT HAND: Primary | ICD-10-CM

## 2025-02-27 DIAGNOSIS — Z98.890 POSTOPERATIVE STATE: ICD-10-CM

## 2025-02-27 DIAGNOSIS — S61.111A LACERATION OF RIGHT THUMB WITHOUT FOREIGN BODY WITH DAMAGE TO NAIL, INITIAL ENCOUNTER: ICD-10-CM

## 2025-02-27 PROCEDURE — 97760 ORTHOTIC MGMT&TRAING 1ST ENC: CPT | Mod: S$GLB,,, | Performed by: STUDENT IN AN ORGANIZED HEALTH CARE EDUCATION/TRAINING PROGRAM

## 2025-02-27 PROCEDURE — 99999 PR PBB SHADOW E&M-EST. PATIENT-LVL III: CPT | Mod: PBBFAC,,, | Performed by: STUDENT IN AN ORGANIZED HEALTH CARE EDUCATION/TRAINING PROGRAM

## 2025-02-27 PROCEDURE — 1159F MED LIST DOCD IN RCRD: CPT | Mod: CPTII,S$GLB,, | Performed by: STUDENT IN AN ORGANIZED HEALTH CARE EDUCATION/TRAINING PROGRAM

## 2025-02-27 PROCEDURE — 99214 OFFICE O/P EST MOD 30 MIN: CPT | Mod: 24,S$GLB,, | Performed by: STUDENT IN AN ORGANIZED HEALTH CARE EDUCATION/TRAINING PROGRAM

## 2025-02-27 PROCEDURE — 3008F BODY MASS INDEX DOCD: CPT | Mod: CPTII,S$GLB,, | Performed by: STUDENT IN AN ORGANIZED HEALTH CARE EDUCATION/TRAINING PROGRAM

## 2025-02-27 PROCEDURE — 1160F RVW MEDS BY RX/DR IN RCRD: CPT | Mod: CPTII,S$GLB,, | Performed by: STUDENT IN AN ORGANIZED HEALTH CARE EDUCATION/TRAINING PROGRAM

## 2025-02-27 RX ORDER — MELOXICAM 7.5 MG/1
7.5 TABLET ORAL DAILY
Qty: 30 TABLET | Refills: 2 | Status: SHIPPED | OUTPATIENT
Start: 2025-02-27 | End: 2025-05-28

## 2025-02-27 NOTE — PROGRESS NOTES
Hand Surgery Clinic Postop Note    Surgery Date: 01/14/2025  Surgery: Right wrist FCR flexor synovectomy, biopsy, culture   Right ring finger MCP synovectomy, biopsy, culture  Right index finger PIP synovectomy, biopsy, culture    Postoperative Course:  54 year old right hand dominant female CATALINA ang presents today for follow up. She is status post synovectomy of right wrist FCR, right ring finger MCP, and right index finger PIP on 01/14/2025, 6 weeks and 2 days ago. Biopsies and cultures were taken during surgery. Cultures have not grown any bacterial organisms thus far. Her pain level is a 0/10 today. No other complications. No fevers or chills. No numbness or tingling.     She has 2 issues which she would like to discuss today in addition.  First, she has pain at the base of the right thumb which is worse with activity.  This has taken place for several weeks to months.  She has never been evaluated or treated for this before.  Additionally, last week she sustained a laceration to the dorsal aspect of her thumb at the base of the nail.  She has minimal pain in his area now but would like it to be looked at.    Vital Signs  There were no vitals filed for this visit.    Physical Exam  General - NAD  Resp - nonlabored breathing  CV - RR by RP    Right Upper Extremity:  Well healed surgical incisions at volar right wrist, medial right ring finger MCP, and right dorsal right ring finger MCP. Nylon suture in place. No erythema. No drainage. There is a associated ulnar deviation at the index finger PIPJ joint. Active and passive index finger MCP motion is 0-90 degrees, PIP motion is 0 to 30°, DIP motion is 0-40 degrees. Active wrist flexion to 60° and extension to 20°. Full pronation and supination. The fingers are warm with brisk capillary refill. Sensation is intact in the median, radial, ulnar nerve distributions.   There is some persistent swelling at the volar radial wrist  at patient's area of synovitis.   Patient has tenderness over the thumb CMC joint.  Positive thumb adduction and extension test.  No crepitus with CMC grind.  Additionally there is a laceration through the proximal nail fold which is exposing the base of the nail.   There is displacement of the nail fold. No nail deformity.   Patient has full active flexion and extension at the thumb IP joint. Palpable radial pulse.     Imaging  No new imaging obtained today.     Cultures 1/14/25  Right Wrist FCR Synovitic Tissue  Gram stain - No organisms, no WBCs  Aerobic - no growth, final  Anaerobic - no anaerobes isolated, final  Fungal - no fungus isolated after 4 weeks  AFB - no acid fast bacilli on stain, culture in progress    Right Ring Finger MCP Synovitic Tissue  Gram stain - No organisms, no WBCs  Aerobic - no growth, final  Anaerobic - no anaerobes isolated, final  Fungal - no fungus isolated after 4 weeks  AFB - no acid fast bacilli on stain, culture in progress    Right Index PIP Synovitic Tissue  Gram stain - No organisms, no WBCs  Aerobic - no growth, final  Anaerobic - no anaerobes isolated, final  Fungal - no fungus isolated after 4 weeks  AFB - no acid fast bacilli on stain, culture in progress    Pathology 1/14/25  1. RIGHT WRIST FCR SYNOVITIC TISSUE, ARTHROPLASTY:   Hyperplastic synovial tissue with chronic inflammation, underlying scar and ganglion formation.    Negative for acute inflammation or neoplasia.      2. RIGHT RING FINGER MCP SYNOVITIC TISSUE, ARTHROPLASTY:   Hyperplastic synovial tissue with minimal chronic inflammation and scar.    Negative for acute inflammation or neoplasia.      3. RIGHT INDEX PIP SYNOVITIC TISSUE, ARTHROPLASTY:   Fibroadipose tissue with entrapped hypertrophic nerve fibers (possible neuroma).    Negative for acute inflammation or neoplasia.     Assessment and Plan  54 year old right hand dominant female presents today for follow up. She is 6 weeks and 2 days status post synovectomy of right wrist FCR, right  ring finger MCP, and right index finger PIP performed on 01/14/2025. Cultures  taken during surgery have not grown any bacterial organisms thus far.   The only culture result which is still pending is AFB which should finalize any day now. She has an appointment with Dr. Valera in a couple weeks to discuss treatment options once we have confirmed that there is no infection/cultures have finalized.       Patient has pain at the right thumb CMC joint today and possible early arthritis.  I have recommended a steroid injection to see if this will improve or resolve her symptoms.  She would like to hold off on this for now.  Instead, I provided her with a handout to read further on this information.  I fitted for her for a CMC controller plus brace to be worn as needed during functional activities. At least 10 minutes were spent sizing, fitting, and educating for durable medical equipment application today.  This service was performed under the direction of Genevieve Garcia MD.  CPT 54527.   Lastly, I sent a script for meloxicam to patient's pharmacy; take with food.      Patient additionally has a laceration to the proximal nail fold  Of the right thumb with the exposure of the base of the nail. Discussed that this should eventually heal without complication but her nail may fall off and regrow at some point in the next 6 months.      Follow up in clinic as needed.    Genevieve Garcia MD  Orthopaedic Hand Surgery

## 2025-03-13 ENCOUNTER — PATIENT MESSAGE (OUTPATIENT)
Dept: RHEUMATOLOGY | Facility: CLINIC | Age: 55
End: 2025-03-13
Payer: COMMERCIAL

## 2025-05-06 ENCOUNTER — TELEPHONE (OUTPATIENT)
Dept: RHEUMATOLOGY | Facility: CLINIC | Age: 55
End: 2025-05-06
Payer: COMMERCIAL

## 2025-05-06 NOTE — TELEPHONE ENCOUNTER
----- Message from Birdie sent at 5/6/2025  1:11 PM CDT -----  Contact: self  Type: Appointment AccessWho called: Dana Quiros for visit: [email or web address] does the patient need to be seen?: next availableNext Available Appointment: blockedWould the patient rather a call back or a response via [facility]?: call [facility] Call Back Number: [phone] Information: n/a

## 2025-05-08 ENCOUNTER — OFFICE VISIT (OUTPATIENT)
Dept: RHEUMATOLOGY | Facility: CLINIC | Age: 55
End: 2025-05-08
Payer: COMMERCIAL

## 2025-05-08 ENCOUNTER — LAB VISIT (OUTPATIENT)
Dept: LAB | Facility: HOSPITAL | Age: 55
End: 2025-05-08
Attending: STUDENT IN AN ORGANIZED HEALTH CARE EDUCATION/TRAINING PROGRAM
Payer: COMMERCIAL

## 2025-05-08 VITALS
DIASTOLIC BLOOD PRESSURE: 109 MMHG | SYSTOLIC BLOOD PRESSURE: 195 MMHG | HEIGHT: 64 IN | HEART RATE: 65 BPM | BODY MASS INDEX: 30.86 KG/M2 | WEIGHT: 180.75 LBS

## 2025-05-08 DIAGNOSIS — M06.09 RHEUMATOID ARTHRITIS OF MULTIPLE SITES WITH NEGATIVE RHEUMATOID FACTOR: ICD-10-CM

## 2025-05-08 DIAGNOSIS — M06.09 RHEUMATOID ARTHRITIS OF MULTIPLE SITES WITH NEGATIVE RHEUMATOID FACTOR: Primary | ICD-10-CM

## 2025-05-08 LAB
ABSOLUTE EOSINOPHIL (OHS): 0.12 K/UL
ABSOLUTE MONOCYTE (OHS): 0.42 K/UL (ref 0.3–1)
ABSOLUTE NEUTROPHIL COUNT (OHS): 2.93 K/UL (ref 1.8–7.7)
ALBUMIN SERPL BCP-MCNC: 3.2 G/DL (ref 3.5–5.2)
ALP SERPL-CCNC: 107 UNIT/L (ref 40–150)
ALT SERPL W/O P-5'-P-CCNC: 8 UNIT/L (ref 10–44)
ANION GAP (OHS): 5 MMOL/L (ref 8–16)
AST SERPL-CCNC: 14 UNIT/L (ref 11–45)
BASOPHILS # BLD AUTO: 0.06 K/UL
BASOPHILS NFR BLD AUTO: 1.1 %
BILIRUB SERPL-MCNC: 0.2 MG/DL (ref 0.1–1)
BUN SERPL-MCNC: 19 MG/DL (ref 6–20)
CALCIUM SERPL-MCNC: 8.8 MG/DL (ref 8.7–10.5)
CHLORIDE SERPL-SCNC: 108 MMOL/L (ref 95–110)
CO2 SERPL-SCNC: 27 MMOL/L (ref 23–29)
CREAT SERPL-MCNC: 1 MG/DL (ref 0.5–1.4)
CRP SERPL-MCNC: 3.2 MG/L
ERYTHROCYTE [DISTWIDTH] IN BLOOD BY AUTOMATED COUNT: 14.1 % (ref 11.5–14.5)
ERYTHROCYTE [SEDIMENTATION RATE] IN BLOOD: 22 MM/HR
GFR SERPLBLD CREATININE-BSD FMLA CKD-EPI: >60 ML/MIN/1.73/M2
GLUCOSE SERPL-MCNC: 90 MG/DL (ref 70–110)
HBV CORE AB SERPL QL IA: NORMAL
HBV SURFACE AG SERPL QL IA: NORMAL
HCT VFR BLD AUTO: 37.7 % (ref 37–48.5)
HGB BLD-MCNC: 11.8 GM/DL (ref 12–16)
IMM GRANULOCYTES # BLD AUTO: 0.02 K/UL (ref 0–0.04)
IMM GRANULOCYTES NFR BLD AUTO: 0.4 % (ref 0–0.5)
LYMPHOCYTES # BLD AUTO: 1.88 K/UL (ref 1–4.8)
MCH RBC QN AUTO: 26.3 PG (ref 27–31)
MCHC RBC AUTO-ENTMCNC: 31.3 G/DL (ref 32–36)
MCV RBC AUTO: 84 FL (ref 82–98)
NUCLEATED RBC (/100WBC) (OHS): 0 /100 WBC
PLATELET # BLD AUTO: 340 K/UL (ref 150–450)
PMV BLD AUTO: 10.6 FL (ref 9.2–12.9)
POTASSIUM SERPL-SCNC: 4.2 MMOL/L (ref 3.5–5.1)
PROT SERPL-MCNC: 6.9 GM/DL (ref 6–8.4)
RBC # BLD AUTO: 4.49 M/UL (ref 4–5.4)
RELATIVE EOSINOPHIL (OHS): 2.2 %
RELATIVE LYMPHOCYTE (OHS): 34.6 % (ref 18–48)
RELATIVE MONOCYTE (OHS): 7.7 % (ref 4–15)
RELATIVE NEUTROPHIL (OHS): 54 % (ref 38–73)
SODIUM SERPL-SCNC: 140 MMOL/L (ref 136–145)
WBC # BLD AUTO: 5.43 K/UL (ref 3.9–12.7)

## 2025-05-08 PROCEDURE — 85025 COMPLETE CBC W/AUTO DIFF WBC: CPT

## 2025-05-08 PROCEDURE — 86704 HEP B CORE ANTIBODY TOTAL: CPT

## 2025-05-08 PROCEDURE — 80053 COMPREHEN METABOLIC PANEL: CPT

## 2025-05-08 PROCEDURE — 3080F DIAST BP >= 90 MM HG: CPT | Mod: CPTII,S$GLB,, | Performed by: STUDENT IN AN ORGANIZED HEALTH CARE EDUCATION/TRAINING PROGRAM

## 2025-05-08 PROCEDURE — 86140 C-REACTIVE PROTEIN: CPT

## 2025-05-08 PROCEDURE — 1160F RVW MEDS BY RX/DR IN RCRD: CPT | Mod: CPTII,S$GLB,, | Performed by: STUDENT IN AN ORGANIZED HEALTH CARE EDUCATION/TRAINING PROGRAM

## 2025-05-08 PROCEDURE — 3008F BODY MASS INDEX DOCD: CPT | Mod: CPTII,S$GLB,, | Performed by: STUDENT IN AN ORGANIZED HEALTH CARE EDUCATION/TRAINING PROGRAM

## 2025-05-08 PROCEDURE — 99215 OFFICE O/P EST HI 40 MIN: CPT | Mod: S$GLB,,, | Performed by: STUDENT IN AN ORGANIZED HEALTH CARE EDUCATION/TRAINING PROGRAM

## 2025-05-08 PROCEDURE — 86480 TB TEST CELL IMMUN MEASURE: CPT

## 2025-05-08 PROCEDURE — 99999 PR PBB SHADOW E&M-EST. PATIENT-LVL IV: CPT | Mod: PBBFAC,,, | Performed by: STUDENT IN AN ORGANIZED HEALTH CARE EDUCATION/TRAINING PROGRAM

## 2025-05-08 PROCEDURE — 1159F MED LIST DOCD IN RCRD: CPT | Mod: CPTII,S$GLB,, | Performed by: STUDENT IN AN ORGANIZED HEALTH CARE EDUCATION/TRAINING PROGRAM

## 2025-05-08 PROCEDURE — 83521 IG LIGHT CHAINS FREE EACH: CPT

## 2025-05-08 PROCEDURE — 85651 RBC SED RATE NONAUTOMATED: CPT

## 2025-05-08 PROCEDURE — 36415 COLL VENOUS BLD VENIPUNCTURE: CPT

## 2025-05-08 PROCEDURE — 87340 HEPATITIS B SURFACE AG IA: CPT

## 2025-05-08 PROCEDURE — 3077F SYST BP >= 140 MM HG: CPT | Mod: CPTII,S$GLB,, | Performed by: STUDENT IN AN ORGANIZED HEALTH CARE EDUCATION/TRAINING PROGRAM

## 2025-05-08 RX ORDER — ADALIMUMAB 40MG/0.4ML
40 KIT SUBCUTANEOUS
Qty: 2 PEN | Refills: 11 | Status: SHIPPED | OUTPATIENT
Start: 2025-05-08 | End: 2026-05-08

## 2025-05-08 NOTE — PATIENT INSTRUCTIONS
Stop colchicine.  Take Arthritis strength extended release Tylenol 650 mg 1 tablet 3 times daily as needed for pain.  Start turmeric supplements 1000 mg 2 times daily for anti-inflammatory benefit.  If no improvement in joint pain, take a meloxicam.  Start Humira injection every 2 weeks.    Make an appointment for 4 months with a Rheumatologist at one of the following locations:  [facility].  [facility].  [facility].

## 2025-05-08 NOTE — PROGRESS NOTES
"Subjective:      Patient ID: Dana Winter is a 55 y.o. female.    Chief Complaint: inflammatory arthritis.    HPI:   Patient presents for Rheumatology follow up for inflammatory arthritis affecting the right hand. In summary, symptoms started 11/2023 after a right hand injury due to dropping a heavy box of mail where she works as a [employer] . Following this, she had profuse swelling and pain in the right hand. Around the same time, she was also diagnosed with pneumonia with interstitial markings at both lung bases (treated with abx). Only other symptom was unexplained weight loss of 22 lbs in 5 months. The hand swelling had the clinical appearance of a gout attack so it was treated with NSAIDs, prednisone, and colchicine, but there was minimal improvement. Therefore infection was considered. MRI right hand w/wo contrast in 12/2023 showed "exuberant joint effusion fourth MP joint, nonspecific.  Inflammatory arthropathy possible.  Septic arthritis thought less likely. Extensor and flexor tenosynovitis with exuberant tenosynovitis of the flexor tendons of the index and ring fingers. Additional exuberant soft tissue edema throughout the index and ring fingers.  No focal fluid collections to suggest abscess." This MRI and lack of other joint involvement or systemic symptoms was not typical for inflammatory arthritis like RA and I was reluctant to try immunosuppression before excluding infection. She was given a long tapering course of steroids and referred to Hand Surgery to get a synovial sample. However by the time she saw Hand Surgeon, Dr. Garcia, the right hand swelling had improved. There was remaining swelling of the right 2nd PIP joint. X-ray of the hands showed erosive destruction of the right 2nd PIP joint with surrounding soft tissue swelling. To a lesser degree right 2nd DIP joint was affected and right 4th MCP joint had significant joint space narrowing. She therefore underwent synovectomy of right " "wrist FCR, right ring finger MCP, and right index finger PIP on 01/14/2025. Synovial tissue samples showed chronic inflammation and no growth after 6 weeks of aerobic and anaerobic bacteria, fungi, and AFB culture.     Today she reports having healed well from surgery. She can make 90% fist with the right hand. There is no swelling of the right hand/wrist. Also notes that a shelf fell on right hand at work a couple months ago, damaging the thumbnail. She reports pain in the hand at the end of the work day. Also reports bilateral knees cracking and hurting when she squats. Denies joint swelling, significant stiffness, skin rashes, oral ulcers, patchy alopecia, sicca symptoms, cardiopulmonary symptoms, eye inflammation, IBD, fevers, weight loss, Raynaud's. Rheumatology review of systems is otherwise negative.            Initial photo dated 12/4/2023:        After long steroid course dated 8/9/2024:            Social Hx: never smoker, no drug use, no alcohol  Family Hx: No family history of autoimmune disease    Objective:   BP (!) 195/109 (BP Location: Right forearm, Patient Position: Sitting)   Pulse 65   Ht 5' 4" (1.626 m)   Wt 82 kg (180 lb 12.4 oz)   BMI 31.03 kg/m²   Physical Exam  Constitutional:       General: She is not in acute distress.     Appearance: Normal appearance.   HENT:      Head: Normocephalic and atraumatic.      Mouth/Throat:      Mouth: Mucous membranes are moist.      Pharynx: Oropharynx is clear.   Cardiovascular:      Rate and Rhythm: Normal rate and regular rhythm.   Pulmonary:      Effort: Pulmonary effort is normal.      Breath sounds: Normal breath sounds.   Abdominal:      Palpations: Abdomen is soft.      Tenderness: There is no abdominal tenderness.   Musculoskeletal:         General: No swelling or tenderness.      Cervical back: Normal range of motion. No tenderness.      Comments: No synovitis, tenosynovitis, or effusions in the right hand. No tenderness. Can make 90% fist with " the right hand. Destruction of right thumbnail after recent injury. Left hand exam is unremarkable. No tenderness or swelling in the knees. There is crepitus in the knees. Remainder of joint exam is unremarkable.   Skin:     General: Skin is warm and dry.   Neurological:      Mental Status: She is alert and oriented to person, place, and time. Mental status is at baseline.           Assessment and Plan:     Problem List Items Addressed This Visit    None  Visit Diagnoses         Rheumatoid arthritis of multiple sites with negative rheumatoid factor    -  Primary    Relevant Medications    adalimumab (HUMIRA,CF, PEN) 40 mg/0.4 mL PnKt    Other Relevant Orders    CBC Auto Differential    Comprehensive Metabolic Panel    C-Reactive Protein    Sedimentation rate (Completed)    Hepatitis B Core Antibody, Total    Hepatitis B Surface Antigen    Quantiferon Gold TB    Immunoglobulin Free LT Chains Blood                Patient presents for Rheumatology follow up for inflammatory arthritis affecting the right hand. Seemed to start after trauma.    Negative MAURI, RF, CCP.  In 12/2023 during the initial episode, ESR >120, , platelets >1000.  SPEP, NEYMAR without monoclonal peaks. FLC shows high kappa with high K:L.  Uric acid was 11.9 in 12/2023 at time of MARCO, repeat was 4.9. Has not been on any ULT.     XR hands 12/2023 reviewed by me:  Uniform joint space narrowing of right 2nd PIP with a marginal rat-bite appearing erosion as well as uniform joint space narrowing of 4th MCP.    MRI hand w/wo contrast 12/21/2023:  1.    Negative for evidence of os myelitis.  Scattered degenerative changes noted.  2.    Exuberant joint effusion fourth MP joint, nonspecific.  Inflammatory arthropathy possible.  Septic arthritis thought less likely.  3.    Extensor and flexor tenosynovitis with exuberant tenosynovitis of the flexor tendons of the index and ring fingers.  Additional exuberant soft tissue edema throughout the index and ring  fingers.  No focal fluid collections to suggest abscess.          XR hands 10/2024 reviewed by me:  Erosive destruction of the right 2nd PIP joint with surrounding soft tissue swelling. To a lesser degree right 2nd DIP erosive joint destruction. Right 4th MCP significant joint space narrowing.    MRI right hand w/wo contrast 12/18/2024:  1.  Multiarticular inflammatory arthropathy including index finger PIP, index finger DIP, and ring finger MCP distribution, detailed above.  Scattered degenerative carpal sequela.  2.  Flexor carpi radialis, palmaris longus tenosynovitis with mild tendon sheath fluid distention.  3.  Mild soft tissue swelling volar radial wrist.  4.  Negative for abscess.  Negative for osteomyelitis.        ___PATHOLOGY REPORT___surgery 1/14/2025.  1. RIGHT WRIST FCR SYNOVITIC TISSUE, ARTHROPLASTY:   Hyperplastic synovial tissue with chronic inflammation, underlying scar and ganglion formation.    Negative for acute inflammation or neoplasia.      2. RIGHT RING FINGER MCP SYNOVITIC TISSUE, ARTHROPLASTY:   Hyperplastic synovial tissue with minimal chronic inflammation and scar.    Negative for acute inflammation or neoplasia.      3. RIGHT INDEX PIP SYNOVITIC TISSUE, ARTHROPLASTY:   Fibroadipose tissue with entrapped hypertrophic nerve fibers (possible neuroma).    Negative for acute inflammation or neoplasia.         Impression:  Inflammatory arthritis in a goutlike or psoriatic arthritis picture with erosive disease and involvement of wrist, MCP, PIP, and DIP joints. Might have had trauma onset. Surgical specimens negative for infections including AFB cultures at 6 weeks. Improved significantly after synovectomies as above. No longer having swelling but has pain at the end of the work day. Will treat as an inflammatory arthritis like psoriatic arthritis.      Plan:  Start Humira  Take Arthritis strength extended release Tylenol 650 mg 1 tablet 3 times daily as needed for pain.  Start turmeric  supplements 1000 mg 2 times daily for anti-inflammatory benefit.  If no improvement in joint pain, take a meloxicam.          I spent a total of 60 minutes on the day of the visit.  This includes face to face time and non-face to face time preparing to see the patient (eg, review of tests), obtaining and/or reviewing separately obtained history, documenting clinical information in the electronic or other health record, independently interpreting results and communicating results to the patient/family/caregiver, or care coordinator.

## 2025-05-09 LAB
KAPPA LC FREE SER-MCNC: 2.27 MG/L (ref 0.26–1.65)
KAPPA LC FREE/LAMBDA FREE SER: 5.23 MG/DL (ref 0.33–1.94)
LAMBDA LC FREE SERPL-MCNC: 2.3 MG/DL (ref 0.57–2.63)
MITOGEN MINUS NIL (OHS): 9.97
NIL TB SYNCED (OHS): 0.03
QUANTIFERON GOLD INTERP (OHS): NEGATIVE
TB1 AG MINUS NIL (OHS): 0.04
TB2 AG MINUS NIL (OHS): 0.03

## 2025-05-12 ENCOUNTER — RESULTS FOLLOW-UP (OUTPATIENT)
Dept: RHEUMATOLOGY | Facility: CLINIC | Age: 55
End: 2025-05-12

## 2025-05-12 DIAGNOSIS — R76.8 FREE IMMUNOGLOBULIN LIGHT CHAIN ABOVE REFERENCE RANGE: Primary | ICD-10-CM

## 2025-05-13 ENCOUNTER — PATIENT MESSAGE (OUTPATIENT)
Dept: HEMATOLOGY/ONCOLOGY | Facility: CLINIC | Age: 55
End: 2025-05-13
Payer: COMMERCIAL

## 2025-05-23 ENCOUNTER — TELEPHONE (OUTPATIENT)
Dept: HEMATOLOGY/ONCOLOGY | Facility: CLINIC | Age: 55
End: 2025-05-23
Payer: COMMERCIAL

## 2025-05-27 ENCOUNTER — TELEPHONE (OUTPATIENT)
Dept: INTERNAL MEDICINE | Facility: CLINIC | Age: 55
End: 2025-05-27
Payer: COMMERCIAL

## 2025-05-30 ENCOUNTER — OFFICE VISIT (OUTPATIENT)
Dept: HEMATOLOGY/ONCOLOGY | Facility: CLINIC | Age: 55
End: 2025-05-30
Payer: COMMERCIAL

## 2025-05-30 ENCOUNTER — LAB VISIT (OUTPATIENT)
Dept: LAB | Facility: HOSPITAL | Age: 55
End: 2025-05-30
Attending: FAMILY MEDICINE
Payer: COMMERCIAL

## 2025-05-30 VITALS
WEIGHT: 183.44 LBS | DIASTOLIC BLOOD PRESSURE: 97 MMHG | TEMPERATURE: 98 F | SYSTOLIC BLOOD PRESSURE: 155 MMHG | HEIGHT: 64 IN | BODY MASS INDEX: 31.32 KG/M2 | HEART RATE: 64 BPM

## 2025-05-30 DIAGNOSIS — Z12.31 ENCOUNTER FOR SCREENING MAMMOGRAM FOR BREAST CANCER: ICD-10-CM

## 2025-05-30 DIAGNOSIS — D50.0 IRON DEFICIENCY ANEMIA DUE TO CHRONIC BLOOD LOSS: ICD-10-CM

## 2025-05-30 DIAGNOSIS — R76.8 ELEVATED SERUM IMMUNOGLOBULIN FREE LIGHT CHAIN LEVEL: ICD-10-CM

## 2025-05-30 DIAGNOSIS — Z86.2 HISTORY OF THROMBOCYTOSIS: ICD-10-CM

## 2025-05-30 DIAGNOSIS — R76.8 ELEVATED SERUM IMMUNOGLOBULIN FREE LIGHT CHAINS: ICD-10-CM

## 2025-05-30 DIAGNOSIS — R76.8 ELEVATED SERUM IMMUNOGLOBULIN FREE LIGHT CHAINS: Primary | ICD-10-CM

## 2025-05-30 DIAGNOSIS — M06.09 RHEUMATOID ARTHRITIS OF MULTIPLE SITES WITH NEGATIVE RHEUMATOID FACTOR: ICD-10-CM

## 2025-05-30 DIAGNOSIS — D64.9 ANEMIA, UNSPECIFIED TYPE: ICD-10-CM

## 2025-05-30 LAB
ABSOLUTE EOSINOPHIL (OHS): 0.1 K/UL
ABSOLUTE MONOCYTE (OHS): 0.51 K/UL (ref 0.3–1)
ABSOLUTE NEUTROPHIL COUNT (OHS): 4.1 K/UL (ref 1.8–7.7)
ALBUMIN SERPL BCP-MCNC: 3.2 G/DL (ref 3.5–5.2)
ALP SERPL-CCNC: 101 UNIT/L (ref 40–150)
ALT SERPL W/O P-5'-P-CCNC: 10 UNIT/L (ref 10–44)
ANION GAP (OHS): 7 MMOL/L (ref 8–16)
AST SERPL-CCNC: 15 UNIT/L (ref 11–45)
BASOPHILS # BLD AUTO: 0.05 K/UL
BASOPHILS NFR BLD AUTO: 0.8 %
BETA 2 MICROGLOBILIN (OHS): 2.6 UG/ML (ref 0–2.5)
BILIRUB SERPL-MCNC: 0.4 MG/DL (ref 0.1–1)
BUN SERPL-MCNC: 14 MG/DL (ref 6–20)
CALCIUM SERPL-MCNC: 8.7 MG/DL (ref 8.7–10.5)
CHLORIDE SERPL-SCNC: 109 MMOL/L (ref 95–110)
CO2 SERPL-SCNC: 24 MMOL/L (ref 23–29)
CREAT SERPL-MCNC: 1 MG/DL (ref 0.5–1.4)
ERYTHROCYTE [DISTWIDTH] IN BLOOD BY AUTOMATED COUNT: 14.4 % (ref 11.5–14.5)
FERRITIN SERPL-MCNC: 21 NG/ML (ref 20–300)
GFR SERPLBLD CREATININE-BSD FMLA CKD-EPI: >60 ML/MIN/1.73/M2
GLUCOSE SERPL-MCNC: 89 MG/DL (ref 70–110)
HCT VFR BLD AUTO: 37.2 % (ref 37–48.5)
HGB BLD-MCNC: 12.1 GM/DL (ref 12–16)
IGA SERPL-MCNC: 307 MG/DL (ref 40–350)
IGG SERPL-MCNC: 1395 MG/DL (ref 650–1600)
IGM SERPL-MCNC: 154 MG/DL (ref 50–300)
IMM GRANULOCYTES # BLD AUTO: 0.01 K/UL (ref 0–0.04)
IMM GRANULOCYTES NFR BLD AUTO: 0.2 % (ref 0–0.5)
IRON SATN MFR SERPL: 16 % (ref 20–50)
IRON SERPL-MCNC: 68 UG/DL (ref 30–160)
LYMPHOCYTES # BLD AUTO: 1.67 K/UL (ref 1–4.8)
MCH RBC QN AUTO: 27.3 PG (ref 27–31)
MCHC RBC AUTO-ENTMCNC: 32.5 G/DL (ref 32–36)
MCV RBC AUTO: 84 FL (ref 82–98)
NUCLEATED RBC (/100WBC) (OHS): 0 /100 WBC
PLATELET # BLD AUTO: 321 K/UL (ref 150–450)
PMV BLD AUTO: 9.4 FL (ref 9.2–12.9)
POTASSIUM SERPL-SCNC: 3.9 MMOL/L (ref 3.5–5.1)
PROT SERPL-MCNC: 7.2 GM/DL (ref 6–8.4)
RBC # BLD AUTO: 4.43 M/UL (ref 4–5.4)
RELATIVE EOSINOPHIL (OHS): 1.6 %
RELATIVE LYMPHOCYTE (OHS): 25.9 % (ref 18–48)
RELATIVE MONOCYTE (OHS): 7.9 % (ref 4–15)
RELATIVE NEUTROPHIL (OHS): 63.6 % (ref 38–73)
SODIUM SERPL-SCNC: 140 MMOL/L (ref 136–145)
TIBC SERPL-MCNC: 420 UG/DL (ref 250–450)
TRANSFERRIN SERPL-MCNC: 284 MG/DL (ref 200–375)
WBC # BLD AUTO: 6.44 K/UL (ref 3.9–12.7)

## 2025-05-30 PROCEDURE — 83521 IG LIGHT CHAINS FREE EACH: CPT

## 2025-05-30 PROCEDURE — 36415 COLL VENOUS BLD VENIPUNCTURE: CPT

## 2025-05-30 PROCEDURE — G2211 COMPLEX E/M VISIT ADD ON: HCPCS | Mod: S$GLB,,,

## 2025-05-30 PROCEDURE — 3044F HG A1C LEVEL LT 7.0%: CPT | Mod: CPTII,S$GLB,,

## 2025-05-30 PROCEDURE — 99999 PR PBB SHADOW E&M-EST. PATIENT-LVL III: CPT | Mod: PBBFAC,,,

## 2025-05-30 PROCEDURE — 3080F DIAST BP >= 90 MM HG: CPT | Mod: CPTII,S$GLB,,

## 2025-05-30 PROCEDURE — 3077F SYST BP >= 140 MM HG: CPT | Mod: CPTII,S$GLB,,

## 2025-05-30 PROCEDURE — 3008F BODY MASS INDEX DOCD: CPT | Mod: CPTII,S$GLB,,

## 2025-05-30 PROCEDURE — 1159F MED LIST DOCD IN RCRD: CPT | Mod: CPTII,S$GLB,,

## 2025-05-30 PROCEDURE — 85025 COMPLETE CBC W/AUTO DIFF WBC: CPT

## 2025-05-30 PROCEDURE — 82728 ASSAY OF FERRITIN: CPT

## 2025-05-30 PROCEDURE — 1160F RVW MEDS BY RX/DR IN RCRD: CPT | Mod: CPTII,S$GLB,,

## 2025-05-30 PROCEDURE — 82232 ASSAY OF BETA-2 PROTEIN: CPT

## 2025-05-30 PROCEDURE — 80053 COMPREHEN METABOLIC PANEL: CPT

## 2025-05-30 PROCEDURE — 99215 OFFICE O/P EST HI 40 MIN: CPT | Mod: S$GLB,,,

## 2025-05-30 PROCEDURE — 84165 PROTEIN E-PHORESIS SERUM: CPT

## 2025-05-30 PROCEDURE — 82784 ASSAY IGA/IGD/IGG/IGM EACH: CPT | Mod: 59

## 2025-05-30 PROCEDURE — 86334 IMMUNOFIX E-PHORESIS SERUM: CPT | Mod: 59

## 2025-05-30 PROCEDURE — 83540 ASSAY OF IRON: CPT

## 2025-06-02 LAB
ALBUMIN, SPE (OHS): 3.37 G/DL (ref 3.35–5.55)
ALPHA 1 GLOB (OHS): 0.29 GM/DL (ref 0.17–0.41)
ALPHA 2 GLOB (OHS): 0.66 GM/DL (ref 0.43–0.99)
BETA GLOB (OHS): 0.93 GM/DL (ref 0.5–1.1)
GAMMA GLOBULIN (OHS): 1.25 GM/DL (ref 0.67–1.58)
KAPPA LC FREE SER-MCNC: 1.26 MG/L (ref 0.26–1.65)
KAPPA LC FREE/LAMBDA FREE SER: 2.85 MG/DL (ref 0.33–1.94)
LAMBDA LC FREE SERPL-MCNC: 2.27 MG/DL (ref 0.57–2.63)
PROT SERPL-MCNC: 6.5 GM/DL (ref 6–8.4)

## 2025-06-03 ENCOUNTER — HOSPITAL ENCOUNTER (OUTPATIENT)
Dept: RADIOLOGY | Facility: HOSPITAL | Age: 55
Discharge: HOME | End: 2025-06-03
Payer: COMMERCIAL

## 2025-06-03 ENCOUNTER — OFFICE VISIT (OUTPATIENT)
Dept: INTERNAL MEDICINE | Facility: CLINIC | Age: 55
End: 2025-06-03
Payer: COMMERCIAL

## 2025-06-03 VITALS
BODY MASS INDEX: 31.05 KG/M2 | OXYGEN SATURATION: 100 % | WEIGHT: 181.88 LBS | SYSTOLIC BLOOD PRESSURE: 112 MMHG | RESPIRATION RATE: 18 BRPM | TEMPERATURE: 97 F | HEIGHT: 64 IN | HEART RATE: 84 BPM | DIASTOLIC BLOOD PRESSURE: 80 MMHG

## 2025-06-03 VITALS — HEIGHT: 64 IN | WEIGHT: 183.44 LBS | BODY MASS INDEX: 31.32 KG/M2

## 2025-06-03 DIAGNOSIS — I10 ESSENTIAL HYPERTENSION: Primary | Chronic | ICD-10-CM

## 2025-06-03 DIAGNOSIS — E66.811 OBESITY (BMI 30.0-34.9): ICD-10-CM

## 2025-06-03 DIAGNOSIS — M06.09 RHEUMATOID ARTHRITIS OF MULTIPLE SITES WITH NEGATIVE RHEUMATOID FACTOR: ICD-10-CM

## 2025-06-03 DIAGNOSIS — Z12.31 ENCOUNTER FOR SCREENING MAMMOGRAM FOR BREAST CANCER: ICD-10-CM

## 2025-06-03 DIAGNOSIS — E55.9 VITAMIN D DEFICIENCY: ICD-10-CM

## 2025-06-03 PROBLEM — E66.3 OVERWEIGHT (BMI 25.0-29.9): Chronic | Status: RESOLVED | Noted: 2020-06-30 | Resolved: 2025-06-03

## 2025-06-03 LAB
PATHOLOGIST INTERPRETATION - IFE SERUM (OHS): NORMAL
PATHOLOGIST REVIEW - SPE (OHS): NORMAL

## 2025-06-03 PROCEDURE — G2211 COMPLEX E/M VISIT ADD ON: HCPCS | Mod: S$GLB,,, | Performed by: FAMILY MEDICINE

## 2025-06-03 PROCEDURE — 3074F SYST BP LT 130 MM HG: CPT | Mod: CPTII,S$GLB,, | Performed by: FAMILY MEDICINE

## 2025-06-03 PROCEDURE — 99214 OFFICE O/P EST MOD 30 MIN: CPT | Mod: S$GLB,,, | Performed by: FAMILY MEDICINE

## 2025-06-03 PROCEDURE — 1159F MED LIST DOCD IN RCRD: CPT | Mod: CPTII,S$GLB,, | Performed by: FAMILY MEDICINE

## 2025-06-03 PROCEDURE — 77063 BREAST TOMOSYNTHESIS BI: CPT | Mod: 26,,, | Performed by: STUDENT IN AN ORGANIZED HEALTH CARE EDUCATION/TRAINING PROGRAM

## 2025-06-03 PROCEDURE — 3079F DIAST BP 80-89 MM HG: CPT | Mod: CPTII,S$GLB,, | Performed by: FAMILY MEDICINE

## 2025-06-03 PROCEDURE — 77067 SCR MAMMO BI INCL CAD: CPT | Mod: 26,,, | Performed by: STUDENT IN AN ORGANIZED HEALTH CARE EDUCATION/TRAINING PROGRAM

## 2025-06-03 PROCEDURE — 99999 PR PBB SHADOW E&M-EST. PATIENT-LVL IV: CPT | Mod: PBBFAC,,, | Performed by: FAMILY MEDICINE

## 2025-06-03 PROCEDURE — 77067 SCR MAMMO BI INCL CAD: CPT | Mod: TC

## 2025-06-03 PROCEDURE — 3008F BODY MASS INDEX DOCD: CPT | Mod: CPTII,S$GLB,, | Performed by: FAMILY MEDICINE

## 2025-06-04 ENCOUNTER — RESULTS FOLLOW-UP (OUTPATIENT)
Dept: HEMATOLOGY/ONCOLOGY | Facility: CLINIC | Age: 55
End: 2025-06-04

## 2025-06-05 ENCOUNTER — RESULTS FOLLOW-UP (OUTPATIENT)
Dept: INTERNAL MEDICINE | Facility: CLINIC | Age: 55
End: 2025-06-05

## 2025-06-13 ENCOUNTER — TELEPHONE (OUTPATIENT)
Dept: HEMATOLOGY/ONCOLOGY | Facility: CLINIC | Age: 55
End: 2025-06-13
Payer: COMMERCIAL

## 2025-06-13 PROBLEM — D64.9 ANEMIA: Status: ACTIVE | Noted: 2025-06-13

## 2025-06-13 PROBLEM — R76.8 ELEVATED SERUM IMMUNOGLOBULIN FREE LIGHT CHAIN LEVEL: Status: ACTIVE | Noted: 2025-06-13

## 2025-06-14 NOTE — ASSESSMENT & PLAN NOTE
Lab Results   Component Value Date    HGB 12.1 05/30/2025     WNL  Lab Results   Component Value Date    IRON 68 05/30/2025    TRANSFERRIN 284 05/30/2025    TIBC 420 05/30/2025    LABIRON 16 (L) 05/30/2025     Lab Results   Component Value Date    FERRITIN 21.0 05/30/2025     Continue on oral iron supplement

## 2025-06-14 NOTE — ASSESSMENT & PLAN NOTE
NEYMAR: No monoclonal peaks identified.   SPEP: Normal total protein, normal pattern   FLC-R: 1.26 with kappa elevation    UPEP: No monoclonal peaks identified  Elevated protein noted   Urine NEYMAR in process    Plan at this time for close follow-up and repeat labs one week prior

## 2025-06-14 NOTE — PROGRESS NOTES
"Subjective:      Patient ID: Dana Winter is a 55 y.o. female.    Chief Complaint: Follow-up      HPI:  Ms. Winter is a pleasant 55-year-old female, initially seen by  01/2024, presents today for follow-up of anemia and thrombocytosis. She is followed by rheumatology for inflammatory arithiritis per review of the medical record, "in a goutlike or psoriatic arthritis picture with erosive disease and involvement of wrist, MCP, PIP, and DIP joints" Currently on Humira--pt notes occasional flare-ups. She c/o fatigue today--she states she works a lot sometimes sleep deprived. Denies n/v/d/c, fever,chills,sob,cp, abnormal bleeding, unintentional weight loss, new or worsening bone pain.          Social History[1]    Family History   Problem Relation Name Age of Onset    Hypertension Father      Stroke Father      Heart attack Father         Past Surgical History:   Procedure Laterality Date    APPENDECTOMY      BURSECTOMY,OR FLEXOR TENDON SHEATH SYNOVECTOMY,WRIST,RADICAL Right 1/14/2025    Procedure: BURSECTOMY,OR FLEXOR TENDON SHEATH SYNOVECTOMY,WRIST,RADICAL;  Surgeon: Genevieve Garcia MD;  Location: [facility] OR;  Service: Orthopedics;  Laterality: Right;    COLONOSCOPY N/A 7/23/2021    Procedure: COLONOSCOPY;  Surgeon: Michelle Harry MD;  Location: [facility];  Service: Endoscopy;  Laterality: N/A;    LAPAROSCOPIC APPENDECTOMY      SYNOVECTOMY OF FINGER Right 1/14/2025    Procedure: SYNOVECTOMY, FINGER;  Surgeon: Genevieve Garcia MD;  Location: [facility] OR;  Service: Orthopedics;  Laterality: Right;  [id number] Right wrist FCR flexor synovectomy  [id number] Right ring finger MCP synovectomy  [id number] Right index finger PIP synovectomy    SYNOVECTOMY OF HAND Right 1/14/2025    Procedure: SYNOVECTOMY, HAND;  Surgeon: Genevieve Garcia MD;  Location: [facility] OR;  Service: Orthopedics;  Laterality: Right;    TUBAL LIGATION         Past Medical History:   Diagnosis Date    DJD (degenerative joint disease) of cervical " spine     on PT    Hypertension        Review of Systems   Constitutional:  Positive for fatigue. Negative for activity change, appetite change, chills, diaphoresis, fever and unexpected weight change.   HENT:  Negative for nosebleeds.    Respiratory:  Negative for shortness of breath.    Cardiovascular:  Negative for chest pain.   Gastrointestinal:  Negative for abdominal distention, abdominal pain, anal bleeding, blood in stool, constipation, diarrhea, nausea and vomiting.   Genitourinary:  Negative for difficulty urinating and hematuria.   Musculoskeletal:  Positive for joint swelling (hands). Negative for arthralgias, back pain and myalgias.   Skin:  Negative for rash.   Neurological:  Negative for dizziness, weakness, light-headedness and headaches.   Hematological:  Does not bruise/bleed easily.   Psychiatric/Behavioral:  The patient is not nervous/anxious.        Medication List with Changes/Refills   Current Medications    ACETAMINOPHEN (TYLENOL) 500 MG TABLET    Take 1 tablet (500 mg total) by mouth every 8 (eight) hours as needed for Pain.    ADALIMUMAB (HUMIRA,CF, PEN) 40 MG/0.4 ML PNKT    Inject 0.4 mLs (40 mg total) into the skin every 14 (fourteen) days.    DICLOFENAC SODIUM (VOLTAREN) 1 % GEL    Apply 2 g topically 3 (three) times daily.    IRON-VITAMIN C 100-250 MG, ICAR-C, (ICAR-C) 100-250 MG TAB    Take 1 tablet by mouth once daily.    LOSARTAN-HYDROCHLOROTHIAZIDE 100-25 MG (HYZAAR) 100-25 MG PER TABLET    Take 1 tablet by mouth once daily.    PANTOPRAZOLE (PROTONIX) 40 MG TABLET    Take 1 tablet (40 mg total) by mouth once daily.   Discontinued Medications    ERGOCALCIFEROL (ERGOCALCIFEROL) 50,000 UNIT CAP    Take 1 capsule by mouth once a week        Objective:     Vitals:    05/30/25 1321   BP: (!) 155/97   Pulse: 64   Temp: 97.5 °F (36.4 °C)       Physical Exam  Constitutional:       General: She is not in acute distress.     Appearance: Normal appearance. She is not ill-appearing.   HENT:       Head: Normocephalic and atraumatic.   Eyes:      Conjunctiva/sclera: Conjunctivae normal.   Cardiovascular:      Rate and Rhythm: Normal rate.   Pulmonary:      Effort: Pulmonary effort is normal. No respiratory distress.   Abdominal:      Palpations: There is no hepatomegaly or splenomegaly.   Musculoskeletal:      Right lower leg: No edema.      Left lower leg: No edema.   Skin:     Findings: No rash.   Neurological:      General: No focal deficit present.      Mental Status: She is alert and oriented to person, place, and time.   Psychiatric:         Mood and Affect: Mood normal.         Behavior: Behavior normal.         Thought Content: Thought content normal.         Lab Results   Component Value Date    WBC 6.44 05/30/2025    HGB 12.1 05/30/2025    HCT 37.2 05/30/2025    MCV 84 05/30/2025     05/30/2025       Lab Results   Component Value Date     05/30/2025    K 3.9 05/30/2025     05/30/2025    CO2 24 05/30/2025    BUN 14 05/30/2025    CREATININE 1.0 05/30/2025    CALCIUM 8.7 05/30/2025    ANIONGAP 7 (L) 05/30/2025    ESTGFRAFRICA >60.0 06/07/2021    EGFRNONAA >60.0 06/07/2021     Lab Results   Component Value Date    ALT 10 05/30/2025    AST 15 05/30/2025    ALKPHOS 101 05/30/2025    BILITOT 0.4 05/30/2025       Assessment/Plan:     Problem List Items Addressed This Visit       Iron deficiency anemia due to chronic blood loss    Lab Results   Component Value Date    HGB 12.1 05/30/2025     WNL  Lab Results   Component Value Date    IRON 68 05/30/2025    TRANSFERRIN 284 05/30/2025    TIBC 420 05/30/2025    LABIRON 16 (L) 05/30/2025     Lab Results   Component Value Date    FERRITIN 21.0 05/30/2025     Continue on oral iron supplement           Relevant Orders    CBC Auto Differential (Completed)    Comprehensive Metabolic Panel (Completed)    Ferritin (Completed)    Iron and TIBC (Completed)    Protein Electrophoresis, Serum (Completed)    Immunofixation, Serum (Completed)     Immunoglobulin Free LT Chains Blood (Completed)    Beta 2 Microglobulin, Serum (Completed)    Immunofixation, 24 hour Urine    Immunoglobulins (IgG, IgA, IgM) Quantitative (Completed)    Protein electrophoresis, timed urine (Completed)    History of thrombocytosis    Lab Results   Component Value Date     05/30/2025     Most recent PLT count WNL         Relevant Orders    CBC Auto Differential (Completed)    Comprehensive Metabolic Panel (Completed)    Ferritin (Completed)    Iron and TIBC (Completed)    Protein Electrophoresis, Serum (Completed)    Immunofixation, Serum (Completed)    Immunoglobulin Free LT Chains Blood (Completed)    Beta 2 Microglobulin, Serum (Completed)    Immunofixation, 24 hour Urine    Immunoglobulins (IgG, IgA, IgM) Quantitative (Completed)    Protein electrophoresis, timed urine (Completed)    Rheumatoid arthritis of multiple sites with negative rheumatoid factor    Continues regular follow-up with rheumatology on Humira         Anemia    Lab Results   Component Value Date    HGB 12.1 05/30/2025     Hgb WNL         Relevant Orders    CBC Auto Differential (Completed)    Comprehensive Metabolic Panel (Completed)    Ferritin (Completed)    Iron and TIBC (Completed)    Protein Electrophoresis, Serum (Completed)    Immunofixation, Serum (Completed)    Immunoglobulin Free LT Chains Blood (Completed)    Beta 2 Microglobulin, Serum (Completed)    Immunofixation, 24 hour Urine    Immunoglobulins (IgG, IgA, IgM) Quantitative (Completed)    Protein electrophoresis, timed urine (Completed)    Elevated serum immunoglobulin free light chain level    NEYMAR: No monoclonal peaks identified.   SPEP: Normal total protein, normal pattern   FLC-R: 1.26 with kappa elevation    UPEP: No monoclonal peaks identified  Elevated protein noted   Urine NEYMAR in process    Plan at this time for close follow-up and repeat labs one week prior             Other Visit Diagnoses         Elevated serum immunoglobulin free  light chains    -  Primary    Relevant Orders    CBC Auto Differential (Completed)    Comprehensive Metabolic Panel (Completed)    Ferritin (Completed)    Iron and TIBC (Completed)    Protein Electrophoresis, Serum (Completed)    Immunofixation, Serum (Completed)    Immunoglobulin Free LT Chains Blood (Completed)    Beta 2 Microglobulin, Serum (Completed)    Immunofixation, 24 hour Urine    Immunoglobulins (IgG, IgA, IgM) Quantitative (Completed)    Protein electrophoresis, timed urine (Completed)      Encounter for screening mammogram for breast cancer        Relevant Orders    Mammo Digital Screening Bilat w/ Greg (XPD) (Completed)                Med Onc Chart Routing      Follow up with physician 3 months. with repeat labs one week prior   Follow up with NISHANT    Infusion scheduling note    Injection scheduling note    Labs CBC, CMP, ferritin, iron and TIBC, UPEP, free light chains, SPEP and other   Scheduling:  Preferred lab:  Lab interval:     Imaging    Pharmacy appointment    Other referrals                   CAMILLE ArguetaP-C  Hematology/Oncology       [1]   Social History  Socioeconomic History    Marital status:     Number of children: 4   Occupational History    Occupation: [employer]     Employer: [employer]   Tobacco Use    Smoking status: Never     Passive exposure: Never    Smokeless tobacco: Never   Substance and Sexual Activity    Alcohol use: No    Drug use: No    Sexual activity: Yes     Partners: Male     Birth control/protection: Surgical     Comment: Tubal

## 2025-06-18 ENCOUNTER — OFFICE VISIT (OUTPATIENT)
Dept: OBSTETRICS AND GYNECOLOGY | Facility: CLINIC | Age: 55
End: 2025-06-18
Payer: COMMERCIAL

## 2025-06-18 ENCOUNTER — LAB VISIT (OUTPATIENT)
Dept: LAB | Facility: HOSPITAL | Age: 55
End: 2025-06-18
Attending: NURSE PRACTITIONER
Payer: COMMERCIAL

## 2025-06-18 VITALS
DIASTOLIC BLOOD PRESSURE: 92 MMHG | WEIGHT: 181.69 LBS | HEIGHT: 64 IN | SYSTOLIC BLOOD PRESSURE: 144 MMHG | BODY MASS INDEX: 31.02 KG/M2

## 2025-06-18 DIAGNOSIS — N93.8 DUB (DYSFUNCTIONAL UTERINE BLEEDING): ICD-10-CM

## 2025-06-18 DIAGNOSIS — Z01.419 ENCOUNTER FOR GYNECOLOGICAL EXAMINATION WITHOUT ABNORMAL FINDING: Primary | ICD-10-CM

## 2025-06-18 DIAGNOSIS — D21.9 FIBROIDS: ICD-10-CM

## 2025-06-18 DIAGNOSIS — Z11.3 SCREENING FOR STD (SEXUALLY TRANSMITTED DISEASE): ICD-10-CM

## 2025-06-18 LAB
HAV IGM SERPL QL IA: NORMAL
HBV CORE IGM SERPL QL IA: NORMAL
HBV SURFACE AG SERPL QL IA: NORMAL
HCV AB SERPL QL IA: NORMAL
HIV 1+2 AB+HIV1 P24 AG SERPL QL IA: NORMAL
T PALLIDUM IGG+IGM SER QL: NORMAL

## 2025-06-18 PROCEDURE — 3077F SYST BP >= 140 MM HG: CPT | Mod: CPTII,S$GLB,, | Performed by: NURSE PRACTITIONER

## 2025-06-18 PROCEDURE — 80074 ACUTE HEPATITIS PANEL: CPT

## 2025-06-18 PROCEDURE — 3044F HG A1C LEVEL LT 7.0%: CPT | Mod: CPTII,S$GLB,, | Performed by: NURSE PRACTITIONER

## 2025-06-18 PROCEDURE — 1160F RVW MEDS BY RX/DR IN RCRD: CPT | Mod: CPTII,S$GLB,, | Performed by: NURSE PRACTITIONER

## 2025-06-18 PROCEDURE — 87389 HIV-1 AG W/HIV-1&-2 AB AG IA: CPT

## 2025-06-18 PROCEDURE — 86593 SYPHILIS TEST NON-TREP QUANT: CPT

## 2025-06-18 PROCEDURE — 99396 PREV VISIT EST AGE 40-64: CPT | Mod: S$GLB,,, | Performed by: NURSE PRACTITIONER

## 2025-06-18 PROCEDURE — 3080F DIAST BP >= 90 MM HG: CPT | Mod: CPTII,S$GLB,, | Performed by: NURSE PRACTITIONER

## 2025-06-18 PROCEDURE — 3008F BODY MASS INDEX DOCD: CPT | Mod: CPTII,S$GLB,, | Performed by: NURSE PRACTITIONER

## 2025-06-18 PROCEDURE — 99999 PR PBB SHADOW E&M-EST. PATIENT-LVL III: CPT | Mod: PBBFAC,,, | Performed by: NURSE PRACTITIONER

## 2025-06-18 PROCEDURE — 36415 COLL VENOUS BLD VENIPUNCTURE: CPT

## 2025-06-18 PROCEDURE — 1159F MED LIST DOCD IN RCRD: CPT | Mod: CPTII,S$GLB,, | Performed by: NURSE PRACTITIONER

## 2025-06-18 NOTE — PROGRESS NOTES
CC: Well woman exam    Dana Winter is a 55 y.o. female  presents for well woman exam.  LMP: Patient's last menstrual period was 2025 (approximate)..    Skipped cycle in May but having one for   Still having some bleeding today  Hx of fibroids   Wants std blood work    Health Maintenance Topics with due status: Not Due       Topic Last Completion Date    Colorectal Cancer Screening 2021    Cervical Cancer Screening 2023    Lipid Panel 2024    Hemoglobin A1c (Diabetic Prevention Screening) 2025    Mammogram 2025    Influenza Vaccine Not Due    RSV Vaccine (Age 60+ and Pregnant patients) Not Due     Past Medical History:   Diagnosis Date    DJD (degenerative joint disease) of cervical spine     on PT    Hypertension      Past Surgical History:   Procedure Laterality Date    APPENDECTOMY      BURSECTOMY,OR FLEXOR TENDON SHEATH SYNOVECTOMY,WRIST,RADICAL Right 2025    Procedure: BURSECTOMY,OR FLEXOR TENDON SHEATH SYNOVECTOMY,WRIST,RADICAL;  Surgeon: Genevieve Garcia MD;  Location: [facility] OR;  Service: Orthopedics;  Laterality: Right;    COLONOSCOPY N/A 2021    Procedure: COLONOSCOPY;  Surgeon: Michelle Harry MD;  Location: [facility] ENDO;  Service: Endoscopy;  Laterality: N/A;    LAPAROSCOPIC APPENDECTOMY      SYNOVECTOMY OF FINGER Right 2025    Procedure: SYNOVECTOMY, FINGER;  Surgeon: Genevieve Garcia MD;  Location: [facility] OR;  Service: Orthopedics;  Laterality: Right;  [id number] Right wrist FCR flexor synovectomy  75210 Right ring finger MCP synovectomy  [id number] Right index finger PIP synovectomy    SYNOVECTOMY OF HAND Right 2025    Procedure: SYNOVECTOMY, HAND;  Surgeon: Genevieve Garcia MD;  Location: [facility] OR;  Service: Orthopedics;  Laterality: Right;    TUBAL LIGATION       Social History[1]  Family History   Problem Relation Name Age of Onset    Hypertension Father      Stroke Father      Heart attack Father       OB History         "  4    Para   4    Term   4            AB        Living   4         SAB        IAB        Ectopic        Multiple        Live Births                     BP (!) 144/92 (BP Location: Right arm, Patient Position: Sitting)   Ht 5' 4" (1.626 m)   Wt 82.4 kg (181 lb 10.5 oz)   LMP 2025 (Approximate)   BMI 31.18 kg/m²       ROS:  Per hpi    PHYSICAL EXAM:  APPEARANCE: Well nourished, well developed, in no acute distress.  AFFECT: WNL, alert and oriented x 3  SKIN: No acne or hirsutism  NECK: Neck symmetric without masses or thyromegaly  NODES: No inguinal, cervical, axillary, or femoral lymph node enlargement  CHEST: Good respiratory effect  ABDOMEN: Soft.  No tenderness or masses.  No hepatosplenomegaly.  No hernias.  BREASTS: Symmetrical, no skin changes or visible lesions.  No palpable masses, nipple discharge bilaterally.  PELVIC: Normal external genitalia without lesions.  Normal hair distribution.  Adequate perineal body, normal urethral meatus.  Vagina moist and well rugated without lesions or discharge- deena bloody discharge  Cervix pink, without lesions, discharge or tenderness.  No significant cystocele or rectocele.  Bimanual exam shows uterus to be 16-18 size with fibroids, nontender.  Adnexa without masses or tenderness.    EXTREMITIES: No edema.  Physical Exam    1. Encounter for gynecological examination without abnormal finding  Liquid-Based Pap Smear, Screening      2. Screening for STD (sexually transmitted disease)  Treponema Pallidium Antibodies IgG, IgM    Hepatitis Panel, Acute    HIV 1/2 Ag/Ab (4th Gen)      3. Fibroids  US Pelvis Comp with Transvag NON-OB (xpd      4. DUB (dysfunctional uterine bleeding)  US Pelvis Comp with Transvag NON-OB (xpd       AND PLAN:    Dana was seen today for well woman.    Diagnoses and all orders for this visit:    Encounter for gynecological examination without abnormal finding  -     Liquid-Based Pap Smear, Screening    Screening for STD (sexually " transmitted disease)  -     Treponema Pallidium Antibodies IgG, IgM; Future  -     Hepatitis Panel, Acute; Future  -     HIV 1/2 Ag/Ab (4th Gen); Future    Fibroids  -     US Pelvis Comp with Transvag NON-OB (xpd; Future    DUB (dysfunctional uterine bleeding)  -     US Pelvis Comp with Transvag NON-OB (xpd; Future     Check u/s due to fibroids and bleeding    Patient was counseled today on A.C.S. Pap guidelines and recommendations for yearly pelvic exams, mammograms and monthly self breast exams; to see her PCP for other health maintenance.                          [1]   Social History  Socioeconomic History    Marital status:     Number of children: 4   Occupational History    Occupation: [email or web address]     Employer: [employer]   Tobacco Use    Smoking status: Never     Passive exposure: Never    Smokeless tobacco: Never   Substance and Sexual Activity    Alcohol use: No    Drug use: No    Sexual activity: Yes     Partners: Male     Birth control/protection: Surgical     Comment: Tubal

## 2025-06-19 ENCOUNTER — RESULTS FOLLOW-UP (OUTPATIENT)
Dept: OBSTETRICS AND GYNECOLOGY | Facility: CLINIC | Age: 55
End: 2025-06-19

## 2025-06-26 ENCOUNTER — HOSPITAL ENCOUNTER (OUTPATIENT)
Dept: RADIOLOGY | Facility: HOSPITAL | Age: 55
Discharge: HOME | End: 2025-06-26
Attending: NURSE PRACTITIONER
Payer: COMMERCIAL

## 2025-06-26 DIAGNOSIS — N93.8 DUB (DYSFUNCTIONAL UTERINE BLEEDING): ICD-10-CM

## 2025-06-26 DIAGNOSIS — D21.9 FIBROIDS: ICD-10-CM

## 2025-06-26 PROCEDURE — 76856 US EXAM PELVIC COMPLETE: CPT | Mod: 26,,, | Performed by: RADIOLOGY

## 2025-06-26 PROCEDURE — 76856 US EXAM PELVIC COMPLETE: CPT | Mod: TC

## 2025-06-30 ENCOUNTER — RESULTS FOLLOW-UP (OUTPATIENT)
Dept: OBSTETRICS AND GYNECOLOGY | Facility: CLINIC | Age: 55
End: 2025-06-30

## 2025-07-25 ENCOUNTER — PATIENT OUTREACH (OUTPATIENT)
Dept: ADMINISTRATIVE | Facility: HOSPITAL | Age: 55
End: 2025-07-25
Payer: COMMERCIAL

## 2025-07-25 NOTE — PROGRESS NOTES
Working HTN report:    Called and spoke with patient - NV scheduled 7/31/2025 @ 10:15 for bp check. No bp cuff at home.

## 2025-07-31 ENCOUNTER — CLINICAL SUPPORT (OUTPATIENT)
Dept: INTERNAL MEDICINE | Facility: CLINIC | Age: 55
End: 2025-07-31
Payer: COMMERCIAL

## 2025-07-31 ENCOUNTER — PROCEDURE VISIT (OUTPATIENT)
Dept: OBSTETRICS AND GYNECOLOGY | Facility: CLINIC | Age: 55
End: 2025-07-31
Payer: COMMERCIAL

## 2025-07-31 VITALS — BODY MASS INDEX: 31.18 KG/M2 | HEIGHT: 64 IN | WEIGHT: 182.63 LBS

## 2025-07-31 VITALS — DIASTOLIC BLOOD PRESSURE: 86 MMHG | SYSTOLIC BLOOD PRESSURE: 136 MMHG

## 2025-07-31 DIAGNOSIS — Z01.818 PREPROCEDURAL EXAMINATION: ICD-10-CM

## 2025-07-31 DIAGNOSIS — D21.9 FIBROIDS: ICD-10-CM

## 2025-07-31 DIAGNOSIS — R93.89 THICKENED ENDOMETRIUM: ICD-10-CM

## 2025-07-31 DIAGNOSIS — N93.8 DUB (DYSFUNCTIONAL UTERINE BLEEDING): Primary | ICD-10-CM

## 2025-07-31 DIAGNOSIS — Z71.9 COUNSELED BY NURSE: Primary | ICD-10-CM

## 2025-07-31 LAB
B-HCG UR QL: NEGATIVE
CTP QC/QA: YES

## 2025-07-31 PROCEDURE — 99999 PR PBB SHADOW E&M-EST. PATIENT-LVL II: CPT | Mod: PBBFAC,,,

## 2025-07-31 NOTE — PROCEDURES
Endometrial Biopsy- Today    Date/Time: 7/31/2025 10:30 AM    Performed by: Tracy Bailey NP  Authorized by: Tracy Bailey NP    Consent:     Prior to procedure the appropriate consent was completed and verified      Consent given by:  Patient    Patient questions answered: yes      Patient agrees, verbalizes understanding, and wants to proceed: yes      Educational handouts given: yes      Instructions and paperwork completed: yes    Indication:     Indications: Menorrhagia and Other disorder of menstruation and other abnormal bleeding from female genital tract    Pre-procedure:     Pre-procedure timeout performed: yes    Procedure:     Procedure: endometrial biopsy with Pipelle      Cervix cleaned and prepped: yes      The cervix was dilated using cervical dilators: yes      Use of single-tooth tenaculum: yes      Uterus sounded: yes      Uterus sound depth (cm):  12    Curettes used:  1    Specimen collected: specimen collected and sent to pathology    Comments:     Procedure comments:  F/u with gyn MD post biopsy results to discuss bleeding and fibroids

## 2025-07-31 NOTE — PROGRESS NOTES
Pt present on today for blood pressure check. Pt reading on today was stable and pt informed to continue current medication regime. Pt voiced understanding./Carrollw

## 2025-08-13 ENCOUNTER — TELEPHONE (OUTPATIENT)
Dept: OBSTETRICS AND GYNECOLOGY | Facility: CLINIC | Age: 55
End: 2025-08-13
Payer: COMMERCIAL

## 2025-08-13 ENCOUNTER — PATIENT MESSAGE (OUTPATIENT)
Dept: OBSTETRICS AND GYNECOLOGY | Facility: CLINIC | Age: 55
End: 2025-08-13

## 2025-08-13 ENCOUNTER — OFFICE VISIT (OUTPATIENT)
Dept: OBSTETRICS AND GYNECOLOGY | Facility: CLINIC | Age: 55
End: 2025-08-13
Payer: COMMERCIAL

## 2025-08-13 VITALS
HEIGHT: 64 IN | BODY MASS INDEX: 30.34 KG/M2 | DIASTOLIC BLOOD PRESSURE: 76 MMHG | SYSTOLIC BLOOD PRESSURE: 128 MMHG | WEIGHT: 177.69 LBS

## 2025-08-13 DIAGNOSIS — N84.0 ABNORMAL UTERINE BLEEDING DUE TO ENDOMETRIAL POLYP: Primary | ICD-10-CM

## 2025-08-13 DIAGNOSIS — N93.9 ABNORMAL UTERINE BLEEDING DUE TO ENDOMETRIAL POLYP: Primary | ICD-10-CM

## 2025-08-13 PROCEDURE — 1159F MED LIST DOCD IN RCRD: CPT | Mod: CPTII,S$GLB,, | Performed by: OBSTETRICS & GYNECOLOGY

## 2025-08-13 PROCEDURE — 99213 OFFICE O/P EST LOW 20 MIN: CPT | Mod: S$GLB,,, | Performed by: OBSTETRICS & GYNECOLOGY

## 2025-08-13 PROCEDURE — 3074F SYST BP LT 130 MM HG: CPT | Mod: CPTII,S$GLB,, | Performed by: OBSTETRICS & GYNECOLOGY

## 2025-08-13 PROCEDURE — 3008F BODY MASS INDEX DOCD: CPT | Mod: CPTII,S$GLB,, | Performed by: OBSTETRICS & GYNECOLOGY

## 2025-08-13 PROCEDURE — 3078F DIAST BP <80 MM HG: CPT | Mod: CPTII,S$GLB,, | Performed by: OBSTETRICS & GYNECOLOGY

## 2025-08-13 PROCEDURE — 3044F HG A1C LEVEL LT 7.0%: CPT | Mod: CPTII,S$GLB,, | Performed by: OBSTETRICS & GYNECOLOGY

## 2025-08-13 PROCEDURE — 99999 PR PBB SHADOW E&M-EST. PATIENT-LVL III: CPT | Mod: PBBFAC,,, | Performed by: OBSTETRICS & GYNECOLOGY

## 2025-08-25 ENCOUNTER — LAB VISIT (OUTPATIENT)
Dept: LAB | Facility: HOSPITAL | Age: 55
End: 2025-08-25
Payer: COMMERCIAL

## 2025-08-25 ENCOUNTER — TELEPHONE (OUTPATIENT)
Dept: OBSTETRICS AND GYNECOLOGY | Facility: CLINIC | Age: 55
End: 2025-08-25
Payer: COMMERCIAL

## 2025-08-25 DIAGNOSIS — R76.8 ELEVATED SERUM IMMUNOGLOBULIN FREE LIGHT CHAINS: ICD-10-CM

## 2025-08-25 DIAGNOSIS — Z86.2 HISTORY OF THROMBOCYTOSIS: ICD-10-CM

## 2025-08-25 DIAGNOSIS — R76.8 ELEVATED SERUM IMMUNOGLOBULIN FREE LIGHT CHAIN LEVEL: ICD-10-CM

## 2025-08-25 DIAGNOSIS — D50.0 IRON DEFICIENCY ANEMIA DUE TO CHRONIC BLOOD LOSS: ICD-10-CM

## 2025-08-25 DIAGNOSIS — M06.09 RHEUMATOID ARTHRITIS OF MULTIPLE SITES WITH NEGATIVE RHEUMATOID FACTOR: ICD-10-CM

## 2025-08-25 LAB
ABSOLUTE EOSINOPHIL (OHS): 0.1 K/UL
ABSOLUTE MONOCYTE (OHS): 0.44 K/UL (ref 0.3–1)
ABSOLUTE NEUTROPHIL COUNT (OHS): 3.59 K/UL (ref 1.8–7.7)
ALBUMIN SERPL BCP-MCNC: 3.2 G/DL (ref 3.5–5.2)
ALP SERPL-CCNC: 96 UNIT/L (ref 40–150)
ALT SERPL W/O P-5'-P-CCNC: <8 UNIT/L (ref 10–44)
ANION GAP (OHS): 8 MMOL/L (ref 8–16)
AST SERPL-CCNC: 15 UNIT/L (ref 11–45)
BASOPHILS # BLD AUTO: 0.05 K/UL
BASOPHILS NFR BLD AUTO: 0.8 %
BILIRUB SERPL-MCNC: 0.1 MG/DL (ref 0.1–1)
BUN SERPL-MCNC: 12 MG/DL (ref 6–20)
CALCIUM SERPL-MCNC: 8.7 MG/DL (ref 8.7–10.5)
CHLORIDE SERPL-SCNC: 110 MMOL/L (ref 95–110)
CO2 SERPL-SCNC: 24 MMOL/L (ref 23–29)
CREAT SERPL-MCNC: 1.1 MG/DL (ref 0.5–1.4)
ERYTHROCYTE [DISTWIDTH] IN BLOOD BY AUTOMATED COUNT: 14.6 % (ref 11.5–14.5)
FERRITIN SERPL-MCNC: 29 NG/ML (ref 20–300)
GFR SERPLBLD CREATININE-BSD FMLA CKD-EPI: 60 ML/MIN/1.73/M2
GLUCOSE SERPL-MCNC: 94 MG/DL (ref 70–110)
HCT VFR BLD AUTO: 34.8 % (ref 37–48.5)
HGB BLD-MCNC: 11.3 GM/DL (ref 12–16)
IMM GRANULOCYTES # BLD AUTO: 0.02 K/UL (ref 0–0.04)
IMM GRANULOCYTES NFR BLD AUTO: 0.3 % (ref 0–0.5)
IRON SATN MFR SERPL: 12 % (ref 20–50)
IRON SERPL-MCNC: 46 UG/DL (ref 30–160)
LYMPHOCYTES # BLD AUTO: 1.93 K/UL (ref 1–4.8)
MCH RBC QN AUTO: 27 PG (ref 27–31)
MCHC RBC AUTO-ENTMCNC: 32.5 G/DL (ref 32–36)
MCV RBC AUTO: 83 FL (ref 82–98)
NUCLEATED RBC (/100WBC) (OHS): 0 /100 WBC
PLATELET # BLD AUTO: 491 K/UL (ref 150–450)
PMV BLD AUTO: 8.7 FL (ref 9.2–12.9)
POTASSIUM SERPL-SCNC: 4.1 MMOL/L (ref 3.5–5.1)
PROT SERPL-MCNC: 7.2 GM/DL (ref 6–8.4)
RBC # BLD AUTO: 4.18 M/UL (ref 4–5.4)
RELATIVE EOSINOPHIL (OHS): 1.6 %
RELATIVE LYMPHOCYTE (OHS): 31.5 % (ref 18–48)
RELATIVE MONOCYTE (OHS): 7.2 % (ref 4–15)
RELATIVE NEUTROPHIL (OHS): 58.6 % (ref 38–73)
SODIUM SERPL-SCNC: 142 MMOL/L (ref 136–145)
TIBC SERPL-MCNC: 371 UG/DL (ref 250–450)
TRANSFERRIN SERPL-MCNC: 251 MG/DL (ref 200–375)
WBC # BLD AUTO: 6.13 K/UL (ref 3.9–12.7)

## 2025-08-25 PROCEDURE — 84165 PROTEIN E-PHORESIS SERUM: CPT

## 2025-08-25 PROCEDURE — 36415 COLL VENOUS BLD VENIPUNCTURE: CPT

## 2025-08-25 PROCEDURE — 84466 ASSAY OF TRANSFERRIN: CPT

## 2025-08-25 PROCEDURE — 83521 IG LIGHT CHAINS FREE EACH: CPT

## 2025-08-25 PROCEDURE — 82728 ASSAY OF FERRITIN: CPT

## 2025-08-25 PROCEDURE — 85025 COMPLETE CBC W/AUTO DIFF WBC: CPT

## 2025-08-25 PROCEDURE — 82947 ASSAY GLUCOSE BLOOD QUANT: CPT

## 2025-08-26 LAB
ALBUMIN, SPE (OHS): 3.36 G/DL (ref 3.35–5.55)
ALPHA 1 GLOB (OHS): 0.34 GM/DL (ref 0.17–0.41)
ALPHA 2 GLOB (OHS): 0.83 GM/DL (ref 0.43–0.99)
BETA GLOB (OHS): 0.95 GM/DL (ref 0.5–1.1)
GAMMA GLOBULIN (OHS): 1.41 GM/DL (ref 0.67–1.58)
KAPPA LC FREE SER-MCNC: 1.5 MG/L (ref 0.26–1.65)
KAPPA LC FREE/LAMBDA FREE SER: 4.27 MG/DL (ref 0.33–1.94)
LAMBDA LC FREE SERPL-MCNC: 2.84 MG/DL (ref 0.57–2.63)
PATHOLOGIST REVIEW - SPE (OHS): NORMAL
PROT SERPL-MCNC: 6.9 GM/DL (ref 6–8.4)

## 2025-08-29 ENCOUNTER — TELEPHONE (OUTPATIENT)
Dept: OBSTETRICS AND GYNECOLOGY | Facility: CLINIC | Age: 55
End: 2025-08-29
Payer: COMMERCIAL

## 2025-08-29 DIAGNOSIS — Z01.818 PRE-OP TESTING: Primary | ICD-10-CM

## 2025-08-29 DIAGNOSIS — N84.0 ABNORMAL UTERINE BLEEDING DUE TO ENDOMETRIAL POLYP: ICD-10-CM

## 2025-08-29 DIAGNOSIS — R93.89 THICKENED ENDOMETRIUM: ICD-10-CM

## 2025-08-29 DIAGNOSIS — N93.9 ABNORMAL UTERINE BLEEDING DUE TO ENDOMETRIAL POLYP: ICD-10-CM

## 2025-08-29 DIAGNOSIS — N93.8 DUB (DYSFUNCTIONAL UTERINE BLEEDING): ICD-10-CM

## 2025-09-03 ENCOUNTER — OFFICE VISIT (OUTPATIENT)
Dept: HEMATOLOGY/ONCOLOGY | Facility: CLINIC | Age: 55
End: 2025-09-03
Payer: COMMERCIAL

## 2025-09-03 VITALS
HEART RATE: 91 BPM | HEIGHT: 64 IN | DIASTOLIC BLOOD PRESSURE: 88 MMHG | TEMPERATURE: 98 F | BODY MASS INDEX: 30.48 KG/M2 | WEIGHT: 178.56 LBS | SYSTOLIC BLOOD PRESSURE: 124 MMHG

## 2025-09-03 DIAGNOSIS — D64.9 ANEMIA, UNSPECIFIED TYPE: ICD-10-CM

## 2025-09-03 DIAGNOSIS — D50.0 IRON DEFICIENCY ANEMIA DUE TO CHRONIC BLOOD LOSS: ICD-10-CM

## 2025-09-03 DIAGNOSIS — R76.8 ELEVATED SERUM IMMUNOGLOBULIN FREE LIGHT CHAIN LEVEL: ICD-10-CM

## 2025-09-03 DIAGNOSIS — M06.09 RHEUMATOID ARTHRITIS OF MULTIPLE SITES WITH NEGATIVE RHEUMATOID FACTOR: Primary | ICD-10-CM

## 2025-09-03 DIAGNOSIS — Z86.2 HISTORY OF THROMBOCYTOSIS: ICD-10-CM

## 2025-09-03 DIAGNOSIS — J06.9 UPPER RESPIRATORY TRACT INFECTION, UNSPECIFIED TYPE: ICD-10-CM

## 2025-09-03 PROCEDURE — 99214 OFFICE O/P EST MOD 30 MIN: CPT | Mod: S$GLB,,,

## 2025-09-03 PROCEDURE — 3079F DIAST BP 80-89 MM HG: CPT | Mod: CPTII,S$GLB,,

## 2025-09-03 PROCEDURE — 3074F SYST BP LT 130 MM HG: CPT | Mod: CPTII,S$GLB,,

## 2025-09-03 PROCEDURE — 1160F RVW MEDS BY RX/DR IN RCRD: CPT | Mod: CPTII,S$GLB,,

## 2025-09-03 PROCEDURE — 3044F HG A1C LEVEL LT 7.0%: CPT | Mod: CPTII,S$GLB,,

## 2025-09-03 PROCEDURE — 1159F MED LIST DOCD IN RCRD: CPT | Mod: CPTII,S$GLB,,

## 2025-09-03 PROCEDURE — G2211 COMPLEX E/M VISIT ADD ON: HCPCS | Mod: S$GLB,,,

## 2025-09-03 PROCEDURE — 3008F BODY MASS INDEX DOCD: CPT | Mod: CPTII,S$GLB,,

## 2025-09-03 PROCEDURE — 99999 PR PBB SHADOW E&M-EST. PATIENT-LVL IV: CPT | Mod: PBBFAC,,,

## 2025-09-03 RX ORDER — HEPARIN 100 UNIT/ML
500 SYRINGE INTRAVENOUS
OUTPATIENT
Start: 2025-09-03

## 2025-09-03 RX ORDER — AMOXICILLIN 500 MG/1
500 TABLET, FILM COATED ORAL EVERY 12 HOURS
Qty: 10 TABLET | Refills: 0 | Status: SHIPPED | OUTPATIENT
Start: 2025-09-03 | End: 2025-09-08

## 2025-09-03 RX ORDER — EPINEPHRINE 0.3 MG/.3ML
0.3 INJECTION SUBCUTANEOUS ONCE AS NEEDED
OUTPATIENT
Start: 2025-09-03 | End: 2037-01-30

## 2025-09-03 RX ORDER — SODIUM CHLORIDE 0.9 % (FLUSH) 0.9 %
10 SYRINGE (ML) INJECTION
OUTPATIENT
Start: 2025-09-03

## (undated) DEVICE — BLADE SCALP OPHTL BEVEL STR

## (undated) DEVICE — STRIP MEDI WND CLSR 1/2X4IN

## (undated) DEVICE — CLOSURE SKIN STERI STRIP 1/2X4

## (undated) DEVICE — COVER LIGHT HANDLE 80/CA

## (undated) DEVICE — DRAPE MINI C-ARM

## (undated) DEVICE — COVER CAMERA OPERATING ROOM

## (undated) DEVICE — BLADE SCALP OPHTL RND TIP

## (undated) DEVICE — APPLICATOR CHLORAPREP ORN 26ML

## (undated) DEVICE — DRAPE HAND STERILE

## (undated) DEVICE — SUT MONOCRYL 4.0 PS2 CP496G

## (undated) DEVICE — SUPPORT ULNA NERVE PROTECTOR

## (undated) DEVICE — CORD BIPOLAR ELECTROSURGICAL

## (undated) DEVICE — SUT CTD VICRYL 4-0 BR PS-2

## (undated) DEVICE — SOL 9P NACL IRR PIC IL

## (undated) DEVICE — UNDERGLOVES BIOGEL PI SZ 7 LF

## (undated) DEVICE — BANDAGE ELASTIC 3X5 VELCRO ST

## (undated) DEVICE — UNDERGLOVE BIOGEL PI SZ 6.5 LF

## (undated) DEVICE — DRAPE THREE-QTR REINF 53X77IN

## (undated) DEVICE — DRAPE U SPLIT SHEET 54X76IN

## (undated) DEVICE — PACK BASIC SETUP SC BR

## (undated) DEVICE — SYR 10CC LUER LOCK

## (undated) DEVICE — SUT VICRYL PLUS 3-0 PS2 27

## (undated) DEVICE — DRESSING N ADH OIL EMUL 3X3

## (undated) DEVICE — POSITIONER HEAD DONUT 9IN FOAM

## (undated) DEVICE — KIT TURNOVER

## (undated) DEVICE — ALCOHOL 70% ANTISEPTIC ISO 4OZ

## (undated) DEVICE — TOURNIQUET SB QC DP 18X4IN

## (undated) DEVICE — SPONGE GAUZE 4X4 12 PLY STRL

## (undated) DEVICE — GOWN FAB REINF SET-IN SLV LG

## (undated) DEVICE — ADHESIVE DERMABOND ADVANCED

## (undated) DEVICE — NDL SAFETY 22G X 1.5 ECLIPSE

## (undated) DEVICE — DRAPE STERI-DRAPE 1000 17X11IN

## (undated) DEVICE — GAUZE CNFRM STRL 2INX4.1YD